# Patient Record
Sex: FEMALE | Race: BLACK OR AFRICAN AMERICAN | NOT HISPANIC OR LATINO | Employment: UNEMPLOYED | RURAL
[De-identification: names, ages, dates, MRNs, and addresses within clinical notes are randomized per-mention and may not be internally consistent; named-entity substitution may affect disease eponyms.]

---

## 2014-05-20 LAB — CRC RECOMMENDATION EXT: NORMAL

## 2020-09-30 LAB — CRC RECOMMENDATION EXT: NORMAL

## 2020-10-21 ENCOUNTER — HISTORICAL (OUTPATIENT)
Dept: ADMINISTRATIVE | Facility: HOSPITAL | Age: 65
End: 2020-10-21

## 2020-10-21 LAB
25(OH)D3 SERPL-MCNC: 41.2 NG/ML
ALBUMIN SERPL BCP-MCNC: 3.9 G/DL (ref 3.5–5.5)
ALBUMIN/GLOB SERPL: 1 {RATIO}
ALP SERPL-CCNC: 66 U/L (ref 42–141)
ALT SERPL W P-5'-P-CCNC: 22 U/L (ref 10–47)
ANION GAP SERPL CALCULATED.3IONS-SCNC: 10.7 MMOL/L (ref 7–16)
AST SERPL W P-5'-P-CCNC: 20 U/L (ref 11–38)
BACTERIA #/AREA URNS HPF: ABNORMAL /HPF
BILIRUB SERPL-MCNC: 1.2 MG/DL (ref 0.2–1.6)
BILIRUB UR QL STRIP: NEGATIVE MG/DL
BUN SERPL-MCNC: 13 MG/DL (ref 7–22)
BUN/CREAT SERPL: 18.6 G/DL
CALCIUM SERPL-MCNC: 10.2 MG/DL (ref 8–10.3)
CHLORIDE SERPL-SCNC: 106 MMOL/L (ref 98–108)
CHOLEST SERPL-MCNC: 150 MG/DL
CHOLEST/HDLC SERPL: 2.3 {RATIO}
CLARITY UR: CLEAR
CO2 SERPL-SCNC: 28 MMOL/L (ref 18–33)
COLOR UR: YELLOW
CREAT SERPL-MCNC: 0.7 MG/DL (ref 0.6–1.2)
ERYTHROCYTE [DISTWIDTH] IN BLOOD BY AUTOMATED COUNT: 13.6 % (ref 11.5–14.5)
EST. AVERAGE GLUCOSE BLD GHB EST-MCNC: 140 MG/DL
GLOBULIN SER-MCNC: 3 G/DL
GLUCOSE SERPL-MCNC: 138 MG/DL (ref 73–118)
GLUCOSE UR STRIP-MCNC: NORMAL MG/DL
HBA1C MFR BLD HPLC: 6.8 % (ref 4.5–6.6)
HCT VFR BLD AUTO: 38.1 % (ref 38–47)
HDLC SERPL-MCNC: 66 MG/DL
HGB BLD-MCNC: 12.6 G/DL (ref 12–16)
KETONES UR STRIP-SCNC: NEGATIVE MG/DL
LDLC SERPL CALC-MCNC: 71 MG/DL
LEUKOCYTE ESTERASE UR QL STRIP: NEGATIVE LEU/UL
LYMPHOCYTES # BLD AUTO: 1.3 X10E3/UL (ref 0.9–3)
LYMPHOCYTES NFR BLD AUTO: 41.6 % (ref 16.8–42.5)
MCH RBC QN AUTO: 29.5 PG (ref 27–31)
MCHC RBC AUTO-ENTMCNC: 33.1 G/DL (ref 32–36)
MCV RBC AUTO: 89.2 FL (ref 80–96)
MIXED, AUTO ABSOLUTE: 0.3 X10E3/UL (ref 0.3–1.1)
MIXED, AUTO RELATIVE: 9.2 % (ref 4.7–13.3)
MPC BLD CALC-MCNC: 9.3 FL (ref 9.4–12.4)
NEUTROPHILS # BLD AUTO: 1.5 X10E3/UL (ref 2.4–7.5)
NEUTROPHILS NFR BLD AUTO: 49.2 % (ref 46.4–74.7)
NITRITE UR QL STRIP: NEGATIVE
PH UR STRIP: 6.5 PH UNITS (ref 5–8)
PLATELET # BLD AUTO: 215 X10E3/UL (ref 150–400)
POTASSIUM SERPL-SCNC: 3.7 MMOL/L (ref 3.6–5.1)
PROT SERPL-MCNC: 7.1 G/DL (ref 6.4–8.1)
PROT UR QL STRIP: ABNORMAL MG/DL
RBC # BLD AUTO: 4.27 X10E6/UL (ref 4.2–5.4)
RBC # UR STRIP: ABNORMAL ERY/UL
RBC #/AREA URNS HPF: ABNORMAL /HPF (ref 0–3)
SODIUM SERPL-SCNC: 141 MMOL/L (ref 128–145)
SP GR UR STRIP: 1.02 (ref 1–1.03)
SQUAMOUS #/AREA URNS LPF: ABNORMAL /LPF
T4 SERPL-MCNC: 6.6 UG/DL (ref 4.8–13.9)
TRIGL SERPL-MCNC: 63 MG/DL
TSH SERPL DL<=0.005 MIU/L-ACNC: 0.77 UIU/ML (ref 0.36–3.74)
UROBILINOGEN UR STRIP-ACNC: 1 MG/DL
WBC # BLD AUTO: 3.1 X10E3/UL (ref 4.5–11)
WBC #/AREA URNS HPF: ABNORMAL /HPF (ref 0–5)

## 2020-11-16 ENCOUNTER — HISTORICAL (OUTPATIENT)
Dept: ADMINISTRATIVE | Facility: HOSPITAL | Age: 65
End: 2020-11-16

## 2020-11-16 LAB
BASOPHILS # BLD AUTO: 0.03 X10E3/UL (ref 0–0.2)
BASOPHILS NFR BLD AUTO: 0.7 % (ref 0–1)
EOSINOPHIL # BLD AUTO: 0.21 X10E3/UL (ref 0–0.5)
EOSINOPHIL NFR BLD AUTO: 4.7 % (ref 1–4)
ERYTHROCYTE [DISTWIDTH] IN BLOOD BY AUTOMATED COUNT: 14 % (ref 11.5–14.5)
HCT VFR BLD AUTO: 36.5 % (ref 38–47)
HGB BLD-MCNC: 12.2 G/DL (ref 12–16)
IMM GRANULOCYTES # BLD AUTO: 0.01 X10E3/UL (ref 0–0.04)
IMM GRANULOCYTES NFR BLD: 0.2 % (ref 0–0.4)
LYMPHOCYTES # BLD AUTO: 1.26 X10E3/UL (ref 1–4.8)
LYMPHOCYTES NFR BLD AUTO: 28.1 % (ref 27–41)
MCH RBC QN AUTO: 29.8 PG (ref 27–31)
MCHC RBC AUTO-ENTMCNC: 33.4 G/DL (ref 32–36)
MCV RBC AUTO: 89.2 FL (ref 80–96)
MONOCYTES # BLD AUTO: 0.49 X10E3/UL (ref 0–0.8)
MONOCYTES NFR BLD AUTO: 10.9 % (ref 2–6)
MPC BLD CALC-MCNC: 9.8 FL (ref 9.4–12.4)
NEUTROPHILS # BLD AUTO: 2.49 X10E3/UL (ref 1.8–7.7)
NEUTROPHILS NFR BLD AUTO: 55.4 % (ref 53–65)
NRBC # BLD AUTO: 0 X10E3/UL (ref 0–0)
NRBC, AUTO (.00): 0 /100 (ref 0–0)
PLATELET # BLD AUTO: 219 X10E3/UL (ref 150–400)
RBC # BLD AUTO: 4.09 X10E6/UL (ref 4.2–5.4)
WBC # BLD AUTO: 4.49 X10E3/UL (ref 4.5–11)

## 2020-11-17 ENCOUNTER — HISTORICAL (OUTPATIENT)
Dept: ADMINISTRATIVE | Facility: HOSPITAL | Age: 65
End: 2020-11-17

## 2020-11-17 LAB
ALBUMIN SERPL BCP-MCNC: 3.7 G/DL (ref 3.5–5)
ALBUMIN/GLOB SERPL: 0.9 {RATIO}
ALP SERPL-CCNC: 66 U/L (ref 50–130)
ALT SERPL W P-5'-P-CCNC: 25 U/L (ref 13–56)
ANION GAP SERPL CALCULATED.3IONS-SCNC: 10 MMOL/L
AST SERPL W P-5'-P-CCNC: 21 U/L (ref 15–37)
BILIRUB SERPL-MCNC: 0.5 MG/DL (ref 0–1.2)
BUN SERPL-MCNC: 12 MG/DL (ref 7–18)
BUN/CREAT SERPL: 15.6
CALCIUM SERPL-MCNC: 10 MG/DL (ref 8.5–10.1)
CHLORIDE SERPL-SCNC: 106 MMOL/L (ref 98–107)
CO2 SERPL-SCNC: 30 MMOL/L (ref 21–32)
CREAT SERPL-MCNC: 0.77 MG/DL (ref 0.55–1.02)
GLOBULIN SER-MCNC: 4.1 G/DL (ref 2–4)
GLUCOSE SERPL-MCNC: 88 MG/DL (ref 74–106)
POTASSIUM SERPL-SCNC: 3.4 MMOL/L (ref 3.5–5.1)
PROT SERPL-MCNC: 7.8 G/DL (ref 6.4–8.2)
SARS-COV-2 RNA AMPLIFICATION, QUAL: NEGATIVE
SODIUM SERPL-SCNC: 143 MMOL/L (ref 136–145)

## 2021-06-09 ENCOUNTER — OFFICE VISIT (OUTPATIENT)
Dept: OBSTETRICS AND GYNECOLOGY | Facility: CLINIC | Age: 66
End: 2021-06-09
Payer: COMMERCIAL

## 2021-06-09 VITALS
RESPIRATION RATE: 14 BRPM | TEMPERATURE: 98 F | BODY MASS INDEX: 35.79 KG/M2 | DIASTOLIC BLOOD PRESSURE: 69 MMHG | HEIGHT: 67 IN | HEART RATE: 60 BPM | SYSTOLIC BLOOD PRESSURE: 117 MMHG | WEIGHT: 228 LBS

## 2021-06-09 DIAGNOSIS — N95.0 POST-MENOPAUSAL BLEEDING: Primary | ICD-10-CM

## 2021-06-09 PROCEDURE — 99213 OFFICE O/P EST LOW 20 MIN: CPT | Mod: ,,, | Performed by: MIDWIFE

## 2021-06-09 PROCEDURE — 3008F BODY MASS INDEX DOCD: CPT | Mod: ,,, | Performed by: MIDWIFE

## 2021-06-09 PROCEDURE — 99213 PR OFFICE/OUTPT VISIT, EST, LEVL III, 20-29 MIN: ICD-10-PCS | Mod: ,,, | Performed by: MIDWIFE

## 2021-06-09 PROCEDURE — 3008F PR BODY MASS INDEX (BMI) DOCUMENTED: ICD-10-PCS | Mod: ,,, | Performed by: MIDWIFE

## 2021-06-09 RX ORDER — ATORVASTATIN CALCIUM 80 MG/1
80 TABLET, FILM COATED ORAL DAILY
COMMUNITY
Start: 2021-05-10 | End: 2023-04-20 | Stop reason: SDUPTHER

## 2021-06-09 RX ORDER — CARVEDILOL 6.25 MG/1
6.25 TABLET ORAL 2 TIMES DAILY
COMMUNITY
Start: 2021-05-25

## 2021-06-09 RX ORDER — MESALAMINE 1.2 G/1
2.4 TABLET, DELAYED RELEASE ORAL DAILY
COMMUNITY
Start: 2021-06-04 | End: 2021-07-21 | Stop reason: SDUPTHER

## 2021-06-09 RX ORDER — CHOLECALCIFEROL (VITAMIN D3) 25 MCG
1000 TABLET ORAL DAILY
COMMUNITY

## 2021-06-09 RX ORDER — EZETIMIBE 10 MG/1
10 TABLET ORAL DAILY
COMMUNITY
Start: 2021-05-25 | End: 2022-10-05

## 2021-06-09 RX ORDER — CLOPIDOGREL BISULFATE 75 MG/1
75 TABLET ORAL DAILY
COMMUNITY
Start: 2021-05-10

## 2021-06-09 RX ORDER — METFORMIN HYDROCHLORIDE 500 MG/1
500 TABLET ORAL 2 TIMES DAILY
COMMUNITY
Start: 2021-04-19 | End: 2021-07-21 | Stop reason: SDUPTHER

## 2021-06-09 RX ORDER — LISINOPRIL AND HYDROCHLOROTHIAZIDE 12.5; 2 MG/1; MG/1
1 TABLET ORAL 2 TIMES DAILY
COMMUNITY
Start: 2021-05-25 | End: 2021-07-21 | Stop reason: SDUPTHER

## 2021-06-09 RX ORDER — FOLIC ACID 1 MG/1
1000 TABLET ORAL DAILY
COMMUNITY
Start: 2021-04-19 | End: 2022-10-05

## 2021-06-09 RX ORDER — TRIAMTERENE/HYDROCHLOROTHIAZID 37.5-25 MG
1 TABLET ORAL DAILY
COMMUNITY
Start: 2021-05-04 | End: 2023-09-06 | Stop reason: SDUPTHER

## 2021-06-09 RX ORDER — GLIPIZIDE 10 MG/1
10 TABLET, FILM COATED, EXTENDED RELEASE ORAL
COMMUNITY
Start: 2021-04-19 | End: 2021-07-21 | Stop reason: SDUPTHER

## 2021-06-09 RX ORDER — ASPIRIN 81 MG/1
81 TABLET ORAL DAILY
COMMUNITY

## 2021-06-15 ENCOUNTER — HOSPITAL ENCOUNTER (OUTPATIENT)
Dept: RADIOLOGY | Facility: HOSPITAL | Age: 66
Discharge: HOME | End: 2021-06-15
Attending: MIDWIFE
Payer: COMMERCIAL

## 2021-06-15 DIAGNOSIS — N95.0 POST-MENOPAUSAL BLEEDING: ICD-10-CM

## 2021-06-15 PROCEDURE — 76856 US EXAM PELVIC COMPLETE: CPT | Mod: TC

## 2021-06-15 PROCEDURE — 76856 US PELVIS COMPLETE NON OB: ICD-10-PCS | Mod: 26,,, | Performed by: RADIOLOGY

## 2021-06-15 PROCEDURE — 76856 US EXAM PELVIC COMPLETE: CPT | Mod: 26,,, | Performed by: RADIOLOGY

## 2021-07-07 ENCOUNTER — TELEPHONE (OUTPATIENT)
Dept: OBSTETRICS AND GYNECOLOGY | Facility: CLINIC | Age: 66
End: 2021-07-07

## 2021-07-07 ENCOUNTER — PATIENT MESSAGE (OUTPATIENT)
Dept: OBSTETRICS AND GYNECOLOGY | Facility: CLINIC | Age: 66
End: 2021-07-07

## 2021-07-21 ENCOUNTER — OFFICE VISIT (OUTPATIENT)
Dept: FAMILY MEDICINE | Facility: CLINIC | Age: 66
End: 2021-07-21
Payer: COMMERCIAL

## 2021-07-21 VITALS — HEIGHT: 67 IN | BODY MASS INDEX: 35.94 KG/M2 | RESPIRATION RATE: 20 BRPM | WEIGHT: 229 LBS

## 2021-07-21 DIAGNOSIS — E78.5 HYPERLIPIDEMIA, UNSPECIFIED HYPERLIPIDEMIA TYPE: ICD-10-CM

## 2021-07-21 DIAGNOSIS — I10 ESSENTIAL HYPERTENSION: ICD-10-CM

## 2021-07-21 DIAGNOSIS — E11.9 TYPE 2 DIABETES MELLITUS WITHOUT COMPLICATION, WITHOUT LONG-TERM CURRENT USE OF INSULIN: Primary | ICD-10-CM

## 2021-07-21 LAB
ALBUMIN SERPL BCP-MCNC: 3.7 G/DL (ref 3.5–5)
ALBUMIN/GLOB SERPL: 0.9 {RATIO}
ALP SERPL-CCNC: 70 U/L (ref 50–130)
ALT SERPL W P-5'-P-CCNC: 30 U/L (ref 13–56)
ANION GAP SERPL CALCULATED.3IONS-SCNC: 8 MMOL/L (ref 7–16)
AST SERPL W P-5'-P-CCNC: 19 U/L (ref 15–37)
BASOPHILS # BLD AUTO: 0.03 K/UL (ref 0–0.2)
BASOPHILS NFR BLD AUTO: 1 % (ref 0–1)
BILIRUB SERPL-MCNC: 0.6 MG/DL (ref 0–1.2)
BUN SERPL-MCNC: 18 MG/DL (ref 7–18)
BUN/CREAT SERPL: 25 (ref 6–20)
CALCIUM SERPL-MCNC: 9.6 MG/DL (ref 8.5–10.1)
CHLORIDE SERPL-SCNC: 107 MMOL/L (ref 98–107)
CHOLEST SERPL-MCNC: 153 MG/DL (ref 0–200)
CHOLEST/HDLC SERPL: 2.3 {RATIO}
CO2 SERPL-SCNC: 29 MMOL/L (ref 21–32)
CREAT SERPL-MCNC: 0.72 MG/DL (ref 0.55–1.02)
DIFFERENTIAL METHOD BLD: ABNORMAL
EOSINOPHIL # BLD AUTO: 0.11 K/UL (ref 0–0.5)
EOSINOPHIL NFR BLD AUTO: 3.5 % (ref 1–4)
ERYTHROCYTE [DISTWIDTH] IN BLOOD BY AUTOMATED COUNT: 13.6 % (ref 11.5–14.5)
EST. AVERAGE GLUCOSE BLD GHB EST-MCNC: 124 MG/DL
GLOBULIN SER-MCNC: 4.1 G/DL (ref 2–4)
GLUCOSE SERPL-MCNC: 137 MG/DL (ref 74–106)
HBA1C MFR BLD HPLC: 6.3 % (ref 4.5–6.6)
HCT VFR BLD AUTO: 39.8 % (ref 38–47)
HDLC SERPL-MCNC: 67 MG/DL (ref 40–60)
HGB BLD-MCNC: 12.7 G/DL (ref 12–16)
IMM GRANULOCYTES # BLD AUTO: 0.01 K/UL (ref 0–0.04)
IMM GRANULOCYTES NFR BLD: 0.3 % (ref 0–0.4)
LDLC SERPL CALC-MCNC: 75 MG/DL
LDLC/HDLC SERPL: 1.1 {RATIO}
LYMPHOCYTES # BLD AUTO: 1.02 K/UL (ref 1–4.8)
LYMPHOCYTES NFR BLD AUTO: 32.6 % (ref 27–41)
MCH RBC QN AUTO: 29.1 PG (ref 27–31)
MCHC RBC AUTO-ENTMCNC: 31.9 G/DL (ref 32–36)
MCV RBC AUTO: 91.3 FL (ref 80–96)
MONOCYTES # BLD AUTO: 0.3 K/UL (ref 0–0.8)
MONOCYTES NFR BLD AUTO: 9.6 % (ref 2–6)
MPC BLD CALC-MCNC: 10.4 FL (ref 9.4–12.4)
NEUTROPHILS # BLD AUTO: 1.66 K/UL (ref 1.8–7.7)
NEUTROPHILS NFR BLD AUTO: 53 % (ref 53–65)
NONHDLC SERPL-MCNC: 86 MG/DL
NRBC # BLD AUTO: 0 X10E3/UL
NRBC, AUTO (.00): 0 %
PLATELET # BLD AUTO: 239 K/UL (ref 150–400)
POTASSIUM SERPL-SCNC: 4.2 MMOL/L (ref 3.5–5.1)
PROT SERPL-MCNC: 7.8 G/DL (ref 6.4–8.2)
RBC # BLD AUTO: 4.36 M/UL (ref 4.2–5.4)
SODIUM SERPL-SCNC: 140 MMOL/L (ref 136–145)
TRIGL SERPL-MCNC: 57 MG/DL (ref 35–150)
VLDLC SERPL-MCNC: 11 MG/DL
WBC # BLD AUTO: 3.13 K/UL (ref 4.5–11)

## 2021-07-21 PROCEDURE — 83036 HEMOGLOBIN GLYCOSYLATED A1C: CPT | Mod: ,,, | Performed by: CLINICAL MEDICAL LABORATORY

## 2021-07-21 PROCEDURE — 80053 COMPREHENSIVE METABOLIC PANEL: ICD-10-PCS | Mod: ,,, | Performed by: CLINICAL MEDICAL LABORATORY

## 2021-07-21 PROCEDURE — 85025 COMPLETE CBC W/AUTO DIFF WBC: CPT | Mod: ,,, | Performed by: CLINICAL MEDICAL LABORATORY

## 2021-07-21 PROCEDURE — 83036 HEMOGLOBIN A1C: ICD-10-PCS | Mod: ,,, | Performed by: CLINICAL MEDICAL LABORATORY

## 2021-07-21 PROCEDURE — 99213 OFFICE O/P EST LOW 20 MIN: CPT | Mod: ,,, | Performed by: NURSE PRACTITIONER

## 2021-07-21 PROCEDURE — 80053 COMPREHEN METABOLIC PANEL: CPT | Mod: ,,, | Performed by: CLINICAL MEDICAL LABORATORY

## 2021-07-21 PROCEDURE — 3008F PR BODY MASS INDEX (BMI) DOCUMENTED: ICD-10-PCS | Mod: ,,, | Performed by: NURSE PRACTITIONER

## 2021-07-21 PROCEDURE — 80061 LIPID PANEL: ICD-10-PCS | Mod: ,,, | Performed by: CLINICAL MEDICAL LABORATORY

## 2021-07-21 PROCEDURE — 99213 PR OFFICE/OUTPT VISIT, EST, LEVL III, 20-29 MIN: ICD-10-PCS | Mod: ,,, | Performed by: NURSE PRACTITIONER

## 2021-07-21 PROCEDURE — 3008F BODY MASS INDEX DOCD: CPT | Mod: ,,, | Performed by: NURSE PRACTITIONER

## 2021-07-21 PROCEDURE — 85025 CBC WITH DIFFERENTIAL: ICD-10-PCS | Mod: ,,, | Performed by: CLINICAL MEDICAL LABORATORY

## 2021-07-21 PROCEDURE — 80061 LIPID PANEL: CPT | Mod: ,,, | Performed by: CLINICAL MEDICAL LABORATORY

## 2021-07-21 RX ORDER — LISINOPRIL AND HYDROCHLOROTHIAZIDE 12.5; 2 MG/1; MG/1
1 TABLET ORAL 2 TIMES DAILY
Status: ON HOLD | COMMUNITY
Start: 2020-09-14 | End: 2021-12-31 | Stop reason: HOSPADM

## 2021-07-21 RX ORDER — CLOPIDOGREL BISULFATE 75 MG/1
75 TABLET ORAL DAILY
COMMUNITY
Start: 2020-09-14 | End: 2021-07-21 | Stop reason: SDUPTHER

## 2021-07-21 RX ORDER — GLIPIZIDE 10 MG/1
10 TABLET ORAL 2 TIMES DAILY
Qty: 180 TABLET | Refills: 1 | Status: SHIPPED | OUTPATIENT
Start: 2021-07-21 | End: 2022-01-05 | Stop reason: SDUPTHER

## 2021-07-21 RX ORDER — EZETIMIBE 10 MG/1
10 TABLET ORAL DAILY
COMMUNITY
Start: 2020-09-14 | End: 2021-07-21 | Stop reason: SDUPTHER

## 2021-07-21 RX ORDER — GLIPIZIDE 10 MG/1
10 TABLET ORAL 2 TIMES DAILY
COMMUNITY
Start: 2020-09-15 | End: 2021-07-21 | Stop reason: SDUPTHER

## 2021-07-21 RX ORDER — METFORMIN HYDROCHLORIDE 500 MG/1
500 TABLET ORAL 2 TIMES DAILY
COMMUNITY
Start: 2020-09-14 | End: 2021-07-21 | Stop reason: SDUPTHER

## 2021-07-21 RX ORDER — METFORMIN HYDROCHLORIDE 500 MG/1
500 TABLET ORAL 2 TIMES DAILY
Qty: 180 TABLET | Refills: 1 | Status: SHIPPED | OUTPATIENT
Start: 2021-07-21 | End: 2022-01-05 | Stop reason: SDUPTHER

## 2021-07-21 RX ORDER — ATORVASTATIN CALCIUM 80 MG/1
80 TABLET, FILM COATED ORAL DAILY
COMMUNITY
Start: 2020-09-14 | End: 2021-07-21 | Stop reason: SDUPTHER

## 2021-07-21 RX ORDER — CARVEDILOL 6.25 MG/1
6.25 TABLET ORAL 2 TIMES DAILY
COMMUNITY
Start: 2020-09-14 | End: 2021-07-21 | Stop reason: SDUPTHER

## 2021-07-21 RX ORDER — MESALAMINE 1.2 G/1
1.2 TABLET, DELAYED RELEASE ORAL 2 TIMES DAILY
COMMUNITY
Start: 2021-02-23 | End: 2022-10-05

## 2021-08-28 ENCOUNTER — HOSPITAL ENCOUNTER (EMERGENCY)
Facility: HOSPITAL | Age: 66
Discharge: HOME | End: 2021-08-28
Payer: OTHER MISCELLANEOUS

## 2021-08-28 VITALS
BODY MASS INDEX: 36.34 KG/M2 | DIASTOLIC BLOOD PRESSURE: 70 MMHG | HEART RATE: 80 BPM | WEIGHT: 231.5 LBS | RESPIRATION RATE: 16 BRPM | OXYGEN SATURATION: 97 % | TEMPERATURE: 99 F | SYSTOLIC BLOOD PRESSURE: 165 MMHG | HEIGHT: 67 IN
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
## 2021-08-28 DIAGNOSIS — T14.90XA INJURY: ICD-10-CM

## 2021-08-28 DIAGNOSIS — S76.012A HIP STRAIN, LEFT, INITIAL ENCOUNTER: ICD-10-CM

## 2021-08-28 PROCEDURE — 99283 EMERGENCY DEPT VISIT LOW MDM: CPT | Mod: ,,, | Performed by: NURSE PRACTITIONER

## 2021-08-28 PROCEDURE — 96372 THER/PROPH/DIAG INJ SC/IM: CPT

## 2021-08-28 PROCEDURE — 99284 EMERGENCY DEPT VISIT MOD MDM: CPT

## 2021-08-28 PROCEDURE — 99283 PR EMERGENCY DEPT VISIT,LEVEL III: ICD-10-PCS | Mod: ,,, | Performed by: NURSE PRACTITIONER

## 2021-08-28 PROCEDURE — 63600175 PHARM REV CODE 636 W HCPCS: Performed by: NURSE PRACTITIONER

## 2021-08-28 RX ORDER — KETOROLAC TROMETHAMINE 30 MG/ML
30 INJECTION, SOLUTION INTRAMUSCULAR; INTRAVENOUS
Status: COMPLETED | OUTPATIENT
Start: 2021-08-28 | End: 2021-08-28

## 2021-08-28 RX ORDER — NAPROXEN 500 MG/1
500 TABLET ORAL 2 TIMES DAILY PRN
Qty: 28 TABLET | Refills: 0 | Status: SHIPPED | OUTPATIENT
Start: 2021-08-28 | End: 2021-09-11

## 2021-08-28 RX ORDER — METHOCARBAMOL 500 MG/1
1000 TABLET, FILM COATED ORAL 3 TIMES DAILY
Qty: 30 TABLET | Refills: 0 | Status: SHIPPED | OUTPATIENT
Start: 2021-08-28 | End: 2021-09-02

## 2021-08-28 RX ORDER — ORPHENADRINE CITRATE 30 MG/ML
30 INJECTION INTRAMUSCULAR; INTRAVENOUS
Status: COMPLETED | OUTPATIENT
Start: 2021-08-28 | End: 2021-08-28

## 2021-08-28 RX ORDER — ACETAMINOPHEN 500 MG
500 TABLET ORAL EVERY 6 HOURS PRN
COMMUNITY

## 2021-08-28 RX ADMIN — KETOROLAC TROMETHAMINE 30 MG: 30 INJECTION, SOLUTION INTRAMUSCULAR; INTRAVENOUS at 06:08

## 2021-08-28 RX ADMIN — ORPHENADRINE CITRATE 30 MG: 30 INJECTION INTRAMUSCULAR; INTRAVENOUS at 06:08

## 2021-08-30 ENCOUNTER — HOSPITAL ENCOUNTER (OUTPATIENT)
Dept: RADIOLOGY | Facility: HOSPITAL | Age: 66
Discharge: HOME | End: 2021-08-30
Attending: NURSE PRACTITIONER
Payer: OTHER MISCELLANEOUS

## 2021-08-30 ENCOUNTER — OFFICE VISIT (OUTPATIENT)
Dept: PRIMARY CARE CLINIC | Facility: CLINIC | Age: 66
End: 2021-08-30
Payer: OTHER MISCELLANEOUS

## 2021-08-30 ENCOUNTER — LAB VISIT (OUTPATIENT)
Dept: PRIMARY CARE CLINIC | Facility: CLINIC | Age: 66
End: 2021-08-30
Payer: COMMERCIAL

## 2021-08-30 VITALS
RESPIRATION RATE: 18 BRPM | DIASTOLIC BLOOD PRESSURE: 81 MMHG | SYSTOLIC BLOOD PRESSURE: 121 MMHG | TEMPERATURE: 97 F | OXYGEN SATURATION: 98 % | HEART RATE: 62 BPM

## 2021-08-30 DIAGNOSIS — S70.02XA CONTUSION OF LEFT HIP, INITIAL ENCOUNTER: Primary | ICD-10-CM

## 2021-08-30 DIAGNOSIS — Z02.83 ENCOUNTER FOR EMPLOYMENT-RELATED DRUG TESTING: ICD-10-CM

## 2021-08-30 DIAGNOSIS — M25.559 PAIN IN UNSPECIFIED HIP: ICD-10-CM

## 2021-08-30 DIAGNOSIS — S70.02XA CONTUSION OF LEFT HIP, INITIAL ENCOUNTER: ICD-10-CM

## 2021-08-30 PROCEDURE — 73700 CT HIP WITHOUT CONTRAST LEFT: ICD-10-PCS | Mod: 26,LT,, | Performed by: RADIOLOGY

## 2021-08-30 PROCEDURE — 73700 CT LOWER EXTREMITY W/O DYE: CPT | Mod: 26,LT,, | Performed by: RADIOLOGY

## 2021-08-30 PROCEDURE — 99000 SPECIMEN HANDLING OFFICE-LAB: CPT | Mod: ,,, | Performed by: NURSE PRACTITIONER

## 2021-08-30 PROCEDURE — 73700 CT LOWER EXTREMITY W/O DYE: CPT | Mod: TC,LT

## 2021-08-30 PROCEDURE — 99000 PR SPECIMEN HANDLING,DR OFF->LAB: ICD-10-PCS | Mod: ,,, | Performed by: NURSE PRACTITIONER

## 2021-08-30 PROCEDURE — 96372 THER/PROPH/DIAG INJ SC/IM: CPT | Mod: ,,, | Performed by: NURSE PRACTITIONER

## 2021-08-30 PROCEDURE — 99213 PR OFFICE/OUTPT VISIT, EST, LEVL III, 20-29 MIN: ICD-10-PCS | Mod: 25,,, | Performed by: NURSE PRACTITIONER

## 2021-08-30 PROCEDURE — 99213 OFFICE O/P EST LOW 20 MIN: CPT | Mod: 25,,, | Performed by: NURSE PRACTITIONER

## 2021-08-30 PROCEDURE — 96372 PR INJECTION,THERAP/PROPH/DIAG2ST, IM OR SUBCUT: ICD-10-PCS | Mod: ,,, | Performed by: NURSE PRACTITIONER

## 2021-08-30 RX ORDER — KETOROLAC TROMETHAMINE 30 MG/ML
60 INJECTION, SOLUTION INTRAMUSCULAR; INTRAVENOUS
Status: COMPLETED | OUTPATIENT
Start: 2021-08-30 | End: 2021-08-30

## 2021-08-30 RX ADMIN — KETOROLAC TROMETHAMINE 60 MG: 30 INJECTION, SOLUTION INTRAMUSCULAR; INTRAVENOUS at 11:08

## 2021-09-07 ENCOUNTER — OFFICE VISIT (OUTPATIENT)
Dept: PRIMARY CARE CLINIC | Facility: CLINIC | Age: 66
End: 2021-09-07
Payer: OTHER MISCELLANEOUS

## 2021-09-07 ENCOUNTER — HOSPITAL ENCOUNTER (OUTPATIENT)
Dept: RADIOLOGY | Facility: HOSPITAL | Age: 66
Discharge: HOME | End: 2021-09-07
Attending: NURSE PRACTITIONER
Payer: COMMERCIAL

## 2021-09-07 VITALS
DIASTOLIC BLOOD PRESSURE: 62 MMHG | HEART RATE: 80 BPM | SYSTOLIC BLOOD PRESSURE: 139 MMHG | TEMPERATURE: 97 F | OXYGEN SATURATION: 98 % | RESPIRATION RATE: 20 BRPM

## 2021-09-07 DIAGNOSIS — S80.12XD CONTUSION OF LEFT KNEE AND LOWER LEG, SUBSEQUENT ENCOUNTER: ICD-10-CM

## 2021-09-07 DIAGNOSIS — S70.02XD CONTUSION OF LEFT HIP AND THIGH, SUBSEQUENT ENCOUNTER: Primary | ICD-10-CM

## 2021-09-07 DIAGNOSIS — S70.12XD CONTUSION OF LEFT HIP AND THIGH, SUBSEQUENT ENCOUNTER: ICD-10-CM

## 2021-09-07 DIAGNOSIS — S70.12XD CONTUSION OF LEFT HIP AND THIGH, SUBSEQUENT ENCOUNTER: Primary | ICD-10-CM

## 2021-09-07 DIAGNOSIS — S80.02XD CONTUSION OF LEFT KNEE AND LOWER LEG, SUBSEQUENT ENCOUNTER: ICD-10-CM
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
## 2021-09-07 DIAGNOSIS — S70.02XD CONTUSION OF LEFT HIP AND THIGH, SUBSEQUENT ENCOUNTER: ICD-10-CM

## 2021-09-07 PROBLEM — S70.12XA CONTUSION OF LEFT HIP AND THIGH: Status: ACTIVE | Noted: 2021-09-07

## 2021-09-07 PROBLEM — S70.02XA CONTUSION OF LEFT HIP AND THIGH: Status: ACTIVE | Noted: 2021-09-07

## 2021-09-07 PROCEDURE — 96372 THER/PROPH/DIAG INJ SC/IM: CPT | Mod: ,,, | Performed by: NURSE PRACTITIONER

## 2021-09-07 PROCEDURE — 73562 XR KNEE AP LAT WITH SUNRISE LEFT: ICD-10-PCS | Mod: 26,LT,, | Performed by: RADIOLOGY

## 2021-09-07 PROCEDURE — 99213 OFFICE O/P EST LOW 20 MIN: CPT | Mod: 25,,, | Performed by: NURSE PRACTITIONER

## 2021-09-07 PROCEDURE — 73562 X-RAY EXAM OF KNEE 3: CPT | Mod: TC,LT

## 2021-09-07 PROCEDURE — 99213 PR OFFICE/OUTPT VISIT, EST, LEVL III, 20-29 MIN: ICD-10-PCS | Mod: 25,,, | Performed by: NURSE PRACTITIONER

## 2021-09-07 PROCEDURE — 96372 PR INJECTION,THERAP/PROPH/DIAG2ST, IM OR SUBCUT: ICD-10-PCS | Mod: ,,, | Performed by: NURSE PRACTITIONER

## 2021-09-07 PROCEDURE — 73562 X-RAY EXAM OF KNEE 3: CPT | Mod: 26,LT,, | Performed by: RADIOLOGY

## 2021-09-07 RX ORDER — KETOROLAC TROMETHAMINE 30 MG/ML
30 INJECTION, SOLUTION INTRAMUSCULAR; INTRAVENOUS ONCE
Status: DISCONTINUED | OUTPATIENT
Start: 2021-09-07 | End: 2021-09-08

## 2021-09-07 RX ADMIN — KETOROLAC TROMETHAMINE 30 MG: 30 INJECTION, SOLUTION INTRAMUSCULAR; INTRAVENOUS at 03:09

## 2021-09-08 RX ORDER — KETOROLAC TROMETHAMINE 30 MG/ML
30 INJECTION, SOLUTION INTRAMUSCULAR; INTRAVENOUS
Status: COMPLETED | OUTPATIENT
Start: 2021-09-08 | End: 2021-09-07

## 2021-09-09 ENCOUNTER — OFFICE VISIT (OUTPATIENT)
Dept: PRIMARY CARE CLINIC | Facility: CLINIC | Age: 66
End: 2021-09-09
Payer: OTHER MISCELLANEOUS

## 2021-09-09 VITALS
TEMPERATURE: 98 F | HEART RATE: 53 BPM | RESPIRATION RATE: 20 BRPM | SYSTOLIC BLOOD PRESSURE: 142 MMHG | DIASTOLIC BLOOD PRESSURE: 96 MMHG

## 2021-09-09 DIAGNOSIS — S70.12XD CONTUSION OF LEFT HIP AND THIGH, SUBSEQUENT ENCOUNTER: Primary | ICD-10-CM

## 2021-09-09 DIAGNOSIS — S70.02XD CONTUSION OF LEFT HIP AND THIGH, SUBSEQUENT ENCOUNTER: Primary | ICD-10-CM

## 2021-09-09 PROCEDURE — 99213 PR OFFICE/OUTPT VISIT, EST, LEVL III, 20-29 MIN: ICD-10-PCS | Mod: ,,, | Performed by: NURSE PRACTITIONER

## 2021-09-09 PROCEDURE — 99213 OFFICE O/P EST LOW 20 MIN: CPT | Mod: ,,, | Performed by: NURSE PRACTITIONER

## 2021-09-09 RX ORDER — MELOXICAM 7.5 MG/1
7.5 TABLET ORAL DAILY
Qty: 10 TABLET | Refills: 0 | Status: SHIPPED | OUTPATIENT
Start: 2021-09-09 | End: 2021-09-16 | Stop reason: SDUPTHER

## 2021-09-16 ENCOUNTER — OFFICE VISIT (OUTPATIENT)
Dept: PRIMARY CARE CLINIC | Facility: CLINIC | Age: 66
End: 2021-09-16
Payer: OTHER MISCELLANEOUS

## 2021-09-16 VITALS
HEART RATE: 63 BPM | SYSTOLIC BLOOD PRESSURE: 92 MMHG | OXYGEN SATURATION: 98 % | RESPIRATION RATE: 18 BRPM | TEMPERATURE: 97 F | DIASTOLIC BLOOD PRESSURE: 61 MMHG

## 2021-09-16 DIAGNOSIS — S70.02XD CONTUSION OF LEFT HIP AND THIGH, SUBSEQUENT ENCOUNTER: Primary | ICD-10-CM

## 2021-09-16 DIAGNOSIS — S70.12XD CONTUSION OF LEFT HIP AND THIGH, SUBSEQUENT ENCOUNTER: Primary | ICD-10-CM

## 2021-09-16 DIAGNOSIS — S80.12XD CONTUSION OF LEFT KNEE AND LOWER LEG, SUBSEQUENT ENCOUNTER: ICD-10-CM

## 2021-09-16 DIAGNOSIS — S80.02XD CONTUSION OF LEFT KNEE AND LOWER LEG, SUBSEQUENT ENCOUNTER: ICD-10-CM
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
## 2021-09-16 PROCEDURE — 99213 PR OFFICE/OUTPT VISIT, EST, LEVL III, 20-29 MIN: ICD-10-PCS | Mod: ,,, | Performed by: NURSE PRACTITIONER

## 2021-09-16 PROCEDURE — 99213 OFFICE O/P EST LOW 20 MIN: CPT | Mod: ,,, | Performed by: NURSE PRACTITIONER

## 2021-09-16 RX ORDER — MELOXICAM 7.5 MG/1
7.5 TABLET ORAL DAILY
Qty: 10 TABLET | Refills: 0 | Status: ON HOLD | OUTPATIENT
Start: 2021-09-16 | End: 2021-12-31 | Stop reason: HOSPADM

## 2021-09-22 ENCOUNTER — OFFICE VISIT (OUTPATIENT)
Dept: PRIMARY CARE CLINIC | Facility: CLINIC | Age: 66
End: 2021-09-22
Payer: OTHER MISCELLANEOUS

## 2021-09-22 VITALS
HEIGHT: 67 IN | OXYGEN SATURATION: 99 % | SYSTOLIC BLOOD PRESSURE: 104 MMHG | RESPIRATION RATE: 20 BRPM | HEART RATE: 63 BPM | DIASTOLIC BLOOD PRESSURE: 62 MMHG | BODY MASS INDEX: 37.98 KG/M2 | WEIGHT: 242 LBS

## 2021-09-22 DIAGNOSIS — S70.02XD CONTUSION OF LEFT HIP, SUBSEQUENT ENCOUNTER: Primary | ICD-10-CM

## 2021-09-22 PROCEDURE — 99213 PR OFFICE/OUTPT VISIT, EST, LEVL III, 20-29 MIN: ICD-10-PCS | Mod: ,,, | Performed by: NURSE PRACTITIONER

## 2021-09-22 PROCEDURE — 99213 OFFICE O/P EST LOW 20 MIN: CPT | Mod: ,,, | Performed by: NURSE PRACTITIONER

## 2021-12-29 RX ORDER — OXYCODONE AND ACETAMINOPHEN 5; 325 MG/1; MG/1
1 TABLET ORAL EVERY 6 HOURS PRN
COMMUNITY
Start: 2021-12-27 | End: 2022-10-05 | Stop reason: ALTCHOICE

## 2021-12-29 RX ORDER — MISOPROSTOL 200 UG/1
TABLET ORAL
COMMUNITY
Start: 2021-12-27

## 2021-12-30 ENCOUNTER — HOSPITAL ENCOUNTER (OUTPATIENT)
Facility: HOSPITAL | Age: 66
Discharge: HOME | End: 2021-12-31
Attending: OBSTETRICS & GYNECOLOGY | Admitting: OBSTETRICS & GYNECOLOGY
Payer: COMMERCIAL

## 2021-12-30 ENCOUNTER — ANESTHESIA EVENT (OUTPATIENT)
Dept: SURGERY | Facility: HOSPITAL | Age: 66
End: 2021-12-30
Payer: COMMERCIAL

## 2021-12-30 ENCOUNTER — ANESTHESIA (OUTPATIENT)
Dept: SURGERY | Facility: HOSPITAL | Age: 66
End: 2021-12-30
Payer: COMMERCIAL

## 2021-12-30 DIAGNOSIS — R10.2 PELVIC PAIN IN FEMALE: ICD-10-CM

## 2021-12-30 DIAGNOSIS — N93.9 ABNORMAL UTERINE BLEEDING (AUB): ICD-10-CM

## 2021-12-30 DIAGNOSIS — N93.9 UTERINE BLEEDING: ICD-10-CM

## 2021-12-30 PROBLEM — N95.0 POSTMENOPAUSAL VAGINAL BLEEDING: Status: ACTIVE | Noted: 2021-12-30

## 2021-12-30 LAB
GLUCOSE SERPL-MCNC: 102 MG/DL (ref 70–105)
GLUCOSE SERPL-MCNC: 111 MG/DL (ref 70–105)
GLUCOSE SERPL-MCNC: 83 MG/DL (ref 70–105)
GLUCOSE SERPL-MCNC: 83 MG/DL (ref 70–105)

## 2021-12-30 PROCEDURE — 82962 GLUCOSE BLOOD TEST: CPT

## 2021-12-30 PROCEDURE — 25000003 PHARM REV CODE 250: Performed by: ANESTHESIOLOGY

## 2021-12-30 PROCEDURE — D9220A PRA ANESTHESIA: Mod: ANES,,, | Performed by: ANESTHESIOLOGY

## 2021-12-30 PROCEDURE — D9220A PRA ANESTHESIA: ICD-10-PCS | Mod: ANES,,, | Performed by: ANESTHESIOLOGY

## 2021-12-30 PROCEDURE — C1782 MORCELLATOR: HCPCS | Performed by: OBSTETRICS & GYNECOLOGY

## 2021-12-30 PROCEDURE — 27201423 OPTIME MED/SURG SUP & DEVICES STERILE SUPPLY: Performed by: OBSTETRICS & GYNECOLOGY

## 2021-12-30 PROCEDURE — D9220A PRA ANESTHESIA: ICD-10-PCS | Mod: CRNA,,, | Performed by: ANESTHESIOLOGY

## 2021-12-30 PROCEDURE — 71000033 HC RECOVERY, INTIAL HOUR: Performed by: OBSTETRICS & GYNECOLOGY

## 2021-12-30 PROCEDURE — 63600175 PHARM REV CODE 636 W HCPCS

## 2021-12-30 PROCEDURE — 71000039 HC RECOVERY, EACH ADD'L HOUR: Performed by: OBSTETRICS & GYNECOLOGY

## 2021-12-30 PROCEDURE — 88305 SURGICAL PATHOLOGY: ICD-10-PCS | Mod: 26,,, | Performed by: PATHOLOGY

## 2021-12-30 PROCEDURE — C1729 CATH, DRAINAGE: HCPCS | Performed by: OBSTETRICS & GYNECOLOGY

## 2021-12-30 PROCEDURE — 88305 TISSUE EXAM BY PATHOLOGIST: CPT | Mod: SUR | Performed by: OBSTETRICS & GYNECOLOGY

## 2021-12-30 PROCEDURE — D9220A PRA ANESTHESIA: Mod: CRNA,,, | Performed by: ANESTHESIOLOGY

## 2021-12-30 PROCEDURE — 36000706: Performed by: OBSTETRICS & GYNECOLOGY

## 2021-12-30 PROCEDURE — 37000008 HC ANESTHESIA 1ST 15 MINUTES: Performed by: OBSTETRICS & GYNECOLOGY

## 2021-12-30 PROCEDURE — 27000716 HC OXISENSOR PROBE, ANY SIZE: Performed by: ANESTHESIOLOGY

## 2021-12-30 PROCEDURE — 63600175 PHARM REV CODE 636 W HCPCS: Performed by: ANESTHESIOLOGY

## 2021-12-30 PROCEDURE — 37000009 HC ANESTHESIA EA ADD 15 MINS: Performed by: OBSTETRICS & GYNECOLOGY

## 2021-12-30 PROCEDURE — 88305 TISSUE EXAM BY PATHOLOGIST: CPT | Mod: 26,,, | Performed by: PATHOLOGY

## 2021-12-30 PROCEDURE — 36000707: Performed by: OBSTETRICS & GYNECOLOGY

## 2021-12-30 PROCEDURE — 25000003 PHARM REV CODE 250: Performed by: OBSTETRICS & GYNECOLOGY

## 2021-12-30 PROCEDURE — 27000177 HC AIRWAY, LARYNGEAL MASK: Performed by: ANESTHESIOLOGY

## 2021-12-30 RX ORDER — MORPHINE SULFATE 10 MG/ML
4 INJECTION INTRAMUSCULAR; INTRAVENOUS; SUBCUTANEOUS EVERY 5 MIN PRN
Status: DISCONTINUED | OUTPATIENT
Start: 2021-12-30 | End: 2021-12-30 | Stop reason: HOSPADM

## 2021-12-30 RX ORDER — ONDANSETRON 2 MG/ML
INJECTION INTRAMUSCULAR; INTRAVENOUS
Status: COMPLETED
Start: 2021-12-30 | End: 2021-12-30

## 2021-12-30 RX ORDER — DIPHENHYDRAMINE HYDROCHLORIDE 50 MG/ML
25 INJECTION INTRAMUSCULAR; INTRAVENOUS EVERY 6 HOURS PRN
Status: DISCONTINUED | OUTPATIENT
Start: 2021-12-30 | End: 2021-12-30 | Stop reason: HOSPADM

## 2021-12-30 RX ORDER — LIDOCAINE HYDROCHLORIDE 20 MG/ML
INJECTION, SOLUTION EPIDURAL; INFILTRATION; INTRACAUDAL; PERINEURAL
Status: DISCONTINUED | OUTPATIENT
Start: 2021-12-30 | End: 2021-12-30

## 2021-12-30 RX ORDER — PROCHLORPERAZINE EDISYLATE 5 MG/ML
5 INJECTION INTRAMUSCULAR; INTRAVENOUS EVERY 6 HOURS PRN
Status: DISCONTINUED | OUTPATIENT
Start: 2021-12-30 | End: 2021-12-31 | Stop reason: HOSPADM

## 2021-12-30 RX ORDER — MIDAZOLAM HYDROCHLORIDE 1 MG/ML
INJECTION INTRAMUSCULAR; INTRAVENOUS
Status: DISCONTINUED | OUTPATIENT
Start: 2021-12-30 | End: 2021-12-30

## 2021-12-30 RX ORDER — OXYCODONE AND ACETAMINOPHEN 7.5; 325 MG/1; MG/1
1 TABLET ORAL EVERY 4 HOURS PRN
Status: CANCELLED | OUTPATIENT
Start: 2021-12-31

## 2021-12-30 RX ORDER — SODIUM CHLORIDE 0.9 G/100ML
IRRIGANT IRRIGATION
Status: DISCONTINUED | OUTPATIENT
Start: 2021-12-30 | End: 2021-12-30 | Stop reason: HOSPADM

## 2021-12-30 RX ORDER — ONDANSETRON 4 MG/1
8 TABLET, ORALLY DISINTEGRATING ORAL EVERY 8 HOURS PRN
Status: DISCONTINUED | OUTPATIENT
Start: 2021-12-30 | End: 2021-12-31 | Stop reason: HOSPADM

## 2021-12-30 RX ORDER — SODIUM CHLORIDE 9 MG/ML
INJECTION, SOLUTION INTRAVENOUS CONTINUOUS
Status: DISCONTINUED | OUTPATIENT
Start: 2021-12-30 | End: 2021-12-31 | Stop reason: HOSPADM

## 2021-12-30 RX ORDER — DIPHENHYDRAMINE HYDROCHLORIDE 50 MG/ML
25 INJECTION INTRAMUSCULAR; INTRAVENOUS EVERY 4 HOURS PRN
Status: DISCONTINUED | OUTPATIENT
Start: 2021-12-30 | End: 2021-12-31 | Stop reason: HOSPADM

## 2021-12-30 RX ORDER — OXYCODONE HYDROCHLORIDE 5 MG/1
5 TABLET ORAL
Status: DISCONTINUED | OUTPATIENT
Start: 2021-12-30 | End: 2021-12-30 | Stop reason: HOSPADM

## 2021-12-30 RX ORDER — FENTANYL CITRATE 50 UG/ML
INJECTION, SOLUTION INTRAMUSCULAR; INTRAVENOUS
Status: DISCONTINUED | OUTPATIENT
Start: 2021-12-30 | End: 2021-12-30

## 2021-12-30 RX ORDER — ONDANSETRON 2 MG/ML
4 INJECTION INTRAMUSCULAR; INTRAVENOUS DAILY PRN
Status: DISCONTINUED | OUTPATIENT
Start: 2021-12-30 | End: 2021-12-30 | Stop reason: HOSPADM

## 2021-12-30 RX ORDER — HYDROCODONE BITARTRATE AND ACETAMINOPHEN 5; 325 MG/1; MG/1
1 TABLET ORAL EVERY 4 HOURS PRN
Status: DISCONTINUED | OUTPATIENT
Start: 2021-12-30 | End: 2021-12-31

## 2021-12-30 RX ORDER — HYDROMORPHONE HYDROCHLORIDE 2 MG/ML
0.5 INJECTION, SOLUTION INTRAMUSCULAR; INTRAVENOUS; SUBCUTANEOUS EVERY 5 MIN PRN
Status: DISCONTINUED | OUTPATIENT
Start: 2021-12-30 | End: 2021-12-30 | Stop reason: HOSPADM

## 2021-12-30 RX ORDER — PROMETHAZINE HYDROCHLORIDE 12.5 MG/1
25 SUPPOSITORY RECTAL EVERY 6 HOURS PRN
Status: DISCONTINUED | OUTPATIENT
Start: 2021-12-30 | End: 2021-12-30 | Stop reason: HOSPADM

## 2021-12-30 RX ORDER — ACETAMINOPHEN 500 MG
1000 TABLET ORAL
Status: DISCONTINUED | OUTPATIENT
Start: 2021-12-30 | End: 2021-12-30 | Stop reason: HOSPADM

## 2021-12-30 RX ORDER — MEPERIDINE HYDROCHLORIDE 25 MG/ML
25 INJECTION INTRAMUSCULAR; INTRAVENOUS; SUBCUTANEOUS EVERY 10 MIN PRN
Status: DISCONTINUED | OUTPATIENT
Start: 2021-12-30 | End: 2021-12-30 | Stop reason: HOSPADM

## 2021-12-30 RX ORDER — ONDANSETRON 2 MG/ML
4 INJECTION INTRAMUSCULAR; INTRAVENOUS ONCE
Status: COMPLETED | OUTPATIENT
Start: 2021-12-30 | End: 2021-12-30

## 2021-12-30 RX ORDER — DIPHENHYDRAMINE HCL 25 MG
25 CAPSULE ORAL EVERY 4 HOURS PRN
Status: DISCONTINUED | OUTPATIENT
Start: 2021-12-30 | End: 2021-12-31 | Stop reason: HOSPADM

## 2021-12-30 RX ORDER — CEFAZOLIN SODIUM 1 G/3ML
INJECTION, POWDER, FOR SOLUTION INTRAMUSCULAR; INTRAVENOUS
Status: DISCONTINUED | OUTPATIENT
Start: 2021-12-30 | End: 2021-12-30

## 2021-12-30 RX ORDER — ONDANSETRON 2 MG/ML
INJECTION INTRAMUSCULAR; INTRAVENOUS
Status: DISCONTINUED | OUTPATIENT
Start: 2021-12-30 | End: 2021-12-30

## 2021-12-30 RX ORDER — PROPOFOL 10 MG/ML
VIAL (ML) INTRAVENOUS
Status: DISCONTINUED | OUTPATIENT
Start: 2021-12-30 | End: 2021-12-30

## 2021-12-30 RX ADMIN — SODIUM CHLORIDE: 9 INJECTION, SOLUTION INTRAVENOUS at 02:12

## 2021-12-30 RX ADMIN — ONDANSETRON 4 MG: 2 INJECTION INTRAMUSCULAR; INTRAVENOUS at 04:12

## 2021-12-30 RX ADMIN — ONDANSETRON 4 MG: 2 INJECTION INTRAMUSCULAR; INTRAVENOUS at 09:12

## 2021-12-30 RX ADMIN — HYDROMORPHONE HYDROCHLORIDE 0.5 MG: 2 INJECTION INTRAMUSCULAR; INTRAVENOUS; SUBCUTANEOUS at 06:12

## 2021-12-30 RX ADMIN — PROPOFOL 50 MG: 10 INJECTION, EMULSION INTRAVENOUS at 05:12

## 2021-12-30 RX ADMIN — LIDOCAINE HYDROCHLORIDE 100 MG: 20 INJECTION, SOLUTION EPIDURAL; INFILTRATION; INTRACAUDAL; PERINEURAL at 04:12

## 2021-12-30 RX ADMIN — PROPOFOL 150 MG: 10 INJECTION, EMULSION INTRAVENOUS at 04:12

## 2021-12-30 RX ADMIN — FENTANYL CITRATE 50 MCG: 50 INJECTION INTRAMUSCULAR; INTRAVENOUS at 05:12

## 2021-12-30 RX ADMIN — CEFAZOLIN 2000 MG: 1 INJECTION, POWDER, FOR SOLUTION INTRAMUSCULAR; INTRAVENOUS; PARENTERAL at 04:12

## 2021-12-30 RX ADMIN — MIDAZOLAM HYDROCHLORIDE 2 MG: 1 INJECTION, SOLUTION INTRAMUSCULAR; INTRAVENOUS at 04:12

## 2021-12-30 NOTE — OP NOTE
[facility] - Peri Services  General Surgery  Operative Note    SUMMARY     Date of Procedure: 12/30/2021     Procedure: Procedure(s) (LRB):  HYSTEROSCOPY, WITH DILATION AND CURETTAGE OF UTERUS AND ENDOMETRIAL ABLATION (N/A)       Surgeon(s) and Role:     * Sarah Cueva, DO - Primary    Assisting Surgeon: None    Pre-Operative Diagnosis: Uterine bleeding [N93.9]  Pelvic pain in female [R10.2]    Post-Operative Diagnosis: Post-Op Diagnosis Codes:     * Uterine bleeding [N93.9]     * Pelvic pain in female [R10.2]    Anesthesia: General    Operative Findings (including complications, if any):numerous polyps in the cavity of the uterus ....     Description of Technical Procedures: Pt taken jeanie where she was prepped and draped in the usual sterile  fashion ..... the cervix was visualized and then cervix grasped with a single toothed tenaculum ... the hysteroscope was inserted and the cavity assessed with multiple polyps noted inside ... the myosure was inserted ad some of the polyps removed with direct visualization which became poor as the polyps bled after removal  followed by an ablation ... the ablation was performed with myosure cautery with assessing the uterien length 9 cm and then having the  array pass the initial device test check  followed by insertion and then the cautery of the uterine  cavity x 120 seconds.. the device was removed with the hysteroscope reinserted with cauteized fragments noted... these were removed with the myosure with continued bleedign .... the device was remvoved and bleeding trickels so a 30 cc cleary balloon was placed in the uterus to hold pressure for a few hours... anesthesia was asked about TXA .... will research if she has contraindications ... will leave the cleary in for now and uncork the outflow in a few hours to see if we still get fresh return ... if we do, leave overnight and remove in am     Significant Surgical Tasks Conducted by the Assistant(s), if  Applicable: none     Estimated Blood Loss (EBL): 100           Implants:     Specimens:   Specimen (24h ago, onward)             Start     Ordered    12/30/21 1721  Surgical Pathology  RELEASE UPON ORDERING         12/30/21 1721                        Condition: Good    Disposition: PACU - hemodynamically stable.    Attestation: I was present and scrubbed for the entire procedure.

## 2021-12-30 NOTE — H&P
[facility] - Periop Services  History & Physical    Subjective:      Chief Complaint/Reason for Admission:hysterosocpy and D and C for AUB   with AUB postmenopausally with thickend end stripe concernign for polyp or end hyperpasia - complex ... stenotic os ... cyctotec written and for ablation and removal of polyp   Karie Kaufman is a 66 y.o. female    Past Medical History:   Diagnosis Date    Diabetes mellitus     Hypertension      Past Surgical History:   Procedure Laterality Date    CORONARY ARTERY BYPASS GRAFT (CABG)  2017    Quadruple Bypass     Family History   Problem Relation Age of Onset    Hypertension Paternal Grandfather     Hypertension Paternal Grandmother     Diabetes Maternal Grandmother     Hypertension Father     Cancer Father      Social History     Tobacco Use    Smoking status: Never Smoker    Smokeless tobacco: Never Used   Substance Use Topics    Alcohol use: Never    Drug use: Never       No medications prior to admission.     Review of patient's allergies indicates:  No Known Allergies     Review of Systems   Constitutional: Negative.    HENT: Negative.    Eyes: Negative.    Respiratory: Negative.    Cardiovascular: Negative.    Gastrointestinal: Negative.    Genitourinary: Negative.    Musculoskeletal: Negative.    Skin: Negative.    Neurological: Negative.    Endo/Heme/Allergies: Negative.    Psychiatric/Behavioral: Negative.    All other systems reviewed and are negative.      Objective:      Vital Signs (Most Recent)       Vital Signs Range (Last 24H):       Physical Exam  HENT:      Head: Normocephalic.      Nose: Nose normal.      Mouth/Throat:      Mouth: Mucous membranes are moist.   Eyes:      Pupils: Pupils are equal, round, and reactive to light.   Cardiovascular:      Rate and Rhythm: Normal rate and regular rhythm.      Pulses: Normal pulses.   Pulmonary:      Effort: Pulmonary effort is normal.      Breath sounds: Normal breath sounds.   Abdominal:       General: Abdomen is flat. Bowel sounds are normal.      Palpations: Abdomen is soft.   Musculoskeletal:         General: Normal range of motion.      Cervical back: Normal range of motion.   Skin:     General: Skin is warm.      Capillary Refill: Capillary refill takes less than 2 seconds.   Neurological:      General: No focal deficit present.      Mental Status: She is alert.   Psychiatric:         Mood and Affect: Mood normal.         Behavior: Behavior normal.         Thought Content: Thought content normal.         Judgment: Judgment normal.         Data Review:    ECG:     Assessment:      There are no hospital problems to display for this patient.      Plan:     for D and C/ Hysterosopcy / sunny ablation

## 2021-12-31 VITALS
HEART RATE: 55 BPM | DIASTOLIC BLOOD PRESSURE: 65 MMHG | WEIGHT: 220 LBS | RESPIRATION RATE: 18 BRPM | BODY MASS INDEX: 34.53 KG/M2 | OXYGEN SATURATION: 99 % | SYSTOLIC BLOOD PRESSURE: 122 MMHG | TEMPERATURE: 98 F | HEIGHT: 67 IN

## 2021-12-31 LAB
BASOPHILS # BLD AUTO: 0.02 K/UL (ref 0–0.2)
BASOPHILS # BLD AUTO: 0.02 K/UL (ref 0–0.2)
BASOPHILS NFR BLD AUTO: 0.4 % (ref 0–1)
BASOPHILS NFR BLD AUTO: 0.4 % (ref 0–1)
DIFFERENTIAL METHOD BLD: ABNORMAL
DIFFERENTIAL METHOD BLD: ABNORMAL
EOSINOPHIL # BLD AUTO: 0.04 K/UL (ref 0–0.5)
EOSINOPHIL # BLD AUTO: 0.05 K/UL (ref 0–0.5)
EOSINOPHIL NFR BLD AUTO: 0.8 % (ref 1–4)
EOSINOPHIL NFR BLD AUTO: 1.1 % (ref 1–4)
ERYTHROCYTE [DISTWIDTH] IN BLOOD BY AUTOMATED COUNT: 13.7 % (ref 11.5–14.5)
ERYTHROCYTE [DISTWIDTH] IN BLOOD BY AUTOMATED COUNT: 13.8 % (ref 11.5–14.5)
GLUCOSE SERPL-MCNC: 102 MG/DL (ref 70–105)
HCT VFR BLD AUTO: 32.7 % (ref 38–47)
HCT VFR BLD AUTO: 36.6 % (ref 38–47)
HGB BLD-MCNC: 11 G/DL (ref 12–16)
HGB BLD-MCNC: 11.9 G/DL (ref 12–16)
IMM GRANULOCYTES # BLD AUTO: 0.01 K/UL (ref 0–0.04)
IMM GRANULOCYTES # BLD AUTO: 0.01 K/UL (ref 0–0.04)
IMM GRANULOCYTES NFR BLD: 0.2 % (ref 0–0.4)
IMM GRANULOCYTES NFR BLD: 0.2 % (ref 0–0.4)
LYMPHOCYTES # BLD AUTO: 0.97 K/UL (ref 1–4.8)
LYMPHOCYTES # BLD AUTO: 1.02 K/UL (ref 1–4.8)
LYMPHOCYTES NFR BLD AUTO: 20.7 % (ref 27–41)
LYMPHOCYTES NFR BLD AUTO: 21.2 % (ref 27–41)
MCH RBC QN AUTO: 28.9 PG (ref 27–31)
MCH RBC QN AUTO: 29.7 PG (ref 27–31)
MCHC RBC AUTO-ENTMCNC: 32.5 G/DL (ref 32–36)
MCHC RBC AUTO-ENTMCNC: 33.6 G/DL (ref 32–36)
MCV RBC AUTO: 86.1 FL (ref 80–96)
MCV RBC AUTO: 91.3 FL (ref 80–96)
MONOCYTES # BLD AUTO: 0.36 K/UL (ref 0–0.8)
MONOCYTES # BLD AUTO: 0.39 K/UL (ref 0–0.8)
MONOCYTES NFR BLD AUTO: 7.3 % (ref 2–6)
MONOCYTES NFR BLD AUTO: 8.5 % (ref 2–6)
MPC BLD CALC-MCNC: 10 FL (ref 9.4–12.4)
MPC BLD CALC-MCNC: 10 FL (ref 9.4–12.4)
NEUTROPHILS # BLD AUTO: 3.14 K/UL (ref 1.8–7.7)
NEUTROPHILS # BLD AUTO: 3.47 K/UL (ref 1.8–7.7)
NEUTROPHILS NFR BLD AUTO: 68.6 % (ref 53–65)
NEUTROPHILS NFR BLD AUTO: 70.6 % (ref 53–65)
NRBC # BLD AUTO: 0 X10E3/UL
NRBC # BLD AUTO: 0 X10E3/UL
NRBC, AUTO (.00): 0 %
NRBC, AUTO (.00): 0 %
PLATELET # BLD AUTO: 186 K/UL (ref 150–400)
PLATELET # BLD AUTO: 193 K/UL (ref 150–400)
RBC # BLD AUTO: 3.8 M/UL (ref 4.2–5.4)
RBC # BLD AUTO: 4.01 M/UL (ref 4.2–5.4)
WBC # BLD AUTO: 4.58 K/UL (ref 4.5–11)
WBC # BLD AUTO: 4.92 K/UL (ref 4.5–11)

## 2021-12-31 PROCEDURE — 36415 COLL VENOUS BLD VENIPUNCTURE: CPT | Performed by: OBSTETRICS & GYNECOLOGY

## 2021-12-31 PROCEDURE — 94761 N-INVAS EAR/PLS OXIMETRY MLT: CPT

## 2021-12-31 PROCEDURE — 99900035 HC TECH TIME PER 15 MIN (STAT)

## 2021-12-31 PROCEDURE — 82962 GLUCOSE BLOOD TEST: CPT

## 2021-12-31 PROCEDURE — 85025 COMPLETE CBC W/AUTO DIFF WBC: CPT | Mod: 91 | Performed by: OBSTETRICS & GYNECOLOGY

## 2021-12-31 PROCEDURE — 25000003 PHARM REV CODE 250: Performed by: OBSTETRICS & GYNECOLOGY

## 2021-12-31 RX ORDER — OXYCODONE AND ACETAMINOPHEN 5; 325 MG/1; MG/1
1 TABLET ORAL EVERY 4 HOURS PRN
Status: DISCONTINUED | OUTPATIENT
Start: 2021-12-31 | End: 2021-12-31 | Stop reason: HOSPADM

## 2021-12-31 RX ORDER — MEDROXYPROGESTERONE ACETATE 10 MG/1
10 TABLET ORAL DAILY
Status: DISCONTINUED | OUTPATIENT
Start: 2021-12-31 | End: 2021-12-31 | Stop reason: HOSPADM

## 2021-12-31 RX ADMIN — SODIUM CHLORIDE 500 ML: 9 INJECTION, SOLUTION INTRAVENOUS at 10:12

## 2021-12-31 NOTE — DISCHARGE SUMMARY
[facility] - Orthopedic  Obstetrics  Discharge Summary      Patient Name: Karie Kaufman  MRN: [id number]  Admission Date: 12/30/2021  Hospital Length of Stay: 0 days  Discharge Date and Time: No discharge date for patient encounter.  Attending Physician: Sarah Cueva DO   Discharging Provider: Sarah Cueva DO  Primary Care Provider: Gabriel Mckenzie MD    HPI: PT admitted with polyps suspected for cause of thickened end stripe ... she had been seen initially for passing large blood clots and was ehr for a D and C / Hysterosopcy and uterine  ablation ... she did indeed have very vascular polyps and these were removed with the myosure and then the sunny ablation was used .... she had some bleeding  at the end of  The case and ws noted to have a bradycaridqa during surgery which persisted ... she also had a low blood pressure ... she had this morning an improvement in ehr systolic and heart rate remained in the 50s range but her diastolic still remained in the 45 range ..... the balloon was removed with old blood noted and no blood on coughing... will ambualte patient  and see how she feels , get a repeat CBC , and then a fluid bolus of 500 cc after .     Procedure(s) (LRB):  HYSTEROSCOPY, WITH DILATION AND CURETTAGE OF UTERUS AND ENDOMETRIAL ABLATION (N/A)     Hospital Course:  As abiove ... thickened end stripe ,,,,,status post removal of polyps as well as ablation .... bleedign after ... intrauterine  balloon placed and then observed ... pt did not bleed through packing ladt nihg and cleary balloon was taken out this morning and pt had no new bleedign ... will ambulate and then see how she feels .... of note her diastolic was low ... will IV hydrate and se hos eh feels and follow up with me on mopnday at 830 .. if bleedign persists , will need hysterectomy ... awaiting path to rule out cancer         Final Active Diagnoses:    Diagnosis Date Noted POA    PRINCIPAL PROBLEM:  Postmenopausal  vaginal bleeding [N95.0] 12/30/2021 Unknown      Problems Resolved During this Admission:        Labs:   CBC   Recent Labs   Lab 12/31/21  0721   WBC 4.92   HGB 11.0*   HCT 32.7*          Feeding Method:    Immunizations     None          This patient has no babies on file.  Pending Diagnostic Studies:     Procedure Component Value Units Date/Time    CBC Auto Differential [[id number]]     Order Status: Sent Lab Status: No result     Specimen: Blood     Narrative:      The following orders were created for panel order CBC Auto Differential.  Procedure                               Abnormality         Status                     ---------                               -----------         ------                     CBC with Differential[[id number]]                                                         Please view results for these tests on the individual orders.    CBC with Differential [[id number]]     Order Status: Sent Lab Status: No result     Specimen: Blood     EXTRA TUBES [[id number]]     Order Status: Sent Lab Status: No result     Specimen: Blood, Venous     Narrative:      The following orders were created for panel order EXTRA TUBES.  Procedure                               Abnormality         Status                     ---------                               -----------         ------                     Light Green Top Hold[[id number]]                                                          Please view results for these tests on the individual orders.    Light Green Top Hold [[id number]]     Order Status: Sent Lab Status: No result     Specimen: Blood, Venous     Surgical Pathology [[id number]] Collected: 12/30/21 1721    Order Status: Sent Lab Status: No result     Specimen: Tissue from Endometrium           Discharged Condition: fair    Disposition: Home or Self Care    Follow Up:    Patient Instructions:   No discharge procedures on file.  Medications:  Current Discharge Medication List      CONTINUE  these medications which have NOT CHANGED    Details   aspirin (ECOTRIN) 81 MG EC tablet Take 81 mg by mouth once daily.      atorvastatin (LIPITOR) 80 MG tablet Take 80 mg by mouth once daily.      carvediloL (COREG) 6.25 MG tablet Take 6.25 mg by mouth 2 (two) times daily.      ezetimibe (ZETIA) 10 mg tablet Take 10 mg by mouth once daily.      lisinopriL-hydrochlorothiazide (PRINZIDE,ZESTORETIC) 20-12.5 mg per tablet Take 1 tablet by mouth 2 (two) times a day.      metFORMIN (GLUCOPHAGE) 500 MG tablet Take 1 tablet (500 mg total) by mouth 2 (two) times a day.  Qty: 180 tablet, Refills: 1    Associated Diagnoses: Type 2 diabetes mellitus without complication, without long-term current use of insulin      triamterene-hydrochlorothiazide 37.5-25 mg (MAXZIDE-25) 37.5-25 mg per tablet Take 1 tablet by mouth once daily.      vitamin D (VITAMIN D3) 1000 units Tab Take 1,000 Units by mouth once daily.      acetaminophen (TYLENOL) 500 MG tablet Take 500 mg by mouth every 6 (six) hours as needed for Pain.      clopidogreL (PLAVIX) 75 mg tablet Take 75 mg by mouth once daily.      folic acid (FOLVITE) 1 MG tablet Take 1,000 mcg by mouth once daily. for 90 days      folic acid/multivit-min/lutein (CENTRUM SILVER ORAL) Take by mouth.      glipiZIDE (GLUCOTROL) 10 MG tablet Take 1 tablet (10 mg total) by mouth 2 (two) times a day.  Qty: 180 tablet, Refills: 1    Associated Diagnoses: Type 2 diabetes mellitus without complication, without long-term current use of insulin      meloxicam (MOBIC) 7.5 MG tablet Take 1 tablet (7.5 mg total) by mouth once daily.  Qty: 10 tablet, Refills: 0    Associated Diagnoses: Contusion of left hip and thigh, subsequent encounter; Contusion of left knee and lower leg, subsequent encounter; Fall from slipping on slippery surface, subsequent encounter      mesalamine (LIALDA) 1.2 gram TbEC Take 1.2 g by mouth 2 (two) times a day.      miSOPROStoL (CYTOTEC) 200 MCG Tab INSERT ONE TABLET VAGINALLY AT  BEDTIME WEDNESDAY NIGHT      multivit-min/folic acid/biotin (HAIR,SKIN AND NAILS,FA-BIOTIN, ORAL) Take by mouth.      oxyCODONE-acetaminophen (PERCOCET) 5-325 mg per tablet Take 1 tablet by mouth every 6 (six) hours as needed.         will go home with provera 10 mg a day ....     Sarah Cueva, DO  Obstetrics  [facility] - Orthopedic

## 2021-12-31 NOTE — DISCHARGE SUMMARY
Pt had some bleedign after the procedure and she was reevlauated in the OR while on he table... she was noted to have some brisk trickkling nad had been evaluate with a small area bleeding from the tenaculum site ... the area was compressed with a ring forcep and then the area was noted ot be bleeding form the uterus presumable form a polyp that had been removed where she bled form the base ... a 20 [place] 30 cc ballon catheter was inserted to compress and she was sen tto recovery where she had some oozing after ... the vagina was packed to apply pressure to the tenaculm site and she stopped bleeding ... of note , she had a lower blood pressure after she had received 2 doses of dilaudid and she had bradycardia .. this resolved after hydration .. will observe overnight and recheck ha nd ha and remove the balloon in the am ...     Sister notified and given updates

## 2021-12-31 NOTE — NURSING
Received patient from PACU. Report received. NADN. Patient denies needs/complaints at this time. VSS. Will continue to monitor.

## 2021-12-31 NOTE — PROGRESS NOTES
Bleeding noted from vagina. B/p 84/40.  at bedside. Vaginal packing placed. Catheter intact in vagina. Safety measures ongoing. Will continue to monitor.

## 2021-12-31 NOTE — PLAN OF CARE
1850  Pt with  Cleary cath in uterus and inflated and vag packing intact also with cleary cath draining urine

## 2021-12-31 NOTE — PLAN OF CARE
1850 received report from arlene wan pt awakens easily pt sao2 95% on ra pt pt sb per scope pt with vag packing intact pt cleary draining well dr bruno in to see pt states to hold pt until she sees pt after her next case    2000 pt resting well pt sao2 95% on ra pt sb per scope no vag bleeding noted will monitor     2035 recheck 111  2100 dr bruno at bedside no bleeding noted states its ok to take to room will remove cleary cath from uterus and vag packing in am     2115 pt released to amada rutledge rn b/p 159/55 pulse 45 resp 16 sao2 97% on ra temp 97.5 oral

## 2021-12-31 NOTE — PROGRESS NOTES
Report given to zhang rose. care released. Nad noted. No complaints/needs voiced. Vss. Poss 2. Packing in place

## 2021-12-31 NOTE — PROGRESS NOTES
Pt better today .. packing removed and old blood noted ... will get her up to ambulate and make shure she is not weak or dizzy .... she reprots feeling well ... will recheck an H and H now and also after the H and H give a fluid bolus for her low diastolic of 45 ... pt had litle urine during the surgery on the cath done and may be a little dry ... will hydrate and follow up n Monday at 8:30 am ... will place on provera 10 mg a day .. will probabl;y need hysterectomy of bleeding is persistant ... awaiting path to make sure it is not cancer

## 2021-12-31 NOTE — PLAN OF CARE
Problem: Adult Inpatient Plan of Care  Goal: Plan of Care Review  Outcome: Ongoing, Progressing     Problem: Adult Inpatient Plan of Care  Goal: Plan of Care Review  Outcome: Ongoing, Progressing     Problem: Adult Inpatient Plan of Care  Goal: Optimal Comfort and Wellbeing  Outcome: Ongoing, Progressing     Problem: Adult Inpatient Plan of Care  Goal: Readiness for Transition of Care  Outcome: Ongoing, Progressing

## 2021-12-31 NOTE — OR NURSING
1710 PATIENTS SISTER JERRY INFORMED OF SURGERY START PER PATIENTS REQUEST  1736 DR COLLIER TALKED TO PATIENTS SISTER VIA PHONE

## 2022-01-04 LAB
DHEA SERPL-MCNC: NORMAL
ESTROGEN SERPL-MCNC: NORMAL PG/ML
LAB AP GROSS DESCRIPTION: NORMAL
LAB AP LABORATORY NOTES: NORMAL
T3RU NFR SERPL: NORMAL %

## 2022-01-05 ENCOUNTER — OFFICE VISIT (OUTPATIENT)
Dept: FAMILY MEDICINE | Facility: CLINIC | Age: 67
End: 2022-01-05
Payer: COMMERCIAL

## 2022-01-05 VITALS
RESPIRATION RATE: 18 BRPM | SYSTOLIC BLOOD PRESSURE: 144 MMHG | DIASTOLIC BLOOD PRESSURE: 59 MMHG | OXYGEN SATURATION: 95 % | HEIGHT: 67 IN | BODY MASS INDEX: 35.26 KG/M2 | WEIGHT: 224.63 LBS | HEART RATE: 68 BPM

## 2022-01-05 DIAGNOSIS — E11.9 TYPE 2 DIABETES MELLITUS WITHOUT COMPLICATION, WITHOUT LONG-TERM CURRENT USE OF INSULIN: ICD-10-CM

## 2022-01-05 LAB
ANION GAP SERPL CALCULATED.3IONS-SCNC: 5 MMOL/L (ref 7–16)
BASOPHILS # BLD AUTO: 0.03 K/UL (ref 0–0.2)
BASOPHILS NFR BLD AUTO: 0.7 % (ref 0–1)
BUN SERPL-MCNC: 18 MG/DL (ref 7–18)
BUN/CREAT SERPL: 23 (ref 6–20)
CALCIUM SERPL-MCNC: 10.6 MG/DL (ref 8.5–10.1)
CHLORIDE SERPL-SCNC: 106 MMOL/L (ref 98–107)
CO2 SERPL-SCNC: 33 MMOL/L (ref 21–32)
CREAT SERPL-MCNC: 0.79 MG/DL (ref 0.55–1.02)
CREAT UR-MCNC: 130 MG/DL (ref 28–219)
DIFFERENTIAL METHOD BLD: ABNORMAL
EOSINOPHIL # BLD AUTO: 0.08 K/UL (ref 0–0.5)
EOSINOPHIL NFR BLD AUTO: 1.9 % (ref 1–4)
ERYTHROCYTE [DISTWIDTH] IN BLOOD BY AUTOMATED COUNT: 14.2 % (ref 11.5–14.5)
GLUCOSE SERPL-MCNC: 110 MG/DL (ref 74–106)
HCT VFR BLD AUTO: 37.6 % (ref 38–47)
HGB BLD-MCNC: 11.9 G/DL (ref 12–16)
IMM GRANULOCYTES # BLD AUTO: 0.01 K/UL (ref 0–0.04)
IMM GRANULOCYTES NFR BLD: 0.2 % (ref 0–0.4)
LYMPHOCYTES # BLD AUTO: 1.42 K/UL (ref 1–4.8)
LYMPHOCYTES NFR BLD AUTO: 34.1 % (ref 27–41)
MCH RBC QN AUTO: 29.2 PG (ref 27–31)
MCHC RBC AUTO-ENTMCNC: 31.6 G/DL (ref 32–36)
MCV RBC AUTO: 92.2 FL (ref 80–96)
MICROALBUMIN UR-MCNC: 0.8 MG/DL (ref 0–2.8)
MICROALBUMIN/CREAT RATIO PNL UR: 6.2 MG/G (ref 0–30)
MONOCYTES # BLD AUTO: 0.34 K/UL (ref 0–0.8)
MONOCYTES NFR BLD AUTO: 8.2 % (ref 2–6)
MPC BLD CALC-MCNC: 10 FL (ref 9.4–12.4)
NEUTROPHILS # BLD AUTO: 2.29 K/UL (ref 1.8–7.7)
NEUTROPHILS NFR BLD AUTO: 54.9 % (ref 53–65)
NRBC # BLD AUTO: 0 X10E3/UL
NRBC, AUTO (.00): 0 %
PLATELET # BLD AUTO: 253 K/UL (ref 150–400)
POTASSIUM SERPL-SCNC: 3.6 MMOL/L (ref 3.5–5.1)
PROT UR-MCNC: 11.1 MG/DL (ref 0–11.9)
RBC # BLD AUTO: 4.08 M/UL (ref 4.2–5.4)
SODIUM SERPL-SCNC: 140 MMOL/L (ref 136–145)
WBC # BLD AUTO: 4.17 K/UL (ref 4.5–11)

## 2022-01-05 PROCEDURE — 3077F SYST BP >= 140 MM HG: CPT | Mod: CPTII,,, | Performed by: INTERNAL MEDICINE

## 2022-01-05 PROCEDURE — 83036 HEMOGLOBIN A1C: ICD-10-PCS | Mod: ,,, | Performed by: CLINICAL MEDICAL LABORATORY

## 2022-01-05 PROCEDURE — 3288F FALL RISK ASSESSMENT DOCD: CPT | Mod: CPTII,,, | Performed by: INTERNAL MEDICINE

## 2022-01-05 PROCEDURE — 99204 PR OFFICE/OUTPT VISIT, NEW, LEVL IV, 45-59 MIN: ICD-10-PCS | Mod: 25,,, | Performed by: INTERNAL MEDICINE

## 2022-01-05 PROCEDURE — 3061F NEG MICROALBUMINURIA REV: CPT | Mod: CPTII,,, | Performed by: INTERNAL MEDICINE

## 2022-01-05 PROCEDURE — 3061F PR NEG MICROALBUMINURIA RESULT DOCUMENTED/REVIEW: ICD-10-PCS | Mod: CPTII,,, | Performed by: INTERNAL MEDICINE

## 2022-01-05 PROCEDURE — 80048 BASIC METABOLIC PANEL: ICD-10-PCS | Mod: ,,, | Performed by: CLINICAL MEDICAL LABORATORY

## 2022-01-05 PROCEDURE — 1160F RVW MEDS BY RX/DR IN RCRD: CPT | Mod: CPTII,,, | Performed by: INTERNAL MEDICINE

## 2022-01-05 PROCEDURE — 1101F PR PT FALLS ASSESS DOC 0-1 FALLS W/OUT INJ PAST YR: ICD-10-PCS | Mod: CPTII,,, | Performed by: INTERNAL MEDICINE

## 2022-01-05 PROCEDURE — 80048 BASIC METABOLIC PNL TOTAL CA: CPT | Mod: ,,, | Performed by: CLINICAL MEDICAL LABORATORY

## 2022-01-05 PROCEDURE — 82043 MICROALBUMIN / CREATININE RATIO URINE: ICD-10-PCS | Mod: ,,, | Performed by: CLINICAL MEDICAL LABORATORY

## 2022-01-05 PROCEDURE — 82043 UR ALBUMIN QUANTITATIVE: CPT | Mod: ,,, | Performed by: CLINICAL MEDICAL LABORATORY

## 2022-01-05 PROCEDURE — 3066F NEPHROPATHY DOC TX: CPT | Mod: CPTII,,, | Performed by: INTERNAL MEDICINE

## 2022-01-05 PROCEDURE — 99204 OFFICE O/P NEW MOD 45 MIN: CPT | Mod: 25,,, | Performed by: INTERNAL MEDICINE

## 2022-01-05 PROCEDURE — 83036 HEMOGLOBIN GLYCOSYLATED A1C: CPT | Mod: ,,, | Performed by: CLINICAL MEDICAL LABORATORY

## 2022-01-05 PROCEDURE — 3008F PR BODY MASS INDEX (BMI) DOCUMENTED: ICD-10-PCS | Mod: CPTII,,, | Performed by: INTERNAL MEDICINE

## 2022-01-05 PROCEDURE — 3077F PR MOST RECENT SYSTOLIC BLOOD PRESSURE >= 140 MM HG: ICD-10-PCS | Mod: CPTII,,, | Performed by: INTERNAL MEDICINE

## 2022-01-05 PROCEDURE — 1160F PR REVIEW ALL MEDS BY PRESCRIBER/CLIN PHARMACIST DOCUMENTED: ICD-10-PCS | Mod: CPTII,,, | Performed by: INTERNAL MEDICINE

## 2022-01-05 PROCEDURE — 84156 ASSAY OF PROTEIN URINE: CPT | Mod: ,,, | Performed by: CLINICAL MEDICAL LABORATORY

## 2022-01-05 PROCEDURE — 85025 COMPLETE CBC W/AUTO DIFF WBC: CPT | Mod: ,,, | Performed by: CLINICAL MEDICAL LABORATORY

## 2022-01-05 PROCEDURE — 3078F PR MOST RECENT DIASTOLIC BLOOD PRESSURE < 80 MM HG: ICD-10-PCS | Mod: CPTII,,, | Performed by: INTERNAL MEDICINE

## 2022-01-05 PROCEDURE — 85025 CBC WITH DIFFERENTIAL: ICD-10-PCS | Mod: ,,, | Performed by: CLINICAL MEDICAL LABORATORY

## 2022-01-05 PROCEDURE — 84156 PROTEIN, QUANTITATIVE, URINE RANDOM: ICD-10-PCS | Mod: ,,, | Performed by: CLINICAL MEDICAL LABORATORY

## 2022-01-05 PROCEDURE — 3066F PR DOCUMENTATION OF TREATMENT FOR NEPHROPATHY: ICD-10-PCS | Mod: CPTII,,, | Performed by: INTERNAL MEDICINE

## 2022-01-05 PROCEDURE — 90471 PNEUMOCOCCAL POLYSACCHARIDE VACCINE 23-VALENT =>2YO SQ IM: ICD-10-PCS | Mod: ,,, | Performed by: INTERNAL MEDICINE

## 2022-01-05 PROCEDURE — 3008F BODY MASS INDEX DOCD: CPT | Mod: CPTII,,, | Performed by: INTERNAL MEDICINE

## 2022-01-05 PROCEDURE — 1101F PT FALLS ASSESS-DOCD LE1/YR: CPT | Mod: CPTII,,, | Performed by: INTERNAL MEDICINE

## 2022-01-05 PROCEDURE — 82570 ASSAY OF URINE CREATININE: CPT | Mod: ,,, | Performed by: CLINICAL MEDICAL LABORATORY

## 2022-01-05 PROCEDURE — 90732 PPSV23 VACC 2 YRS+ SUBQ/IM: CPT | Mod: ,,, | Performed by: INTERNAL MEDICINE

## 2022-01-05 PROCEDURE — 90732 PNEUMOCOCCAL POLYSACCHARIDE VACCINE 23-VALENT =>2YO SQ IM: ICD-10-PCS | Mod: ,,, | Performed by: INTERNAL MEDICINE

## 2022-01-05 PROCEDURE — 3288F PR FALLS RISK ASSESSMENT DOCUMENTED: ICD-10-PCS | Mod: CPTII,,, | Performed by: INTERNAL MEDICINE

## 2022-01-05 PROCEDURE — 1159F MED LIST DOCD IN RCRD: CPT | Mod: CPTII,,, | Performed by: INTERNAL MEDICINE

## 2022-01-05 PROCEDURE — 1159F PR MEDICATION LIST DOCUMENTED IN MEDICAL RECORD: ICD-10-PCS | Mod: CPTII,,, | Performed by: INTERNAL MEDICINE

## 2022-01-05 PROCEDURE — 82570 MICROALBUMIN / CREATININE RATIO URINE: ICD-10-PCS | Mod: ,,, | Performed by: CLINICAL MEDICAL LABORATORY

## 2022-01-05 PROCEDURE — 3078F DIAST BP <80 MM HG: CPT | Mod: CPTII,,, | Performed by: INTERNAL MEDICINE

## 2022-01-05 PROCEDURE — 3044F HG A1C LEVEL LT 7.0%: CPT | Mod: CPTII,,, | Performed by: INTERNAL MEDICINE

## 2022-01-05 PROCEDURE — 3044F PR MOST RECENT HEMOGLOBIN A1C LEVEL <7.0%: ICD-10-PCS | Mod: CPTII,,, | Performed by: INTERNAL MEDICINE

## 2022-01-05 PROCEDURE — 90471 IMMUNIZATION ADMIN: CPT | Mod: ,,, | Performed by: INTERNAL MEDICINE

## 2022-01-05 RX ORDER — METFORMIN HYDROCHLORIDE 500 MG/1
500 TABLET ORAL 2 TIMES DAILY
Qty: 180 TABLET | Refills: 1 | Status: SHIPPED | OUTPATIENT
Start: 2022-01-05 | End: 2022-07-05 | Stop reason: SDUPTHER

## 2022-01-05 RX ORDER — GLIPIZIDE 10 MG/1
10 TABLET ORAL 2 TIMES DAILY
Qty: 180 TABLET | Refills: 1 | Status: SHIPPED | OUTPATIENT
Start: 2022-01-05 | End: 2022-07-05 | Stop reason: SDUPTHER

## 2022-01-06 LAB
EST. AVERAGE GLUCOSE BLD GHB EST-MCNC: 114 MG/DL
HBA1C MFR BLD HPLC: 6 % (ref 4.5–6.6)

## 2022-01-06 NOTE — PROGRESS NOTES
Subjective:       Patient ID: Karie Kaufman is a 66 y.o. female.    Chief Complaint: Follow-up    HPI patient seen 01/05/2022 patient is awake alert patient requests pneumonia vaccine today.  Patient otherwise does not have any other major complaints    Vitals reviewed vitals are stable lungs are clear no crackles or wheezing cardiovascular S1-S2 regular rate rhythm abdomen is soft positive bowel sounds nontender extremities nonedematous neuro nonfocal    Patient has diabetes vitamin-D deficiency history of coronary artery disease the plan is to check her urine for protein and albumin BMP CBC hemoglobin A1c and provide the patient with a pneumonia vaccine today.  Review of Systems      Objective:      Physical Exam    Assessment:       Problem List Items Addressed This Visit    None     Visit Diagnoses     Type 2 diabetes mellitus without complication, without long-term current use of insulin        Relevant Medications    glipiZIDE (GLUCOTROL) 10 MG tablet    metFORMIN (GLUCOPHAGE) 500 MG tablet    Other Relevant Orders    Basic Metabolic Panel (Completed)    CBC Auto Differential (Completed)    Hemoglobin A1C (Completed)    Microalbumin/Creatinine Ratio, Urine (Completed)    Protein, Random Urine (Completed)          Plan:

## 2022-01-10 ENCOUNTER — TELEPHONE (OUTPATIENT)
Dept: FAMILY MEDICINE | Facility: CLINIC | Age: 67
End: 2022-01-10
Payer: COMMERCIAL

## 2022-01-10 NOTE — TELEPHONE ENCOUNTER
----- Message from Eufemia Borden sent at 1/6/2022  3:33 PM CST -----  Patient would like the results of her lab work on yesterday [phone]       3-24--no answer-left message

## 2022-04-05 ENCOUNTER — OFFICE VISIT (OUTPATIENT)
Dept: FAMILY MEDICINE | Facility: CLINIC | Age: 67
End: 2022-04-05
Payer: COMMERCIAL

## 2022-04-05 VITALS
HEART RATE: 49 BPM | DIASTOLIC BLOOD PRESSURE: 51 MMHG | WEIGHT: 225.38 LBS | OXYGEN SATURATION: 98 % | HEIGHT: 67 IN | BODY MASS INDEX: 35.37 KG/M2 | SYSTOLIC BLOOD PRESSURE: 128 MMHG | RESPIRATION RATE: 16 BRPM | TEMPERATURE: 98 F

## 2022-04-05 DIAGNOSIS — E11.9 TYPE 2 DIABETES MELLITUS WITHOUT COMPLICATION, WITHOUT LONG-TERM CURRENT USE OF INSULIN: Primary | ICD-10-CM

## 2022-04-05 DIAGNOSIS — E78.5 HYPERLIPIDEMIA, UNSPECIFIED HYPERLIPIDEMIA TYPE: ICD-10-CM

## 2022-04-05 DIAGNOSIS — I10 ESSENTIAL HYPERTENSION: ICD-10-CM

## 2022-04-05 DIAGNOSIS — I25.118 CORONARY ARTERY DISEASE OF NATIVE ARTERY OF NATIVE HEART WITH STABLE ANGINA PECTORIS: ICD-10-CM

## 2022-04-05 DIAGNOSIS — R00.1 BRADYCARDIA: ICD-10-CM

## 2022-04-05 LAB
ANION GAP SERPL CALCULATED.3IONS-SCNC: 7 MMOL/L (ref 7–16)
BUN SERPL-MCNC: 19 MG/DL (ref 7–18)
BUN/CREAT SERPL: 22 (ref 6–20)
CALCIUM SERPL-MCNC: 10.2 MG/DL (ref 8.5–10.1)
CHLORIDE SERPL-SCNC: 102 MMOL/L (ref 98–107)
CHOLEST SERPL-MCNC: 161 MG/DL (ref 0–200)
CHOLEST/HDLC SERPL: 2.3 {RATIO}
CO2 SERPL-SCNC: 31 MMOL/L (ref 21–32)
CREAT SERPL-MCNC: 0.87 MG/DL (ref 0.55–1.02)
EKG 12-LEAD: NORMAL
EST. AVERAGE GLUCOSE BLD GHB EST-MCNC: 127 MG/DL
GLUCOSE SERPL-MCNC: 154 MG/DL (ref 74–106)
HBA1C MFR BLD HPLC: 6.4 % (ref 4.5–6.6)
HDLC SERPL-MCNC: 71 MG/DL (ref 40–60)
LDLC SERPL CALC-MCNC: 79 MG/DL
LDLC/HDLC SERPL: 1.1 {RATIO}
NONHDLC SERPL-MCNC: 90 MG/DL
POTASSIUM SERPL-SCNC: 3.9 MMOL/L (ref 3.5–5.1)
PR INTERVAL: NORMAL
PRT AXES: NORMAL
QRS DURATION: NORMAL
QT/QTC: NORMAL
SODIUM SERPL-SCNC: 136 MMOL/L (ref 136–145)
TRIGL SERPL-MCNC: 55 MG/DL (ref 35–150)
VENTRICULAR RATE: NORMAL
VLDLC SERPL-MCNC: 11 MG/DL

## 2022-04-05 PROCEDURE — 3061F PR NEG MICROALBUMINURIA RESULT DOCUMENTED/REVIEW: ICD-10-PCS | Mod: ,,, | Performed by: INTERNAL MEDICINE

## 2022-04-05 PROCEDURE — 93005 POCT EKG 12-LEAD: ICD-10-PCS | Mod: ,,, | Performed by: INTERNAL MEDICINE

## 2022-04-05 PROCEDURE — 4010F PR ACE/ARB THEARPY RXD/TAKEN: ICD-10-PCS | Mod: ,,, | Performed by: INTERNAL MEDICINE

## 2022-04-05 PROCEDURE — 3288F PR FALLS RISK ASSESSMENT DOCUMENTED: ICD-10-PCS | Mod: ,,, | Performed by: INTERNAL MEDICINE

## 2022-04-05 PROCEDURE — 3078F PR MOST RECENT DIASTOLIC BLOOD PRESSURE < 80 MM HG: ICD-10-PCS | Mod: ,,, | Performed by: INTERNAL MEDICINE

## 2022-04-05 PROCEDURE — 99214 OFFICE O/P EST MOD 30 MIN: CPT | Mod: ,,, | Performed by: INTERNAL MEDICINE

## 2022-04-05 PROCEDURE — 3074F SYST BP LT 130 MM HG: CPT | Mod: ,,, | Performed by: INTERNAL MEDICINE

## 2022-04-05 PROCEDURE — 83036 HEMOGLOBIN GLYCOSYLATED A1C: CPT | Mod: ,,, | Performed by: CLINICAL MEDICAL LABORATORY

## 2022-04-05 PROCEDURE — 99214 PR OFFICE/OUTPT VISIT, EST, LEVL IV, 30-39 MIN: ICD-10-PCS | Mod: ,,, | Performed by: INTERNAL MEDICINE

## 2022-04-05 PROCEDURE — 3044F HG A1C LEVEL LT 7.0%: CPT | Mod: ,,, | Performed by: INTERNAL MEDICINE

## 2022-04-05 PROCEDURE — 3008F PR BODY MASS INDEX (BMI) DOCUMENTED: ICD-10-PCS | Mod: ,,, | Performed by: INTERNAL MEDICINE

## 2022-04-05 PROCEDURE — 80061 LIPID PANEL: CPT | Mod: ,,, | Performed by: CLINICAL MEDICAL LABORATORY

## 2022-04-05 PROCEDURE — 3078F DIAST BP <80 MM HG: CPT | Mod: ,,, | Performed by: INTERNAL MEDICINE

## 2022-04-05 PROCEDURE — 4010F ACE/ARB THERAPY RXD/TAKEN: CPT | Mod: ,,, | Performed by: INTERNAL MEDICINE

## 2022-04-05 PROCEDURE — 80048 BASIC METABOLIC PNL TOTAL CA: CPT | Mod: ,,, | Performed by: CLINICAL MEDICAL LABORATORY

## 2022-04-05 PROCEDURE — 1101F PR PT FALLS ASSESS DOC 0-1 FALLS W/OUT INJ PAST YR: ICD-10-PCS | Mod: ,,, | Performed by: INTERNAL MEDICINE

## 2022-04-05 PROCEDURE — 3288F FALL RISK ASSESSMENT DOCD: CPT | Mod: ,,, | Performed by: INTERNAL MEDICINE

## 2022-04-05 PROCEDURE — 93005 ELECTROCARDIOGRAM TRACING: CPT | Mod: ,,, | Performed by: INTERNAL MEDICINE

## 2022-04-05 PROCEDURE — 3008F BODY MASS INDEX DOCD: CPT | Mod: ,,, | Performed by: INTERNAL MEDICINE

## 2022-04-05 PROCEDURE — 3066F PR DOCUMENTATION OF TREATMENT FOR NEPHROPATHY: ICD-10-PCS | Mod: ,,, | Performed by: INTERNAL MEDICINE

## 2022-04-05 PROCEDURE — 1160F RVW MEDS BY RX/DR IN RCRD: CPT | Mod: ,,, | Performed by: INTERNAL MEDICINE

## 2022-04-05 PROCEDURE — 3066F NEPHROPATHY DOC TX: CPT | Mod: ,,, | Performed by: INTERNAL MEDICINE

## 2022-04-05 PROCEDURE — 3074F PR MOST RECENT SYSTOLIC BLOOD PRESSURE < 130 MM HG: ICD-10-PCS | Mod: ,,, | Performed by: INTERNAL MEDICINE

## 2022-04-05 PROCEDURE — 3044F PR MOST RECENT HEMOGLOBIN A1C LEVEL <7.0%: ICD-10-PCS | Mod: ,,, | Performed by: INTERNAL MEDICINE

## 2022-04-05 PROCEDURE — 1101F PT FALLS ASSESS-DOCD LE1/YR: CPT | Mod: ,,, | Performed by: INTERNAL MEDICINE

## 2022-04-05 PROCEDURE — 3061F NEG MICROALBUMINURIA REV: CPT | Mod: ,,, | Performed by: INTERNAL MEDICINE

## 2022-04-05 PROCEDURE — 83036 HEMOGLOBIN A1C: ICD-10-PCS | Mod: ,,, | Performed by: CLINICAL MEDICAL LABORATORY

## 2022-04-05 PROCEDURE — 80061 LIPID PANEL: ICD-10-PCS | Mod: ,,, | Performed by: CLINICAL MEDICAL LABORATORY

## 2022-04-05 PROCEDURE — 1159F MED LIST DOCD IN RCRD: CPT | Mod: ,,, | Performed by: INTERNAL MEDICINE

## 2022-04-05 PROCEDURE — 1160F PR REVIEW ALL MEDS BY PRESCRIBER/CLIN PHARMACIST DOCUMENTED: ICD-10-PCS | Mod: ,,, | Performed by: INTERNAL MEDICINE

## 2022-04-05 PROCEDURE — 1159F PR MEDICATION LIST DOCUMENTED IN MEDICAL RECORD: ICD-10-PCS | Mod: ,,, | Performed by: INTERNAL MEDICINE

## 2022-04-05 PROCEDURE — 80048 BASIC METABOLIC PANEL: ICD-10-PCS | Mod: ,,, | Performed by: CLINICAL MEDICAL LABORATORY

## 2022-04-05 RX ORDER — LISINOPRIL AND HYDROCHLOROTHIAZIDE 12.5; 2 MG/1; MG/1
1 TABLET ORAL 2 TIMES DAILY
COMMUNITY
Start: 2022-03-15 | End: 2023-09-06 | Stop reason: SDUPTHER

## 2022-04-05 RX ORDER — PROGESTERONE 200 MG/1
200 CAPSULE ORAL NIGHTLY
COMMUNITY
Start: 2022-03-14 | End: 2022-10-05 | Stop reason: ALTCHOICE

## 2022-04-05 RX ORDER — MEDROXYPROGESTERONE ACETATE 10 MG/1
10 TABLET ORAL DAILY
COMMUNITY
Start: 2021-12-31 | End: 2022-10-05

## 2022-04-05 NOTE — PATIENT INSTRUCTIONS
Karie was seen today for follow-up, results and diabetes.    Diagnoses and all orders for this visit:    Type 2 diabetes mellitus without complication, without long-term current use of insulin  -     Basic Metabolic Panel; Future  -     Hemoglobin A1C; Future  -     Lipid Panel; Future  -     Basic Metabolic Panel  -     Hemoglobin A1C  -     Lipid Panel    Essential hypertension  -     Basic Metabolic Panel; Future  -     Hemoglobin A1C; Future  -     Lipid Panel; Future  -     Basic Metabolic Panel  -     Hemoglobin A1C  -     Lipid Panel    Hyperlipidemia, unspecified hyperlipidemia type    Bradycardia  -     POCT EKG 12-LEAD (NOT FOR [facility] USE)    Coronary artery disease of native artery of native heart with stable angina pectoris

## 2022-04-05 NOTE — PROGRESS NOTES
Subjective:       Patient ID: Karie Kaufman is a 66 y.o. female.    Chief Complaint: Follow-up, Results, and Diabetes    Patient is here today for check up evaluation. Patient recent labs reviewed and shows A1C is improving and currently at 6.0. Patient is requesting repeat labs today. Will order accordingly. Patient reports no new complaints and is doing well. Health maintenance discussed and patient is up to date. Heart rate is low on intake today. Will order EKG to evaluate. Will follow in 3 months.      Current Medications:    Current Outpatient Medications:     acetaminophen (TYLENOL) 500 MG tablet, Take 500 mg by mouth every 6 (six) hours as needed for Pain., Disp: , Rfl:     aspirin (ECOTRIN) 81 MG EC tablet, Take 81 mg by mouth once daily., Disp: , Rfl:     atorvastatin (LIPITOR) 80 MG tablet, Take 80 mg by mouth once daily., Disp: , Rfl:     carvediloL (COREG) 6.25 MG tablet, Take 6.25 mg by mouth 2 (two) times daily., Disp: , Rfl:     clopidogreL (PLAVIX) 75 mg tablet, Take 75 mg by mouth once daily., Disp: , Rfl:     ezetimibe (ZETIA) 10 mg tablet, Take 10 mg by mouth once daily., Disp: , Rfl:     folic acid (FOLVITE) 1 MG tablet, Take 1,000 mcg by mouth once daily. for 90 days, Disp: , Rfl:     folic acid/multivit-min/lutein (CENTRUM SILVER ORAL), Take by mouth., Disp: , Rfl:     glipiZIDE (GLUCOTROL) 10 MG tablet, Take 1 tablet (10 mg total) by mouth 2 (two) times a day., Disp: 180 tablet, Rfl: 1    lisinopriL-hydrochlorothiazide (PRINZIDE,ZESTORETIC) 20-12.5 mg per tablet, Take 1 tablet by mouth 2 (two) times daily., Disp: , Rfl:     medroxyPROGESTERone (PROVERA) 10 MG tablet, Take 10 mg by mouth once daily., Disp: , Rfl:     mesalamine (LIALDA) 1.2 gram TbEC, Take 1.2 g by mouth 2 (two) times a day., Disp: , Rfl:     metFORMIN (GLUCOPHAGE) 500 MG tablet, Take 1 tablet (500 mg total) by mouth 2 (two) times a day., Disp: 180 tablet, Rfl: 1    miSOPROStoL (CYTOTEC) 200 MCG Tab, INSERT ONE  TABLET VAGINALLY AT BEDTIME WEDNESDAY NIGHT, Disp: , Rfl:     multivit-min/folic acid/biotin (HAIR,SKIN AND NAILS,FA-BIOTIN, ORAL), Take by mouth., Disp: , Rfl:     oxyCODONE-acetaminophen (PERCOCET) 5-325 mg per tablet, Take 1 tablet by mouth every 6 (six) hours as needed., Disp: , Rfl:     progesterone (PROMETRIUM) 200 MG capsule, Take 200 mg by mouth nightly., Disp: , Rfl:     triamterene-hydrochlorothiazide 37.5-25 mg (MAXZIDE-25) 37.5-25 mg per tablet, Take 1 tablet by mouth once daily., Disp: , Rfl:     vitamin D (VITAMIN D3) 1000 units Tab, Take 1,000 Units by mouth once daily., Disp: , Rfl:     Last Labs:     No visits with results within 1 Month(s) from this visit.   Latest known visit with results is:   Office Visit on 01/05/2022   Component Date Value    Sodium 01/05/2022 140     Potassium 01/05/2022 3.6     Chloride 01/05/2022 106     CO2 01/05/2022 33 (A)    Anion Gap 01/05/2022 5 (A)    Glucose 01/05/2022 110 (A)    BUN 01/05/2022 18     Creatinine 01/05/2022 0.79     BUN/Creatinine Ratio 01/05/2022 23 (A)    Calcium 01/05/2022 10.6 (A)    eGFR 01/05/2022 78     Hemoglobin A1C 01/05/2022 6.0     Estimated Average Glucose 01/05/2022 114     WBC 01/05/2022 4.17 (A)    RBC 01/05/2022 4.08 (A)    Hemoglobin 01/05/2022 11.9 (A)    Hematocrit 01/05/2022 37.6 (A)    MCV 01/05/2022 92.2     MCH 01/05/2022 29.2     MCHC 01/05/2022 31.6 (A)    RDW 01/05/2022 14.2     Platelet Count 01/05/2022 253     MPV 01/05/2022 10.0     Neutrophils % 01/05/2022 54.9     Lymphocytes % 01/05/2022 34.1     Monocytes % 01/05/2022 8.2 (A)    Eosinophils % 01/05/2022 1.9     Basophils % 01/05/2022 0.7     Immature Granulocytes % 01/05/2022 0.2     nRBC, Auto 01/05/2022 0.0     Neutrophils, Abs 01/05/2022 2.29     Lymphocytes, Absolute 01/05/2022 1.42     Monocytes, Absolute 01/05/2022 0.34     Eosinophils, Absolute 01/05/2022 0.08     Basophils, Absolute 01/05/2022 0.03     Immature  Granulocytes, A* 01/05/2022 0.01     nRBC, Absolute 01/05/2022 0.00     Diff Type 01/05/2022 Auto     Creatinine, Urine 01/05/2022 130     Microalbumin 01/05/2022 0.8     Microalbumin/Creatinine * 01/05/2022 6.2     Protein, Urine 01/05/2022 11.1        Last Imaging:  X-Ray Knee AP LAT with [place] Left  Narrative: EXAMINATION:  XR KNEE AP LAT WITH [place] LEFT    CLINICAL HISTORY:  Contusion of left hip, subsequent encounter    COMPARISON:  None    TECHNIQUE:  Frontal, lateral, and [place] views of the left knee.    FINDINGS:  Mild tricompartmental degenerative change.  Spurring of the superior pole of patella.  Suspect small suprapatellar joint effusion.  No convincing acute fracture or dislocation.  Impression: As above.    Point of Service: [facility]    Electronically signed by: Denis Medina  Date:    09/07/2021  Time:    14:23         Review of Systems   All other systems reviewed and are negative.        Objective:      Physical Exam  Vitals reviewed.   Constitutional:       Appearance: Normal appearance.   Cardiovascular:      Rate and Rhythm: Regular rhythm. Bradycardia present.      Pulses: Normal pulses.      Heart sounds: Normal heart sounds.   Pulmonary:      Effort: Pulmonary effort is normal.      Breath sounds: Normal breath sounds.   Abdominal:      General: Abdomen is flat. Bowel sounds are normal.      Palpations: Abdomen is soft.   Musculoskeletal:         General: Normal range of motion.      Cervical back: Normal range of motion and neck supple.   Skin:     General: Skin is warm and dry.   Neurological:      General: No focal deficit present.      Mental Status: She is alert and oriented to person, place, and time. Mental status is at baseline.         Assessment:       1. Type 2 diabetes mellitus without complication, without long-term current use of insulin  Basic Metabolic Panel    Hemoglobin A1C    Lipid Panel    Basic Metabolic Panel    Hemoglobin A1C    Lipid Panel    2. Essential hypertension  Basic Metabolic Panel    Hemoglobin A1C    Lipid Panel    Basic Metabolic Panel    Hemoglobin A1C    Lipid Panel   3. Hyperlipidemia, unspecified hyperlipidemia type     4. Bradycardia  POCT EKG 12-LEAD (NOT FOR [facility] USE)   5. Coronary artery disease of native artery of native heart with stable angina pectoris          Plan:         Karie was seen today for follow-up, results and diabetes.    Diagnoses and all orders for this visit:    Type 2 diabetes mellitus without complication, without long-term current use of insulin  -     Basic Metabolic Panel; Future  -     Hemoglobin A1C; Future  -     Lipid Panel; Future  -     Basic Metabolic Panel  -     Hemoglobin A1C  -     Lipid Panel    Essential hypertension  -     Basic Metabolic Panel; Future  -     Hemoglobin A1C; Future  -     Lipid Panel; Future  -     Basic Metabolic Panel  -     Hemoglobin A1C  -     Lipid Panel    Hyperlipidemia, unspecified hyperlipidemia type    Bradycardia  -     POCT EKG 12-LEAD (NOT FOR [facility] USE)    Coronary artery disease of native artery of native heart with stable angina pectoris

## 2022-04-06 ENCOUNTER — TELEPHONE (OUTPATIENT)
Dept: FAMILY MEDICINE | Facility: CLINIC | Age: 67
End: 2022-04-06
Payer: COMMERCIAL

## 2022-04-06 NOTE — TELEPHONE ENCOUNTER
----- Message from Gabriel Mckenzie MD sent at 4/5/2022  4:22 PM CDT -----  NEED TO SEE IN 2 WEEKS  [facility] LABS

## 2022-04-06 NOTE — TELEPHONE ENCOUNTER
Pt called to ask about lab results. Results were told to pt and f/u appt scheduled to April 20, 2022.

## 2022-05-16 ENCOUNTER — PATIENT OUTREACH (OUTPATIENT)
Dept: FAMILY MEDICINE | Facility: CLINIC | Age: 67
End: 2022-05-16
Payer: COMMERCIAL

## 2022-07-05 ENCOUNTER — OFFICE VISIT (OUTPATIENT)
Dept: FAMILY MEDICINE | Facility: CLINIC | Age: 67
End: 2022-07-05
Payer: COMMERCIAL

## 2022-07-05 VITALS
HEIGHT: 67 IN | HEART RATE: 56 BPM | TEMPERATURE: 97 F | WEIGHT: 228.63 LBS | SYSTOLIC BLOOD PRESSURE: 126 MMHG | BODY MASS INDEX: 35.88 KG/M2 | RESPIRATION RATE: 20 BRPM | OXYGEN SATURATION: 99 % | DIASTOLIC BLOOD PRESSURE: 78 MMHG

## 2022-07-05 DIAGNOSIS — I10 ESSENTIAL HYPERTENSION: ICD-10-CM

## 2022-07-05 DIAGNOSIS — E11.9 TYPE 2 DIABETES MELLITUS WITHOUT COMPLICATION, WITHOUT LONG-TERM CURRENT USE OF INSULIN: Primary | ICD-10-CM

## 2022-07-05 DIAGNOSIS — J30.89 SEASONAL ALLERGIC RHINITIS DUE TO OTHER ALLERGIC TRIGGER: ICD-10-CM

## 2022-07-05 DIAGNOSIS — E83.52 HYPERCALCEMIA: ICD-10-CM

## 2022-07-05 DIAGNOSIS — E66.9 OBESITY (BMI 35.0-39.9 WITHOUT COMORBIDITY): ICD-10-CM

## 2022-07-05 DIAGNOSIS — J06.9 UPPER RESPIRATORY TRACT INFECTION, UNSPECIFIED TYPE: ICD-10-CM

## 2022-07-05 LAB
ANION GAP SERPL CALCULATED.3IONS-SCNC: 7 MMOL/L (ref 7–16)
BUN SERPL-MCNC: 23 MG/DL (ref 7–18)
BUN/CREAT SERPL: 27 (ref 6–20)
CALCIUM SERPL-MCNC: 10 MG/DL (ref 8.5–10.1)
CHLORIDE SERPL-SCNC: 107 MMOL/L (ref 98–107)
CO2 SERPL-SCNC: 30 MMOL/L (ref 21–32)
CREAT SERPL-MCNC: 0.86 MG/DL (ref 0.55–1.02)
CREAT UR-MCNC: 32 MG/DL (ref 28–219)
EST. AVERAGE GLUCOSE BLD GHB EST-MCNC: 130 MG/DL
GLUCOSE SERPL-MCNC: 161 MG/DL (ref 74–106)
HBA1C MFR BLD HPLC: 6.5 % (ref 4.5–6.6)
MICROALBUMIN UR-MCNC: <0.5 MG/DL (ref 0–2.8)
MICROALBUMIN/CREAT RATIO PNL UR: <15.6 MG/G (ref 0–30)
POTASSIUM SERPL-SCNC: 4.6 MMOL/L (ref 3.5–5.1)
PROT UR-MCNC: 8.8 MG/DL (ref 0–11.9)
SODIUM SERPL-SCNC: 139 MMOL/L (ref 136–145)

## 2022-07-05 PROCEDURE — 99214 OFFICE O/P EST MOD 30 MIN: CPT | Mod: ,,, | Performed by: INTERNAL MEDICINE

## 2022-07-05 PROCEDURE — 82570 ASSAY OF URINE CREATININE: CPT | Mod: ,,, | Performed by: CLINICAL MEDICAL LABORATORY

## 2022-07-05 PROCEDURE — 83036 HEMOGLOBIN A1C: ICD-10-PCS | Mod: ,,, | Performed by: CLINICAL MEDICAL LABORATORY

## 2022-07-05 PROCEDURE — 96372 PR INJECTION,THERAP/PROPH/DIAG2ST, IM OR SUBCUT: ICD-10-PCS | Mod: ,,, | Performed by: INTERNAL MEDICINE

## 2022-07-05 PROCEDURE — 80048 BASIC METABOLIC PNL TOTAL CA: CPT | Mod: ,,, | Performed by: CLINICAL MEDICAL LABORATORY

## 2022-07-05 PROCEDURE — 83036 HEMOGLOBIN GLYCOSYLATED A1C: CPT | Mod: ,,, | Performed by: CLINICAL MEDICAL LABORATORY

## 2022-07-05 PROCEDURE — 84156 PROTEIN, QUANTITATIVE, URINE RANDOM: ICD-10-PCS | Mod: ,,, | Performed by: CLINICAL MEDICAL LABORATORY

## 2022-07-05 PROCEDURE — 96372 THER/PROPH/DIAG INJ SC/IM: CPT | Mod: ,,, | Performed by: INTERNAL MEDICINE

## 2022-07-05 PROCEDURE — 82570 MICROALBUMIN / CREATININE RATIO URINE: ICD-10-PCS | Mod: ,,, | Performed by: CLINICAL MEDICAL LABORATORY

## 2022-07-05 PROCEDURE — 80048 BASIC METABOLIC PANEL: ICD-10-PCS | Mod: ,,, | Performed by: CLINICAL MEDICAL LABORATORY

## 2022-07-05 PROCEDURE — 84156 ASSAY OF PROTEIN URINE: CPT | Mod: ,,, | Performed by: CLINICAL MEDICAL LABORATORY

## 2022-07-05 PROCEDURE — 99214 PR OFFICE/OUTPT VISIT, EST, LEVL IV, 30-39 MIN: ICD-10-PCS | Mod: ,,, | Performed by: INTERNAL MEDICINE

## 2022-07-05 PROCEDURE — 82043 UR ALBUMIN QUANTITATIVE: CPT | Mod: ,,, | Performed by: CLINICAL MEDICAL LABORATORY

## 2022-07-05 PROCEDURE — 82043 MICROALBUMIN / CREATININE RATIO URINE: ICD-10-PCS | Mod: ,,, | Performed by: CLINICAL MEDICAL LABORATORY

## 2022-07-05 RX ORDER — METFORMIN HYDROCHLORIDE 500 MG/1
500 TABLET ORAL 2 TIMES DAILY
Qty: 180 TABLET | Refills: 1 | Status: SHIPPED | OUTPATIENT
Start: 2022-07-05 | End: 2023-01-01

## 2022-07-05 RX ORDER — GLIPIZIDE 10 MG/1
10 TABLET ORAL 2 TIMES DAILY
Qty: 180 TABLET | Refills: 1 | Status: SHIPPED | OUTPATIENT
Start: 2022-07-05 | End: 2023-01-10 | Stop reason: SDUPTHER

## 2022-07-05 RX ORDER — CEFTRIAXONE 1 G/1
1 INJECTION, POWDER, FOR SOLUTION INTRAMUSCULAR; INTRAVENOUS
Status: COMPLETED | OUTPATIENT
Start: 2022-07-05 | End: 2022-07-05

## 2022-07-05 RX ADMIN — CEFTRIAXONE 1 G: 1 INJECTION, POWDER, FOR SOLUTION INTRAMUSCULAR; INTRAVENOUS at 09:07

## 2022-07-05 NOTE — PATIENT INSTRUCTIONS
Karie was seen today for follow-up, diabetes and hypertension.    Diagnoses and all orders for this visit:    Type 2 diabetes mellitus without complication, without long-term current use of insulin  -     Basic Metabolic Panel; Future  -     Hemoglobin A1C; Future  -     Microalbumin/Creatinine Ratio, Urine; Future  -     Protein, Random Urine; Future  -     Basic Metabolic Panel  -     Hemoglobin A1C  -     Microalbumin/Creatinine Ratio, Urine  -     Protein, Random Urine    Essential hypertension    Upper respiratory tract infection, unspecified type  -     cefTRIAXone injection 1 g    Seasonal allergic rhinitis due to other allergic trigger    Hypercalcemia    Obesity (BMI 35.0-39.9 without comorbidity)    Other orders  The following orders have not been finalized:  -     glipiZIDE (GLUCOTROL) 10 MG tablet  -     metFORMIN (GLUCOPHAGE) 500 MG tablet

## 2022-07-05 NOTE — PROGRESS NOTES
Subjective:       Patient ID: Karie Kaufman is a 66 y.o. female.    Chief Complaint: Follow-up, Diabetes, and Hypertension    Patient is here today for check up evaluation. Patient pressure is stable today. Patient states her glucose levels at home fluctuate depending on her diet. Will order labs today. Patient is complaining of sinus drainage with green drainage. Denies cough, fever, or chills. Will order Rocephin 1gm IM today. Patient is also requesting medication refills. Will refill today. Will follow in 3 months.       Current Medications:    Current Outpatient Medications:     acetaminophen (TYLENOL) 500 MG tablet, Take 500 mg by mouth every 6 (six) hours as needed for Pain., Disp: , Rfl:     aspirin (ECOTRIN) 81 MG EC tablet, Take 81 mg by mouth once daily., Disp: , Rfl:     atorvastatin (LIPITOR) 80 MG tablet, Take 80 mg by mouth once daily., Disp: , Rfl:     carvediloL (COREG) 6.25 MG tablet, Take 6.25 mg by mouth 2 (two) times daily., Disp: , Rfl:     clopidogreL (PLAVIX) 75 mg tablet, Take 75 mg by mouth once daily., Disp: , Rfl:     ezetimibe (ZETIA) 10 mg tablet, Take 10 mg by mouth once daily., Disp: , Rfl:     folic acid (FOLVITE) 1 MG tablet, Take 1,000 mcg by mouth once daily. for 90 days, Disp: , Rfl:     folic acid/multivit-min/lutein (CENTRUM SILVER ORAL), Take by mouth., Disp: , Rfl:     lisinopriL-hydrochlorothiazide (PRINZIDE,ZESTORETIC) 20-12.5 mg per tablet, Take 1 tablet by mouth 2 (two) times daily., Disp: , Rfl:     medroxyPROGESTERone (PROVERA) 10 MG tablet, Take 10 mg by mouth once daily., Disp: , Rfl:     mesalamine (LIALDA) 1.2 gram TbEC, Take 1.2 g by mouth 2 (two) times a day., Disp: , Rfl:     miSOPROStoL (CYTOTEC) 200 MCG Tab, INSERT ONE TABLET VAGINALLY AT BEDTIME WEDNESDAY NIGHT, Disp: , Rfl:     multivit-min/folic acid/biotin (HAIR,SKIN AND NAILS,FA-BIOTIN, ORAL), Take by mouth., Disp: , Rfl:     oxyCODONE-acetaminophen (PERCOCET) 5-325 mg per tablet, Take 1  tablet by mouth every 6 (six) hours as needed., Disp: , Rfl:     progesterone (PROMETRIUM) 200 MG capsule, Take 200 mg by mouth nightly., Disp: , Rfl:     triamterene-hydrochlorothiazide 37.5-25 mg (MAXZIDE-25) 37.5-25 mg per tablet, Take 1 tablet by mouth once daily., Disp: , Rfl:     vitamin D (VITAMIN D3) 1000 units Tab, Take 1,000 Units by mouth once daily., Disp: , Rfl:     glipiZIDE (GLUCOTROL) 10 MG tablet, Take 1 tablet (10 mg total) by mouth 2 (two) times a day., Disp: 180 tablet, Rfl: 1    metFORMIN (GLUCOPHAGE) 500 MG tablet, Take 1 tablet (500 mg total) by mouth 2 (two) times a day., Disp: 180 tablet, Rfl: 1    Last Labs:     No visits with results within 1 Month(s) from this visit.   Latest known visit with results is:   Patient Outreach on 05/16/2022   Component Date Value    CRC Recommendation Exter* 05/20/2014 Repeat colonoscopy in 3 years        Last Imaging:  X-Ray Knee AP LAT with [place] Left  Narrative: EXAMINATION:  XR KNEE AP LAT WITH [place] LEFT    CLINICAL HISTORY:  Contusion of left hip, subsequent encounter    COMPARISON:  None    TECHNIQUE:  Frontal, lateral, and sunrise views of the left knee.    FINDINGS:  Mild tricompartmental degenerative change.  Spurring of the superior pole of patella.  Suspect small suprapatellar joint effusion.  No convincing acute fracture or dislocation.  Impression: As above.    Point of Service: [facility]    Electronically signed by: Denis Medina  Date:    09/07/2021  Time:    14:23         Review of Systems   HENT: Positive for nasal congestion, postnasal drip and sinus pressure/congestion.    All other systems reviewed and are negative.        Objective:      Physical Exam  Vitals reviewed.   Constitutional:       Appearance: Normal appearance.   Cardiovascular:      Rate and Rhythm: Normal rate and regular rhythm.      Pulses: Normal pulses.      Heart sounds: Normal heart sounds.   Pulmonary:      Effort: Pulmonary effort is normal.       Breath sounds: Normal breath sounds.   Abdominal:      General: Abdomen is flat. Bowel sounds are normal.      Palpations: Abdomen is soft.   Musculoskeletal:         General: Normal range of motion.      Cervical back: Normal range of motion and neck supple.   Skin:     General: Skin is warm and dry.   Neurological:      General: No focal deficit present.      Mental Status: She is alert and oriented to person, place, and time. Mental status is at baseline.         Assessment:       1. Type 2 diabetes mellitus without complication, without long-term current use of insulin     2. Essential hypertension          Plan:         Karie was seen today for follow-up, diabetes and hypertension.    Diagnoses and all orders for this visit:    Type 2 diabetes mellitus without complication, without long-term current use of insulin    Essential hypertension    Other orders  The following orders have not been finalized:  -     glipiZIDE (GLUCOTROL) 10 MG tablet  -     metFORMIN (GLUCOPHAGE) 500 MG tablet

## 2022-08-15 LAB
LEFT EYE DM RETINOPATHY: POSITIVE
RIGHT EYE DM RETINOPATHY: POSITIVE

## 2022-10-05 ENCOUNTER — OFFICE VISIT (OUTPATIENT)
Dept: FAMILY MEDICINE | Facility: CLINIC | Age: 67
End: 2022-10-05
Payer: MEDICARE

## 2022-10-05 VITALS
SYSTOLIC BLOOD PRESSURE: 114 MMHG | DIASTOLIC BLOOD PRESSURE: 76 MMHG | HEIGHT: 67 IN | OXYGEN SATURATION: 99 % | RESPIRATION RATE: 18 BRPM | WEIGHT: 225 LBS | TEMPERATURE: 97 F | BODY MASS INDEX: 35.31 KG/M2 | HEART RATE: 56 BPM

## 2022-10-05 DIAGNOSIS — Z11.59 ENCOUNTER FOR HEPATITIS C SCREENING TEST FOR LOW RISK PATIENT: ICD-10-CM

## 2022-10-05 DIAGNOSIS — E78.5 HYPERLIPIDEMIA, UNSPECIFIED HYPERLIPIDEMIA TYPE: ICD-10-CM

## 2022-10-05 DIAGNOSIS — E11.9 TYPE 2 DIABETES MELLITUS WITHOUT COMPLICATION, WITHOUT LONG-TERM CURRENT USE OF INSULIN: Primary | ICD-10-CM

## 2022-10-05 DIAGNOSIS — Z13.820 ENCOUNTER FOR OSTEOPOROSIS SCREENING IN ASYMPTOMATIC POSTMENOPAUSAL PATIENT: ICD-10-CM

## 2022-10-05 DIAGNOSIS — Z78.0 ENCOUNTER FOR OSTEOPOROSIS SCREENING IN ASYMPTOMATIC POSTMENOPAUSAL PATIENT: ICD-10-CM

## 2022-10-05 DIAGNOSIS — Z11.4 SCREENING FOR HIV (HUMAN IMMUNODEFICIENCY VIRUS): ICD-10-CM

## 2022-10-05 LAB
HCV AB SER QL: NORMAL
HIV 1+O+2 AB SERPL QL: NORMAL

## 2022-10-05 PROCEDURE — 1159F PR MEDICATION LIST DOCUMENTED IN MEDICAL RECORD: ICD-10-PCS | Mod: ,,, | Performed by: INTERNAL MEDICINE

## 2022-10-05 PROCEDURE — 3008F PR BODY MASS INDEX (BMI) DOCUMENTED: ICD-10-PCS | Mod: ,,, | Performed by: INTERNAL MEDICINE

## 2022-10-05 PROCEDURE — 99214 OFFICE O/P EST MOD 30 MIN: CPT | Mod: ,,, | Performed by: INTERNAL MEDICINE

## 2022-10-05 PROCEDURE — 87389 HIV 1 / 2 ANTIBODY: ICD-10-PCS | Mod: ,,, | Performed by: CLINICAL MEDICAL LABORATORY

## 2022-10-05 PROCEDURE — 86803 HEPATITIS C AB TEST: CPT | Mod: ,,, | Performed by: CLINICAL MEDICAL LABORATORY

## 2022-10-05 PROCEDURE — 86803 HEPATITIS C ANTIBODY: ICD-10-PCS | Mod: ,,, | Performed by: CLINICAL MEDICAL LABORATORY

## 2022-10-05 PROCEDURE — 4010F ACE/ARB THERAPY RXD/TAKEN: CPT | Mod: ,,, | Performed by: INTERNAL MEDICINE

## 2022-10-05 PROCEDURE — 1159F MED LIST DOCD IN RCRD: CPT | Mod: ,,, | Performed by: INTERNAL MEDICINE

## 2022-10-05 PROCEDURE — 1126F AMNT PAIN NOTED NONE PRSNT: CPT | Mod: ,,, | Performed by: INTERNAL MEDICINE

## 2022-10-05 PROCEDURE — 3066F NEPHROPATHY DOC TX: CPT | Mod: ,,, | Performed by: INTERNAL MEDICINE

## 2022-10-05 PROCEDURE — 3288F FALL RISK ASSESSMENT DOCD: CPT | Mod: ,,, | Performed by: INTERNAL MEDICINE

## 2022-10-05 PROCEDURE — 1126F PR PAIN SEVERITY QUANTIFIED, NO PAIN PRESENT: ICD-10-PCS | Mod: ,,, | Performed by: INTERNAL MEDICINE

## 2022-10-05 PROCEDURE — 3061F NEG MICROALBUMINURIA REV: CPT | Mod: ,,, | Performed by: INTERNAL MEDICINE

## 2022-10-05 PROCEDURE — 1101F PR PT FALLS ASSESS DOC 0-1 FALLS W/OUT INJ PAST YR: ICD-10-PCS | Mod: ,,, | Performed by: INTERNAL MEDICINE

## 2022-10-05 PROCEDURE — 3008F BODY MASS INDEX DOCD: CPT | Mod: ,,, | Performed by: INTERNAL MEDICINE

## 2022-10-05 PROCEDURE — 1160F PR REVIEW ALL MEDS BY PRESCRIBER/CLIN PHARMACIST DOCUMENTED: ICD-10-PCS | Mod: ,,, | Performed by: INTERNAL MEDICINE

## 2022-10-05 PROCEDURE — 3078F DIAST BP <80 MM HG: CPT | Mod: ,,, | Performed by: INTERNAL MEDICINE

## 2022-10-05 PROCEDURE — 3074F PR MOST RECENT SYSTOLIC BLOOD PRESSURE < 130 MM HG: ICD-10-PCS | Mod: ,,, | Performed by: INTERNAL MEDICINE

## 2022-10-05 PROCEDURE — 3074F SYST BP LT 130 MM HG: CPT | Mod: ,,, | Performed by: INTERNAL MEDICINE

## 2022-10-05 PROCEDURE — 87389 HIV-1 AG W/HIV-1&-2 AB AG IA: CPT | Mod: ,,, | Performed by: CLINICAL MEDICAL LABORATORY

## 2022-10-05 PROCEDURE — 3078F PR MOST RECENT DIASTOLIC BLOOD PRESSURE < 80 MM HG: ICD-10-PCS | Mod: ,,, | Performed by: INTERNAL MEDICINE

## 2022-10-05 PROCEDURE — 3288F PR FALLS RISK ASSESSMENT DOCUMENTED: ICD-10-PCS | Mod: ,,, | Performed by: INTERNAL MEDICINE

## 2022-10-05 PROCEDURE — 4010F PR ACE/ARB THEARPY RXD/TAKEN: ICD-10-PCS | Mod: ,,, | Performed by: INTERNAL MEDICINE

## 2022-10-05 PROCEDURE — 1101F PT FALLS ASSESS-DOCD LE1/YR: CPT | Mod: ,,, | Performed by: INTERNAL MEDICINE

## 2022-10-05 PROCEDURE — 3044F HG A1C LEVEL LT 7.0%: CPT | Mod: ,,, | Performed by: INTERNAL MEDICINE

## 2022-10-05 PROCEDURE — 3044F PR MOST RECENT HEMOGLOBIN A1C LEVEL <7.0%: ICD-10-PCS | Mod: ,,, | Performed by: INTERNAL MEDICINE

## 2022-10-05 PROCEDURE — 3061F PR NEG MICROALBUMINURIA RESULT DOCUMENTED/REVIEW: ICD-10-PCS | Mod: ,,, | Performed by: INTERNAL MEDICINE

## 2022-10-05 PROCEDURE — 1160F RVW MEDS BY RX/DR IN RCRD: CPT | Mod: ,,, | Performed by: INTERNAL MEDICINE

## 2022-10-05 PROCEDURE — 99214 PR OFFICE/OUTPT VISIT, EST, LEVL IV, 30-39 MIN: ICD-10-PCS | Mod: ,,, | Performed by: INTERNAL MEDICINE

## 2022-10-05 PROCEDURE — 3066F PR DOCUMENTATION OF TREATMENT FOR NEPHROPATHY: ICD-10-PCS | Mod: ,,, | Performed by: INTERNAL MEDICINE

## 2022-10-05 RX ORDER — METFORMIN HYDROCHLORIDE 500 MG/1
500 TABLET ORAL 3 TIMES DAILY
Qty: 90 TABLET | Refills: 0 | Status: SHIPPED | OUTPATIENT
Start: 2022-10-05 | End: 2023-01-10 | Stop reason: SDUPTHER

## 2022-10-05 NOTE — PATIENT INSTRUCTIONS
Karie was seen today for diabetes, hypertension and hyperlipidemia.    Diagnoses and all orders for this visit:    Type 2 diabetes mellitus without complication, without long-term current use of insulin  -     Basic Metabolic Panel; Future  -     Hemoglobin A1C; Future    Screening for HIV (human immunodeficiency virus)  -     HIV 1/2 Ag/Ab (4th Gen); Future  -     HIV 1/2 Ag/Ab (4th Gen)    Encounter for hepatitis C screening test for low risk patient  -     Hepatitis C Antibody; Future  -     Hepatitis C Antibody    Encounter for osteoporosis screening in asymptomatic postmenopausal patient  -     DXA Bone Density Spine And Hip; Future    Hyperlipidemia, unspecified hyperlipidemia type    Other orders  The following orders have not been finalized:  -     metFORMIN (GLUCOPHAGE) 500 MG tablet

## 2022-10-05 NOTE — PROGRESS NOTES
Subjective:       Patient ID: Karie Kaufman is a 66 y.o. female.    Chief Complaint: Diabetes, Hypertension, and Hyperlipidemia    Patient is here today for check up evaluation. Patient recent labs reviewed and shows glucose increased at 160. Feels her glucose is increased mainly in the mornings and from stress. Patient is currently taking Metformin 500 mg PO BID and glipizide 10 mg PO BID. Will increased Metformin to 500 mg TID, take 2 in AM and 1 in PM. Will order repeat labs in 2 months. Will follow in 2 months.     Current Medications:    Current Outpatient Medications:     acetaminophen (TYLENOL) 500 MG tablet, Take 500 mg by mouth every 6 (six) hours as needed for Pain., Disp: , Rfl:     aspirin (ECOTRIN) 81 MG EC tablet, Take 81 mg by mouth once daily., Disp: , Rfl:     atorvastatin (LIPITOR) 80 MG tablet, Take 80 mg by mouth once daily., Disp: , Rfl:     carvediloL (COREG) 6.25 MG tablet, Take 6.25 mg by mouth 2 (two) times daily., Disp: , Rfl:     clopidogreL (PLAVIX) 75 mg tablet, Take 75 mg by mouth once daily., Disp: , Rfl:     glipiZIDE (GLUCOTROL) 10 MG tablet, Take 1 tablet (10 mg total) by mouth 2 (two) times a day., Disp: 180 tablet, Rfl: 1    lisinopriL-hydrochlorothiazide (PRINZIDE,ZESTORETIC) 20-12.5 mg per tablet, Take 1 tablet by mouth 2 (two) times daily., Disp: , Rfl:     metFORMIN (GLUCOPHAGE) 500 MG tablet, Take 1 tablet (500 mg total) by mouth 2 (two) times a day., Disp: 180 tablet, Rfl: 1    miSOPROStoL (CYTOTEC) 200 MCG Tab, INSERT ONE TABLET VAGINALLY AT BEDTIME WEDNESDAY NIGHT, Disp: , Rfl:     multivit-min/folic acid/biotin (HAIR,SKIN AND NAILS,FA-BIOTIN, ORAL), Take by mouth., Disp: , Rfl:     triamterene-hydrochlorothiazide 37.5-25 mg (MAXZIDE-25) 37.5-25 mg per tablet, Take 1 tablet by mouth once daily., Disp: , Rfl:     vitamin D (VITAMIN D3) 1000 units Tab, Take 1,000 Units by mouth once daily., Disp: , Rfl:     Last Labs:     Office Visit on 10/05/2022   Component Date Value     Hepatitis C Ab 10/05/2022 Non-Reactive     HIV 1/2 10/05/2022 Non-Reactive        Last Imaging:  X-Ray Knee AP LAT with [place] Left  Narrative: EXAMINATION:  XR KNEE AP LAT WITH [place] LEFT    CLINICAL HISTORY:  Contusion of left hip, subsequent encounter    COMPARISON:  None    TECHNIQUE:  Frontal, lateral, and sunrise views of the left knee.    FINDINGS:  Mild tricompartmental degenerative change.  Spurring of the superior pole of patella.  Suspect small suprapatellar joint effusion.  No convincing acute fracture or dislocation.  Impression: As above.    Point of Service: [facility]    Electronically signed by: Denis Medina  Date:    09/07/2021  Time:    14:23         Review of Systems   All other systems reviewed and are negative.      Objective:      Physical Exam  Vitals reviewed.   Constitutional:       Appearance: Normal appearance.   Cardiovascular:      Rate and Rhythm: Normal rate and regular rhythm.      Pulses: Normal pulses.      Heart sounds: Normal heart sounds.   Pulmonary:      Effort: Pulmonary effort is normal.      Breath sounds: Normal breath sounds.   Abdominal:      General: Abdomen is flat. Bowel sounds are normal.      Palpations: Abdomen is soft.   Musculoskeletal:         General: Normal range of motion.      Cervical back: Normal range of motion and neck supple.   Skin:     General: Skin is warm and dry.   Neurological:      General: No focal deficit present.      Mental Status: She is alert and oriented to person, place, and time. Mental status is at baseline.       Assessment:       1. Type 2 diabetes mellitus without complication, without long-term current use of insulin  Basic Metabolic Panel    Hemoglobin A1C      2. Screening for HIV (human immunodeficiency virus)  HIV 1/2 Ag/Ab (4th Gen)    HIV 1/2 Ag/Ab (4th Gen)      3. Encounter for hepatitis C screening test for low risk patient  Hepatitis C Antibody    Hepatitis C Antibody      4. Encounter for osteoporosis  screening in asymptomatic postmenopausal patient  DXA Bone Density Spine And Hip      5. Hyperlipidemia, unspecified hyperlipidemia type             Plan:         Karie was seen today for diabetes, hypertension and hyperlipidemia.    Diagnoses and all orders for this visit:    Type 2 diabetes mellitus without complication, without long-term current use of insulin  -     Basic Metabolic Panel; Future  -     Hemoglobin A1C; Future    Screening for HIV (human immunodeficiency virus)  -     HIV 1/2 Ag/Ab (4th Gen); Future  -     HIV 1/2 Ag/Ab (4th Gen)    Encounter for hepatitis C screening test for low risk patient  -     Hepatitis C Antibody; Future  -     Hepatitis C Antibody    Encounter for osteoporosis screening in asymptomatic postmenopausal patient  -     DXA Bone Density Spine And Hip; Future    Hyperlipidemia, unspecified hyperlipidemia type    Other orders  The following orders have not been finalized:  -     metFORMIN (GLUCOPHAGE) 500 MG tablet

## 2022-10-31 LAB — BCS RECOMMENDATION EXT: NORMAL

## 2022-12-05 NOTE — PROGRESS NOTES
"   [facility]     PATIENT NAME: Karie Kaufman   : 1955    AGE: 67 y.o. DATE: 2022   MRN: [id number]        Reason for Visit / Chief Complaint: Medicare AWV ([facility] AWV visit )        Karie Kaufman presents for a [facility] Annual Wellness Visit today. Ms. Kaufman gets her primary care needs met by Dr. Gabriel Mckenzie. She is followed by Dr. Donnelly for her cardiology needs at [facility]. She reports having triple bypass  due to ASHD, Hyperlipidemia, Hypertension, Diabetes Mellitus Type 2, and Obesity. She reports that her blood glucose levels are up and down. She reports that her memory is not as good as it use to be. She report leaving her door open all day. She didn't remember leaving it open. She reports having a family history of Alzheimer's. She had colonoscopy in 2022. She signed JEMMA to get copy of eye exam, mammogram, and pap results.  She reports being up-to-date on her immunizations. Ms. Kaufman BMI is 35.87 she does report walking at [facility] when she can and going to [place] to ride stationary bike. She reports only eating and preparing meals in the home 50% secondary to living alone.     Ms. Kaufman is , she has 2 boys and one girl. Her daughter lives in [place], [place] and her two sons in [place], [place]. She has 9 grandchildren. Daughter has 6, one son as two; her oldest is 27 and youngest is two. 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     Review of Systems   Constitutional:  Negative for chills and fever.   HENT:  Negative for congestion, hearing loss and sore throat.    Respiratory:  Negative for cough and shortness of breath.    Cardiovascular:  Negative for chest pain, palpitations and leg swelling.   Gastrointestinal:  Positive for constipation ("a little constipation"). Negative for abdominal pain and diarrhea.   Genitourinary:  Negative for dysuria, frequency and " "urgency.   Musculoskeletal:  Negative for back pain, joint pain and neck pain.   Neurological:  Positive for dizziness ("when I stand up after sitting with eyes closed in [place] sometimes"). Negative for headaches.   Psychiatric/Behavioral:  Negative for depression. The patient is not nervous/anxious and does not have insomnia.       The following components were reviewed and updated:      Medical/Social/Family History:  Past Medical History:   Diagnosis Date    Diabetes mellitus     Hypertension         Family History   Problem Relation Age of Onset    Hypertension Paternal Grandfather     Hypertension Paternal Grandmother     Diabetes Maternal Grandmother     Hypertension Father     Cancer Father         Past Surgical History:   Procedure Laterality Date    CORONARY ARTERY BYPASS GRAFT (CABG)  2017    Quadruple Bypass    HYSTEROSCOPY WITH HYDROTHERMAL ABLATION OF ENDOMETRIUM WITH DILATION AND CURETTAGE N/A 12/30/2021    Procedure: HYSTEROSCOPY, WITH DILATION AND CURETTAGE OF UTERUS AND ENDOMETRIAL ABLATION;  Surgeon: Sarah Cueva DO;  Location: [facility];  Service: OB/GYN;  Laterality: N/A;       Social History     Tobacco Use   Smoking Status Never   Smokeless Tobacco Never       Social History     Substance and Sexual Activity   Alcohol Use Never         Allergies and Current Medications   Review of patient's allergies indicates:  No Known Allergies    Current Outpatient Medications:     acetaminophen (TYLENOL) 500 MG tablet, Take 500 mg by mouth every 6 (six) hours as needed for Pain., Disp: , Rfl:     aspirin (ECOTRIN) 81 MG EC tablet, Take 81 mg by mouth once daily., Disp: , Rfl:     atorvastatin (LIPITOR) 80 MG tablet, Take 80 mg by mouth once daily., Disp: , Rfl:     carvediloL (COREG) 6.25 MG tablet, Take 6.25 mg by mouth 2 (two) times daily., Disp: , Rfl:     clopidogreL (PLAVIX) 75 mg tablet, Take 75 mg by mouth once daily., Disp: , Rfl:     ezetimibe (ZETIA) 10 mg tablet, Take 10 mg by mouth " once daily., Disp: , Rfl:     glipiZIDE (GLUCOTROL) 10 MG tablet, Take 1 tablet (10 mg total) by mouth 2 (two) times a day., Disp: 180 tablet, Rfl: 1    lisinopriL-hydrochlorothiazide (PRINZIDE,ZESTORETIC) 20-12.5 mg per tablet, Take 1 tablet by mouth 2 (two) times daily., Disp: , Rfl:     metFORMIN (GLUCOPHAGE) 500 MG tablet, Take 1 tablet (500 mg total) by mouth 2 (two) times a day., Disp: 180 tablet, Rfl: 1    metFORMIN (GLUCOPHAGE) 500 MG tablet, Take 1 tablet (500 mg total) by mouth 3 (three) times daily., Disp: 90 tablet, Rfl: 0    multivit-min/folic acid/biotin (HAIR,SKIN AND NAILS,FA-BIOTIN, ORAL), Take by mouth., Disp: , Rfl:     triamterene-hydrochlorothiazide 37.5-25 mg (MAXZIDE-25) 37.5-25 mg per tablet, Take 1 tablet by mouth once daily., Disp: , Rfl:     vitamin D (VITAMIN D3) 1000 units Tab, Take 1,000 Units by mouth once daily., Disp: , Rfl:     miSOPROStoL (CYTOTEC) 200 MCG Tab, INSERT ONE TABLET VAGINALLY AT BEDTIME WEDNESDAY NIGHT, Disp: , Rfl:       Health Risk Assessment   Fall Risk: not at risk    Obesity: BMI Body mass index is 35.87 kg/m².   Advance Directive: no but information given   Depression: PHQ9- 0   HTN:  DASH diet, exercise, weight management, med compliance, home BP monitoring, and follow-up discussed.   T2DM:  diabetic diet, glucose monitoring, activity level, weight management, med compliance, and follow-up discussed.  STI: not at risk   Statin Use: yes      Health Maintenance   Last eye exam: saw Dr. Syed 9/22-JEMMA faxed   Last CV screen with lipids: 4/5/22    Diabetes screening with fasting glucose or A1c: 7/5/22   Colonoscopy: 6/15/20 Dr. Biswas   Flu Vaccine: 9/7/22 needs JEMMA   Pneumonia vaccines: 1/5/22   COVID vaccine: (pfizer) 3/31/22, 10/10/21, 3/3/21, 2/9/21, 9/17/22   Hep B vaccine: NA   DEXA: ordered 10/5/22   Last pap/pelvic: JEMMA faxed   Last Mammogram: 10/31/22 [facility] to [facility]   AAA screening: NA   HIV Screening: 10/5/22 HIV 1/2 Ag/Ab  non-reactive  Hepatitis C Screen: 10/5/22 non reactive  Low Dose CT Scan: na    Health Maintenance Topics with due status: Not Due       Topic Last Completion Date    Colorectal Cancer Screening 09/30/2020    Lipid Panel 06/10/2022    Diabetes Urine Screening 07/05/2022    Hemoglobin A1c 07/05/2022    TETANUS VACCINE 08/18/2022    Eye Exam 09/01/2022    Aspirin/Antiplatelet Therapy 12/06/2022     Health Maintenance Due   Topic Date Due    Foot Exam  Never done    DEXA Scan  Never done    Pneumococcal Vaccines (Age 65+) (2 - PCV) 01/05/2023         Lab results available in Epic or see dates from [facility] above:   Lab Results   Component Value Date    CHOL 161 04/05/2022    CHOL 153 07/21/2021    CHOL 150 10/21/2020     Lab Results   Component Value Date    HDL 71 (H) 04/05/2022    HDL 67 (H) 07/21/2021    HDL 66 10/21/2020     Lab Results   Component Value Date    LDLCALC 79 04/05/2022    LDLCALC 75 07/21/2021    LDLCALC 71 10/21/2020     Lab Results   Component Value Date    TRIG 55 04/05/2022    TRIG 57 07/21/2021    TRIG 63 10/21/2020     Lab Results   Component Value Date    CHOLHDL 2.3 04/05/2022    CHOLHDL 2.3 07/21/2021    CHOLHDL 2.3 10/21/2020       Lab Results   Component Value Date    HGBA1C 6.5 07/05/2022       Sodium   Date Value Ref Range Status   07/05/2022 139 136 - 145 mmol/L Final     Potassium   Date Value Ref Range Status   07/05/2022 4.6 3.5 - 5.1 mmol/L Final     Chloride   Date Value Ref Range Status   07/05/2022 107 98 - 107 mmol/L Final     CO2   Date Value Ref Range Status   07/05/2022 30 21 - 32 mmol/L Final     Glucose   Date Value Ref Range Status   07/05/2022 161 (H) 74 - 106 mg/dL Final     BUN   Date Value Ref Range Status   07/05/2022 23 (H) 7 - 18 mg/dL Final     Creatinine   Date Value Ref Range Status   07/05/2022 0.86 0.55 - 1.02 mg/dL Final     Calcium   Date Value Ref Range Status   07/05/2022 10.0 8.5 - 10.1 mg/dL Final     Total Protein   Date Value Ref Range Status   07/21/2021  "7.8 6.4 - 8.2 g/dL Final     Albumin   Date Value Ref Range Status   07/21/2021 3.7 3.5 - 5.0 g/dL Final     Bilirubin, Total   Date Value Ref Range Status   07/21/2021 0.6 >0.0 - 1.2 mg/dL Final     Alk Phos   Date Value Ref Range Status   07/21/2021 70 50 - 130 U/L Final     AST   Date Value Ref Range Status   07/21/2021 19 15 - 37 U/L Final     ALT   Date Value Ref Range Status   07/21/2021 30 13 - 56 U/L Final     Anion Gap   Date Value Ref Range Status   07/05/2022 7 7 - 16 mmol/L Final     eGFR    Date Value Ref Range Status   07/21/2021 105 >=60 mL/min/1.73m² Final     eGFR   Date Value Ref Range Status   07/05/2022 70 >=60 mL/min/1.73m² Final         Incontinence  Bowel: no  Bladder: no      Care Team  Dr. Jonah Donnelly-cardiology                 Dr. Syed-optometry    **See Completed Assessments for Annual Wellness visit within the encounter summary    The following assessments were completed & reviewed:  Depression Screening  Cognitive function Screening  Timed Get Up Test  Whisper Test  Vision Screen  Health Risk Assessment  Checklist of ADLs and IADLs        Objective  Vitals:    12/06/22 0924 12/06/22 0929   BP: 100/60    Pulse: (!) 55 61   Resp: 16    Temp: 98.3 °F (36.8 °C)    TempSrc: Oral    SpO2: 98%    Weight: 103.9 kg (229 lb)    Height: 5' 7" (1.702 m)    PainSc: 0-No pain       Body mass index is 35.87 kg/m².  Ideal body weight: 61.6 kg (135 lb 12.9 oz)       Physical Exam  HENT:      Head: Normocephalic.      Right Ear: Tympanic membrane and external ear normal.      Left Ear: Tympanic membrane and external ear normal.   Cardiovascular:      Rate and Rhythm: Normal rate and regular rhythm.      Pulses: Normal pulses.      Heart sounds: Normal heart sounds.   Pulmonary:      Effort: Pulmonary effort is normal.      Breath sounds: Normal breath sounds.   Abdominal:      General: Bowel sounds are normal.      Palpations: Abdomen is soft.   Musculoskeletal:        "  General: Normal range of motion.        Feet:    Skin:     General: Skin is warm and dry.   Neurological:      Mental Status: She is alert and oriented to person, place, and time.   Psychiatric:         Mood and Affect: Mood normal.         Behavior: Behavior normal.       Assessment:     1. Encounter for annual wellness visit (AWV) in Medicare patient    2. Essential hypertension    3. Hyperlipidemia, unspecified hyperlipidemia type    4. Type 2 diabetes mellitus without complication, without long-term current use of insulin    5. Atherosclerosis of native coronary artery of native heart with other form of angina pectoris    6. BMI 35.0-35.9,adult         Plan:    Referrals:   None Today     Advised to call office if does not hear from anyone with referral appt within 2-3 weeks to check on status of referral. Voiced understanding.      Discussed and provided with a screening schedule and personal prevention plan in accordance with USPSTF age appropriate recommendations and Medicare screening guidelines.   Education, counseling, and referrals were provided as needed.  After Visit Summary printed and given to patient which includes written education and a list of any referrals if indicated.     Education including diet, exercise, falls, preventive health care for older adults and advanced directives discussed with patient and patient verbalized understanding.      F/U plan for yearly Annual Wellness Visit.  F/U with PCP as directed and as needed.     Signature: DIONNA Dewitt-BC

## 2022-12-06 ENCOUNTER — OFFICE VISIT (OUTPATIENT)
Dept: FAMILY MEDICINE | Facility: CLINIC | Age: 67
End: 2022-12-06
Payer: MEDICARE

## 2022-12-06 VITALS
RESPIRATION RATE: 16 BRPM | HEART RATE: 61 BPM | OXYGEN SATURATION: 98 % | BODY MASS INDEX: 35.94 KG/M2 | SYSTOLIC BLOOD PRESSURE: 100 MMHG | HEIGHT: 67 IN | TEMPERATURE: 98 F | WEIGHT: 229 LBS | DIASTOLIC BLOOD PRESSURE: 60 MMHG

## 2022-12-06 DIAGNOSIS — Z00.00 ENCOUNTER FOR ANNUAL WELLNESS VISIT (AWV) IN MEDICARE PATIENT: Primary | ICD-10-CM

## 2022-12-06 DIAGNOSIS — I25.118 ATHEROSCLEROSIS OF NATIVE CORONARY ARTERY OF NATIVE HEART WITH OTHER FORM OF ANGINA PECTORIS: ICD-10-CM

## 2022-12-06 DIAGNOSIS — E11.9 TYPE 2 DIABETES MELLITUS WITHOUT COMPLICATION, WITHOUT LONG-TERM CURRENT USE OF INSULIN: ICD-10-CM

## 2022-12-06 DIAGNOSIS — E78.5 HYPERLIPIDEMIA, UNSPECIFIED HYPERLIPIDEMIA TYPE: ICD-10-CM

## 2022-12-06 DIAGNOSIS — I10 ESSENTIAL HYPERTENSION: ICD-10-CM

## 2022-12-06 PROBLEM — I25.10 ATHSCL HEART DISEASE OF NATIVE CORONARY ARTERY W/O ANG PCTRS: Status: ACTIVE | Noted: 2022-12-06

## 2022-12-06 PROBLEM — K63.5 COLON POLYPS: Status: ACTIVE | Noted: 2022-12-06

## 2022-12-06 PROBLEM — K57.92 DIVERTICULITIS: Status: ACTIVE | Noted: 2022-12-06

## 2022-12-06 PROBLEM — K59.00 CONSTIPATION: Status: ACTIVE | Noted: 2022-12-06

## 2022-12-06 PROBLEM — K51.90 ULCERATIVE COLITIS: Status: ACTIVE | Noted: 2021-02-23

## 2022-12-06 PROBLEM — K92.1 HEMATOCHEZIA: Status: ACTIVE | Noted: 2022-12-06

## 2022-12-06 PROBLEM — I20.89 OTHER FORMS OF ANGINA PECTORIS: Status: ACTIVE | Noted: 2022-12-06

## 2022-12-06 PROCEDURE — 3061F NEG MICROALBUMINURIA REV: CPT | Mod: ,,, | Performed by: NURSE PRACTITIONER

## 2022-12-06 PROCEDURE — G0402 INITIAL PREVENTIVE EXAM: HCPCS | Mod: ,,, | Performed by: NURSE PRACTITIONER

## 2022-12-06 PROCEDURE — 3074F SYST BP LT 130 MM HG: CPT | Mod: ,,, | Performed by: NURSE PRACTITIONER

## 2022-12-06 PROCEDURE — 3044F HG A1C LEVEL LT 7.0%: CPT | Mod: ,,, | Performed by: NURSE PRACTITIONER

## 2022-12-06 PROCEDURE — 3078F PR MOST RECENT DIASTOLIC BLOOD PRESSURE < 80 MM HG: ICD-10-PCS | Mod: ,,, | Performed by: NURSE PRACTITIONER

## 2022-12-06 PROCEDURE — 3044F PR MOST RECENT HEMOGLOBIN A1C LEVEL <7.0%: ICD-10-PCS | Mod: ,,, | Performed by: NURSE PRACTITIONER

## 2022-12-06 PROCEDURE — 1126F PR PAIN SEVERITY QUANTIFIED, NO PAIN PRESENT: ICD-10-PCS | Mod: ,,, | Performed by: NURSE PRACTITIONER

## 2022-12-06 PROCEDURE — 1160F RVW MEDS BY RX/DR IN RCRD: CPT | Mod: ,,, | Performed by: NURSE PRACTITIONER

## 2022-12-06 PROCEDURE — 3074F PR MOST RECENT SYSTOLIC BLOOD PRESSURE < 130 MM HG: ICD-10-PCS | Mod: ,,, | Performed by: NURSE PRACTITIONER

## 2022-12-06 PROCEDURE — 3288F FALL RISK ASSESSMENT DOCD: CPT | Mod: ,,, | Performed by: NURSE PRACTITIONER

## 2022-12-06 PROCEDURE — G0402 PR WELCOME MEDICARE PREVENTIVE VISIT NEW ENROLLEE: ICD-10-PCS | Mod: ,,, | Performed by: NURSE PRACTITIONER

## 2022-12-06 PROCEDURE — 1159F PR MEDICATION LIST DOCUMENTED IN MEDICAL RECORD: ICD-10-PCS | Mod: ,,, | Performed by: NURSE PRACTITIONER

## 2022-12-06 PROCEDURE — 3061F PR NEG MICROALBUMINURIA RESULT DOCUMENTED/REVIEW: ICD-10-PCS | Mod: ,,, | Performed by: NURSE PRACTITIONER

## 2022-12-06 PROCEDURE — 3066F NEPHROPATHY DOC TX: CPT | Mod: ,,, | Performed by: NURSE PRACTITIONER

## 2022-12-06 PROCEDURE — 4010F PR ACE/ARB THEARPY RXD/TAKEN: ICD-10-PCS | Mod: ,,, | Performed by: NURSE PRACTITIONER

## 2022-12-06 PROCEDURE — 1126F AMNT PAIN NOTED NONE PRSNT: CPT | Mod: ,,, | Performed by: NURSE PRACTITIONER

## 2022-12-06 PROCEDURE — 3008F BODY MASS INDEX DOCD: CPT | Mod: ,,, | Performed by: NURSE PRACTITIONER

## 2022-12-06 PROCEDURE — 3008F PR BODY MASS INDEX (BMI) DOCUMENTED: ICD-10-PCS | Mod: ,,, | Performed by: NURSE PRACTITIONER

## 2022-12-06 PROCEDURE — 3078F DIAST BP <80 MM HG: CPT | Mod: ,,, | Performed by: NURSE PRACTITIONER

## 2022-12-06 PROCEDURE — 3066F PR DOCUMENTATION OF TREATMENT FOR NEPHROPATHY: ICD-10-PCS | Mod: ,,, | Performed by: NURSE PRACTITIONER

## 2022-12-06 PROCEDURE — 1101F PT FALLS ASSESS-DOCD LE1/YR: CPT | Mod: ,,, | Performed by: NURSE PRACTITIONER

## 2022-12-06 PROCEDURE — 1159F MED LIST DOCD IN RCRD: CPT | Mod: ,,, | Performed by: NURSE PRACTITIONER

## 2022-12-06 PROCEDURE — 3288F PR FALLS RISK ASSESSMENT DOCUMENTED: ICD-10-PCS | Mod: ,,, | Performed by: NURSE PRACTITIONER

## 2022-12-06 PROCEDURE — 1101F PR PT FALLS ASSESS DOC 0-1 FALLS W/OUT INJ PAST YR: ICD-10-PCS | Mod: ,,, | Performed by: NURSE PRACTITIONER

## 2022-12-06 PROCEDURE — 1160F PR REVIEW ALL MEDS BY PRESCRIBER/CLIN PHARMACIST DOCUMENTED: ICD-10-PCS | Mod: ,,, | Performed by: NURSE PRACTITIONER

## 2022-12-06 PROCEDURE — 4010F ACE/ARB THERAPY RXD/TAKEN: CPT | Mod: ,,, | Performed by: NURSE PRACTITIONER

## 2022-12-06 RX ORDER — EZETIMIBE 10 MG/1
10 TABLET ORAL DAILY
COMMUNITY

## 2022-12-06 NOTE — PATIENT INSTRUCTIONS
Counseling and Referral of Other Preventative  (Italic type indicates deductible and co-insurance are waived)    Patient Name: Karie Kaufman  Today's Date: 12/6/2022    Health Maintenance         Date Due Completion Date    Foot Exam Never done ---    DEXA Scan Never done ---[id number]/5/22    Pneumococcal Vaccines (Age 65+) (2 - PCV) 01/05/2023 1/5/2022    Mammogram 10/30/2023 (Originally 10/8/1973) ---    Hemoglobin A1c 01/05/2023 7/5/2022    Lipid Panel 06/10/2023 6/10/2022    Diabetes Urine Screening 07/05/2023 7/5/2022    Eye Exam 09/01/2023 9/1/2022 (Done)    Override on 9/1/2022: Done    Colorectal Cancer Screening 09/30/2030 9/30/2020    TETANUS VACCINE 08/18/2032 8/18/2022          No orders of the defined types were placed in this encounter.

## 2023-01-10 ENCOUNTER — OFFICE VISIT (OUTPATIENT)
Dept: FAMILY MEDICINE | Facility: CLINIC | Age: 68
End: 2023-01-10
Payer: MEDICARE

## 2023-01-10 VITALS
RESPIRATION RATE: 18 BRPM | SYSTOLIC BLOOD PRESSURE: 138 MMHG | WEIGHT: 233.38 LBS | HEIGHT: 67 IN | OXYGEN SATURATION: 96 % | HEART RATE: 60 BPM | DIASTOLIC BLOOD PRESSURE: 60 MMHG | TEMPERATURE: 98 F | BODY MASS INDEX: 36.63 KG/M2

## 2023-01-10 DIAGNOSIS — Z78.0 POSTMENOPAUSAL: ICD-10-CM

## 2023-01-10 DIAGNOSIS — E78.00 ELEVATED CHOLESTEROL: ICD-10-CM

## 2023-01-10 DIAGNOSIS — I25.118 CORONARY ARTERY DISEASE OF NATIVE ARTERY OF NATIVE HEART WITH STABLE ANGINA PECTORIS: ICD-10-CM

## 2023-01-10 DIAGNOSIS — M79.671 RIGHT FOOT PAIN: Primary | ICD-10-CM

## 2023-01-10 DIAGNOSIS — E11.9 TYPE 2 DIABETES MELLITUS WITHOUT COMPLICATION, WITHOUT LONG-TERM CURRENT USE OF INSULIN: ICD-10-CM

## 2023-01-10 LAB
ANION GAP SERPL CALCULATED.3IONS-SCNC: 8 MMOL/L (ref 7–16)
BUN SERPL-MCNC: 20 MG/DL (ref 7–18)
BUN/CREAT SERPL: 23 (ref 6–20)
CALCIUM SERPL-MCNC: 10.4 MG/DL (ref 8.5–10.1)
CHLORIDE SERPL-SCNC: 107 MMOL/L (ref 98–107)
CHOLEST SERPL-MCNC: 171 MG/DL (ref 0–200)
CHOLEST/HDLC SERPL: 2.4 {RATIO}
CO2 SERPL-SCNC: 32 MMOL/L (ref 21–32)
CREAT SERPL-MCNC: 0.88 MG/DL (ref 0.55–1.02)
CREAT UR-MCNC: 38 MG/DL (ref 28–219)
EGFR (NO RACE VARIABLE) (RUSH/TITUS): 73 ML/MIN/1.73M²
EST. AVERAGE GLUCOSE BLD GHB EST-MCNC: 127 MG/DL
GLUCOSE SERPL-MCNC: 143 MG/DL (ref 74–106)
HBA1C MFR BLD HPLC: 6.4 % (ref 4.5–6.6)
HDLC SERPL-MCNC: 71 MG/DL (ref 40–60)
LDLC SERPL CALC-MCNC: 83 MG/DL
LDLC/HDLC SERPL: 1.2 {RATIO}
MICROALBUMIN UR-MCNC: 0.6 MG/DL (ref 0–2.8)
MICROALBUMIN/CREAT RATIO PNL UR: 15.8 MG/G (ref 0–30)
NONHDLC SERPL-MCNC: 100 MG/DL
POTASSIUM SERPL-SCNC: 4.7 MMOL/L (ref 3.5–5.1)
PROT UR-MCNC: 5.2 MG/DL (ref 0–11.9)
SODIUM SERPL-SCNC: 142 MMOL/L (ref 136–145)
TRIGL SERPL-MCNC: 87 MG/DL (ref 35–150)
VLDLC SERPL-MCNC: 17 MG/DL

## 2023-01-10 PROCEDURE — 80061 LIPID PANEL: CPT | Mod: ,,, | Performed by: CLINICAL MEDICAL LABORATORY

## 2023-01-10 PROCEDURE — 80061 LIPID PANEL: ICD-10-PCS | Mod: ,,, | Performed by: CLINICAL MEDICAL LABORATORY

## 2023-01-10 PROCEDURE — 82043 MICROALBUMIN / CREATININE RATIO URINE: ICD-10-PCS | Mod: ,,, | Performed by: CLINICAL MEDICAL LABORATORY

## 2023-01-10 PROCEDURE — 1160F PR REVIEW ALL MEDS BY PRESCRIBER/CLIN PHARMACIST DOCUMENTED: ICD-10-PCS | Mod: ,,, | Performed by: INTERNAL MEDICINE

## 2023-01-10 PROCEDURE — 1101F PT FALLS ASSESS-DOCD LE1/YR: CPT | Mod: ,,, | Performed by: INTERNAL MEDICINE

## 2023-01-10 PROCEDURE — 3008F PR BODY MASS INDEX (BMI) DOCUMENTED: ICD-10-PCS | Mod: ,,, | Performed by: INTERNAL MEDICINE

## 2023-01-10 PROCEDURE — 84156 ASSAY OF PROTEIN URINE: CPT | Mod: ,,, | Performed by: CLINICAL MEDICAL LABORATORY

## 2023-01-10 PROCEDURE — 1159F MED LIST DOCD IN RCRD: CPT | Mod: ,,, | Performed by: INTERNAL MEDICINE

## 2023-01-10 PROCEDURE — 3078F DIAST BP <80 MM HG: CPT | Mod: ,,, | Performed by: INTERNAL MEDICINE

## 2023-01-10 PROCEDURE — 82570 MICROALBUMIN / CREATININE RATIO URINE: ICD-10-PCS | Mod: ,,, | Performed by: CLINICAL MEDICAL LABORATORY

## 2023-01-10 PROCEDURE — 1160F RVW MEDS BY RX/DR IN RCRD: CPT | Mod: ,,, | Performed by: INTERNAL MEDICINE

## 2023-01-10 PROCEDURE — 83036 HEMOGLOBIN A1C: ICD-10-PCS | Mod: ,,, | Performed by: CLINICAL MEDICAL LABORATORY

## 2023-01-10 PROCEDURE — 99214 PR OFFICE/OUTPT VISIT, EST, LEVL IV, 30-39 MIN: ICD-10-PCS | Mod: ,,, | Performed by: INTERNAL MEDICINE

## 2023-01-10 PROCEDURE — 80048 BASIC METABOLIC PNL TOTAL CA: CPT | Mod: ,,, | Performed by: CLINICAL MEDICAL LABORATORY

## 2023-01-10 PROCEDURE — 3075F SYST BP GE 130 - 139MM HG: CPT | Mod: ,,, | Performed by: INTERNAL MEDICINE

## 2023-01-10 PROCEDURE — 83036 HEMOGLOBIN GLYCOSYLATED A1C: CPT | Mod: ,,, | Performed by: CLINICAL MEDICAL LABORATORY

## 2023-01-10 PROCEDURE — 3078F PR MOST RECENT DIASTOLIC BLOOD PRESSURE < 80 MM HG: ICD-10-PCS | Mod: ,,, | Performed by: INTERNAL MEDICINE

## 2023-01-10 PROCEDURE — 3075F PR MOST RECENT SYSTOLIC BLOOD PRESS GE 130-139MM HG: ICD-10-PCS | Mod: ,,, | Performed by: INTERNAL MEDICINE

## 2023-01-10 PROCEDURE — 3044F PR MOST RECENT HEMOGLOBIN A1C LEVEL <7.0%: ICD-10-PCS | Mod: ,,, | Performed by: INTERNAL MEDICINE

## 2023-01-10 PROCEDURE — 84156 PROTEIN, QUANTITATIVE, URINE RANDOM: ICD-10-PCS | Mod: ,,, | Performed by: CLINICAL MEDICAL LABORATORY

## 2023-01-10 PROCEDURE — 80048 BASIC METABOLIC PANEL: ICD-10-PCS | Mod: ,,, | Performed by: CLINICAL MEDICAL LABORATORY

## 2023-01-10 PROCEDURE — 3288F PR FALLS RISK ASSESSMENT DOCUMENTED: ICD-10-PCS | Mod: ,,, | Performed by: INTERNAL MEDICINE

## 2023-01-10 PROCEDURE — 1126F PR PAIN SEVERITY QUANTIFIED, NO PAIN PRESENT: ICD-10-PCS | Mod: ,,, | Performed by: INTERNAL MEDICINE

## 2023-01-10 PROCEDURE — 1159F PR MEDICATION LIST DOCUMENTED IN MEDICAL RECORD: ICD-10-PCS | Mod: ,,, | Performed by: INTERNAL MEDICINE

## 2023-01-10 PROCEDURE — 99214 OFFICE O/P EST MOD 30 MIN: CPT | Mod: ,,, | Performed by: INTERNAL MEDICINE

## 2023-01-10 PROCEDURE — 3044F HG A1C LEVEL LT 7.0%: CPT | Mod: ,,, | Performed by: INTERNAL MEDICINE

## 2023-01-10 PROCEDURE — 3008F BODY MASS INDEX DOCD: CPT | Mod: ,,, | Performed by: INTERNAL MEDICINE

## 2023-01-10 PROCEDURE — 1101F PR PT FALLS ASSESS DOC 0-1 FALLS W/OUT INJ PAST YR: ICD-10-PCS | Mod: ,,, | Performed by: INTERNAL MEDICINE

## 2023-01-10 PROCEDURE — 82043 UR ALBUMIN QUANTITATIVE: CPT | Mod: ,,, | Performed by: CLINICAL MEDICAL LABORATORY

## 2023-01-10 PROCEDURE — 3288F FALL RISK ASSESSMENT DOCD: CPT | Mod: ,,, | Performed by: INTERNAL MEDICINE

## 2023-01-10 PROCEDURE — 82570 ASSAY OF URINE CREATININE: CPT | Mod: ,,, | Performed by: CLINICAL MEDICAL LABORATORY

## 2023-01-10 PROCEDURE — 1126F AMNT PAIN NOTED NONE PRSNT: CPT | Mod: ,,, | Performed by: INTERNAL MEDICINE

## 2023-01-10 RX ORDER — METFORMIN HYDROCHLORIDE 500 MG/1
500 TABLET ORAL 3 TIMES DAILY
Qty: 270 TABLET | Refills: 1 | Status: SHIPPED | OUTPATIENT
Start: 2023-01-10

## 2023-01-10 RX ORDER — GLIPIZIDE 10 MG/1
10 TABLET ORAL 2 TIMES DAILY
Qty: 180 TABLET | Refills: 1 | Status: SHIPPED | OUTPATIENT
Start: 2023-01-10 | End: 2023-05-01 | Stop reason: SDUPTHER

## 2023-01-10 NOTE — PROGRESS NOTES
Subjective:       Patient ID: Karie Kaufman is a 67 y.o. female.    Chief Complaint: Diabetes and Medication Refill    Patient is here today for a follow up evaluation. Patient blood pressure is stable today on intake, 138/60. Patient has a complaint of knot on the back of her right heel. Patient concerned about possible bone spur. Will order x-ray of right ankle. Will order x-ray of right foot. Will refer patient to Podiatry. Patient is requesting medication refills. Will refill today. Will order lab work and urine today. Will follow with patient in 2 months.       Health maintenance discussed and reviewed with patient. Patient states she is up to date on Pneumonia vaccine. Will order Dexa Scan today.     Current Medications:    Current Outpatient Medications:     acetaminophen (TYLENOL) 500 MG tablet, Take 500 mg by mouth every 6 (six) hours as needed for Pain., Disp: , Rfl:     aspirin (ECOTRIN) 81 MG EC tablet, Take 81 mg by mouth once daily., Disp: , Rfl:     atorvastatin (LIPITOR) 80 MG tablet, Take 80 mg by mouth once daily., Disp: , Rfl:     carvediloL (COREG) 6.25 MG tablet, Take 6.25 mg by mouth 2 (two) times daily., Disp: , Rfl:     clopidogreL (PLAVIX) 75 mg tablet, Take 75 mg by mouth once daily., Disp: , Rfl:     ezetimibe (ZETIA) 10 mg tablet, Take 10 mg by mouth once daily., Disp: , Rfl:     lisinopriL-hydrochlorothiazide (PRINZIDE,ZESTORETIC) 20-12.5 mg per tablet, Take 1 tablet by mouth 2 (two) times daily., Disp: , Rfl:     metFORMIN (GLUCOPHAGE) 500 MG tablet, Take 1 tablet (500 mg total) by mouth 3 (three) times daily., Disp: 90 tablet, Rfl: 0    miSOPROStoL (CYTOTEC) 200 MCG Tab, INSERT ONE TABLET VAGINALLY AT BEDTIME WEDNESDAY NIGHT, Disp: , Rfl:     multivit-min/folic acid/biotin (HAIR,SKIN AND NAILS,FA-BIOTIN, ORAL), Take by mouth., Disp: , Rfl:     triamterene-hydrochlorothiazide 37.5-25 mg (MAXZIDE-25) 37.5-25 mg per tablet, Take 1 tablet by mouth once daily., Disp: , Rfl:     vitamin D  (VITAMIN D3) 1000 units Tab, Take 1,000 Units by mouth once daily., Disp: , Rfl:     glipiZIDE (GLUCOTROL) 10 MG tablet, Take 1 tablet (10 mg total) by mouth 2 (two) times a day., Disp: 180 tablet, Rfl: 1    Last Labs:     No visits with results within 1 Month(s) from this visit.   Latest known visit with results is:   Office Visit on 10/05/2022   Component Date Value    Hepatitis C Ab 10/05/2022 Non-Reactive     HIV 1/2 10/05/2022 Non-Reactive        Last Imaging:  X-Ray Foot 2 View Right  Narrative: EXAMINATION:  XR ANKLE 2 VIEW RIGHT; XR FOOT 2 VIEW RIGHT    CLINICAL HISTORY:  Pain in right foot    COMPARISON:  None available    FINDINGS:  No evidence of fracture seen.  The alignment of the joints appears normal.  Mild ankle, midfoot and 1st metatarsophalangeal joint degenerative change is present.  No soft tissue abnormality is seen.  Impression: Mild osteoarthrosis.    Electronically signed by: Andrea Luna  Date:    01/10/2023  Time:    11:24  X-Ray Ankle 2 View Right  Narrative: EXAMINATION:  XR ANKLE 2 VIEW RIGHT; XR FOOT 2 VIEW RIGHT    CLINICAL HISTORY:  Pain in right foot    COMPARISON:  None available    FINDINGS:  No evidence of fracture seen.  The alignment of the joints appears normal.  Mild ankle, midfoot and 1st metatarsophalangeal joint degenerative change is present.  No soft tissue abnormality is seen.  Impression: Mild osteoarthrosis.    Electronically signed by: Andrea Luna  Date:    01/10/2023  Time:    11:24         Review of Systems   Musculoskeletal:         Foot pain   All other systems reviewed and are negative.      Objective:      Physical Exam  Vitals reviewed.   Constitutional:       Appearance: Normal appearance. She is normal weight.   Cardiovascular:      Rate and Rhythm: Normal rate and regular rhythm.      Pulses: Normal pulses.      Heart sounds: Normal heart sounds.   Pulmonary:      Effort: Pulmonary effort is normal.      Breath sounds: Normal breath sounds.    Abdominal:      General: Abdomen is flat. Bowel sounds are normal.      Palpations: Abdomen is soft.   Musculoskeletal:         General: Normal range of motion.      Cervical back: Normal range of motion and neck supple.   Skin:     General: Skin is warm and dry.   Neurological:      General: No focal deficit present.      Mental Status: She is alert and oriented to person, place, and time. Mental status is at baseline.       Assessment:       1. Right foot pain  X-Ray Ankle 2 View Right    X-Ray Foot 2 View Right    Ambulatory referral/consult to Podiatry      2. Type 2 diabetes mellitus without complication, without long-term current use of insulin  Hemoglobin A1C    Protein, Random Urine    Microalbumin/Creatinine Ratio, Urine    Hemoglobin A1C    Protein, Random Urine    Microalbumin/Creatinine Ratio, Urine      3. Coronary artery disease of native artery of native heart with stable angina pectoris  Basic Metabolic Panel    Lipid Panel    Basic Metabolic Panel    Lipid Panel      4. Postmenopausal  DXA Bone Density Spine And Hip      5. Elevated cholesterol             Plan:         Karie was seen today for diabetes and medication refill.    Diagnoses and all orders for this visit:    Right foot pain  -     X-Ray Ankle 2 View Right; Future  -     X-Ray Foot 2 View Right; Future  -     Ambulatory referral/consult to Podiatry; Future    Type 2 diabetes mellitus without complication, without long-term current use of insulin  -     Hemoglobin A1C; Future  -     Protein, Random Urine; Future  -     Microalbumin/Creatinine Ratio, Urine; Future  -     Hemoglobin A1C  -     Protein, Random Urine  -     Microalbumin/Creatinine Ratio, Urine    Coronary artery disease of native artery of native heart with stable angina pectoris  -     Basic Metabolic Panel; Future  -     Lipid Panel; Future  -     Basic Metabolic Panel  -     Lipid Panel    Postmenopausal  -     DXA Bone Density Spine And Hip; Future    Elevated  cholesterol    Other orders  The following orders have not been finalized:  -     glipiZIDE (GLUCOTROL) 10 MG tablet  -     metFORMIN (GLUCOPHAGE) 500 MG tablet

## 2023-01-10 NOTE — PATIENT INSTRUCTIONS
Karie was seen today for diabetes and medication refill.    Diagnoses and all orders for this visit:    Right foot pain  -     X-Ray Ankle 2 View Right; Future  -     X-Ray Foot 2 View Right; Future  -     Ambulatory referral/consult to Podiatry; Future    Type 2 diabetes mellitus without complication, without long-term current use of insulin  -     Hemoglobin A1C; Future  -     Protein, Random Urine; Future  -     Microalbumin/Creatinine Ratio, Urine; Future  -     Hemoglobin A1C  -     Protein, Random Urine  -     Microalbumin/Creatinine Ratio, Urine    Coronary artery disease of native artery of native heart with stable angina pectoris  -     Basic Metabolic Panel; Future  -     Lipid Panel; Future  -     Basic Metabolic Panel  -     Lipid Panel    Postmenopausal  -     DXA Bone Density Spine And Hip; Future    Elevated cholesterol    Other orders  The following orders have not been finalized:  -     glipiZIDE (GLUCOTROL) 10 MG tablet  -     metFORMIN (GLUCOPHAGE) 500 MG tablet

## 2023-01-12 ENCOUNTER — TELEPHONE (OUTPATIENT)
Dept: FAMILY MEDICINE | Facility: CLINIC | Age: 68
End: 2023-01-12
Payer: MEDICARE

## 2023-01-12 NOTE — TELEPHONE ENCOUNTER
----- Message from Edie Perez sent at 1/10/2023  2:17 PM CST -----  Pt needing results. States she cannot check her mychart, she doesn't know how. [phone]- call made to pt regarding above message.  Informed pt of abnormal Lipid Panel and BMP; pt thanked me for the results. No other questions asked at this time.

## 2023-01-31 ENCOUNTER — OFFICE VISIT (OUTPATIENT)
Dept: FAMILY MEDICINE | Facility: CLINIC | Age: 68
End: 2023-01-31
Payer: MEDICARE

## 2023-01-31 VITALS
TEMPERATURE: 98 F | HEART RATE: 60 BPM | BODY MASS INDEX: 37.07 KG/M2 | OXYGEN SATURATION: 97 % | DIASTOLIC BLOOD PRESSURE: 62 MMHG | WEIGHT: 236.19 LBS | SYSTOLIC BLOOD PRESSURE: 118 MMHG | HEIGHT: 67 IN | RESPIRATION RATE: 20 BRPM

## 2023-01-31 DIAGNOSIS — E66.9 OBESITY, UNSPECIFIED CLASSIFICATION, UNSPECIFIED OBESITY TYPE, UNSPECIFIED WHETHER SERIOUS COMORBIDITY PRESENT: Chronic | ICD-10-CM

## 2023-01-31 DIAGNOSIS — M19.90 OSTEOARTHRITIS, UNSPECIFIED OSTEOARTHRITIS TYPE, UNSPECIFIED SITE: Primary | Chronic | ICD-10-CM

## 2023-01-31 DIAGNOSIS — E11.9 TYPE 2 DIABETES MELLITUS WITHOUT COMPLICATION, WITHOUT LONG-TERM CURRENT USE OF INSULIN: Chronic | ICD-10-CM

## 2023-01-31 PROCEDURE — 1101F PT FALLS ASSESS-DOCD LE1/YR: CPT | Mod: ,,, | Performed by: INTERNAL MEDICINE

## 2023-01-31 PROCEDURE — 99214 PR OFFICE/OUTPT VISIT, EST, LEVL IV, 30-39 MIN: ICD-10-PCS | Mod: ,,, | Performed by: INTERNAL MEDICINE

## 2023-01-31 PROCEDURE — 3061F PR NEG MICROALBUMINURIA RESULT DOCUMENTED/REVIEW: ICD-10-PCS | Mod: ,,, | Performed by: INTERNAL MEDICINE

## 2023-01-31 PROCEDURE — 3008F BODY MASS INDEX DOCD: CPT | Mod: ,,, | Performed by: INTERNAL MEDICINE

## 2023-01-31 PROCEDURE — 1159F MED LIST DOCD IN RCRD: CPT | Mod: ,,, | Performed by: INTERNAL MEDICINE

## 2023-01-31 PROCEDURE — 3288F FALL RISK ASSESSMENT DOCD: CPT | Mod: ,,, | Performed by: INTERNAL MEDICINE

## 2023-01-31 PROCEDURE — 3066F PR DOCUMENTATION OF TREATMENT FOR NEPHROPATHY: ICD-10-PCS | Mod: ,,, | Performed by: INTERNAL MEDICINE

## 2023-01-31 PROCEDURE — 1160F RVW MEDS BY RX/DR IN RCRD: CPT | Mod: ,,, | Performed by: INTERNAL MEDICINE

## 2023-01-31 PROCEDURE — 3066F NEPHROPATHY DOC TX: CPT | Mod: ,,, | Performed by: INTERNAL MEDICINE

## 2023-01-31 PROCEDURE — 3288F PR FALLS RISK ASSESSMENT DOCUMENTED: ICD-10-PCS | Mod: ,,, | Performed by: INTERNAL MEDICINE

## 2023-01-31 PROCEDURE — 1126F PR PAIN SEVERITY QUANTIFIED, NO PAIN PRESENT: ICD-10-PCS | Mod: ,,, | Performed by: INTERNAL MEDICINE

## 2023-01-31 PROCEDURE — 3074F PR MOST RECENT SYSTOLIC BLOOD PRESSURE < 130 MM HG: ICD-10-PCS | Mod: ,,, | Performed by: INTERNAL MEDICINE

## 2023-01-31 PROCEDURE — 3078F DIAST BP <80 MM HG: CPT | Mod: ,,, | Performed by: INTERNAL MEDICINE

## 2023-01-31 PROCEDURE — 1101F PR PT FALLS ASSESS DOC 0-1 FALLS W/OUT INJ PAST YR: ICD-10-PCS | Mod: ,,, | Performed by: INTERNAL MEDICINE

## 2023-01-31 PROCEDURE — 3044F HG A1C LEVEL LT 7.0%: CPT | Mod: ,,, | Performed by: INTERNAL MEDICINE

## 2023-01-31 PROCEDURE — 3044F PR MOST RECENT HEMOGLOBIN A1C LEVEL <7.0%: ICD-10-PCS | Mod: ,,, | Performed by: INTERNAL MEDICINE

## 2023-01-31 PROCEDURE — 1159F PR MEDICATION LIST DOCUMENTED IN MEDICAL RECORD: ICD-10-PCS | Mod: ,,, | Performed by: INTERNAL MEDICINE

## 2023-01-31 PROCEDURE — 3061F NEG MICROALBUMINURIA REV: CPT | Mod: ,,, | Performed by: INTERNAL MEDICINE

## 2023-01-31 PROCEDURE — 3074F SYST BP LT 130 MM HG: CPT | Mod: ,,, | Performed by: INTERNAL MEDICINE

## 2023-01-31 PROCEDURE — 3078F PR MOST RECENT DIASTOLIC BLOOD PRESSURE < 80 MM HG: ICD-10-PCS | Mod: ,,, | Performed by: INTERNAL MEDICINE

## 2023-01-31 PROCEDURE — 1160F PR REVIEW ALL MEDS BY PRESCRIBER/CLIN PHARMACIST DOCUMENTED: ICD-10-PCS | Mod: ,,, | Performed by: INTERNAL MEDICINE

## 2023-01-31 PROCEDURE — 3008F PR BODY MASS INDEX (BMI) DOCUMENTED: ICD-10-PCS | Mod: ,,, | Performed by: INTERNAL MEDICINE

## 2023-01-31 PROCEDURE — 1126F AMNT PAIN NOTED NONE PRSNT: CPT | Mod: ,,, | Performed by: INTERNAL MEDICINE

## 2023-01-31 PROCEDURE — 99214 OFFICE O/P EST MOD 30 MIN: CPT | Mod: ,,, | Performed by: INTERNAL MEDICINE

## 2023-01-31 RX ORDER — NAPROXEN 500 MG/1
500 TABLET ORAL 2 TIMES DAILY PRN
Qty: 20 TABLET | Refills: 2 | Status: CANCELLED | OUTPATIENT
Start: 2023-01-31

## 2023-01-31 NOTE — PROGRESS NOTES
Subjective:       Patient ID: Karie Kaufman is a 67 y.o. female.    Chief Complaint: Results (Abnl xray )    HPI  .  Patient complains of pain in bilateral ankles.  Also she has pain in her feet.  Patient also was found to be morbidly obese today.  Otherwise she has no complaints she is awake alert she denies chest pain she denies shortness or breath.  Current Medications:    Current Outpatient Medications:     acetaminophen (TYLENOL) 500 MG tablet, Take 500 mg by mouth every 6 (six) hours as needed for Pain., Disp: , Rfl:     aspirin (ECOTRIN) 81 MG EC tablet, Take 81 mg by mouth once daily., Disp: , Rfl:     atorvastatin (LIPITOR) 80 MG tablet, Take 80 mg by mouth once daily., Disp: , Rfl:     carvediloL (COREG) 6.25 MG tablet, Take 6.25 mg by mouth 2 (two) times daily., Disp: , Rfl:     clopidogreL (PLAVIX) 75 mg tablet, Take 75 mg by mouth once daily., Disp: , Rfl:     ezetimibe (ZETIA) 10 mg tablet, Take 10 mg by mouth once daily., Disp: , Rfl:     glipiZIDE (GLUCOTROL) 10 MG tablet, Take 1 tablet (10 mg total) by mouth 2 (two) times a day., Disp: 180 tablet, Rfl: 1    lisinopriL-hydrochlorothiazide (PRINZIDE,ZESTORETIC) 20-12.5 mg per tablet, Take 1 tablet by mouth 2 (two) times daily., Disp: , Rfl:     metFORMIN (GLUCOPHAGE) 500 MG tablet, Take 1 tablet (500 mg total) by mouth 3 (three) times daily., Disp: 270 tablet, Rfl: 1    miSOPROStoL (CYTOTEC) 200 MCG Tab, INSERT ONE TABLET VAGINALLY AT BEDTIME WEDNESDAY NIGHT, Disp: , Rfl:     multivit-min/folic acid/biotin (HAIR,SKIN AND NAILS,FA-BIOTIN, ORAL), Take by mouth., Disp: , Rfl:     triamterene-hydrochlorothiazide 37.5-25 mg (MAXZIDE-25) 37.5-25 mg per tablet, Take 1 tablet by mouth once daily., Disp: , Rfl:     vitamin D (VITAMIN D3) 1000 units Tab, Take 1,000 Units by mouth once daily., Disp: , Rfl:            Review of Systems   Constitutional:  Negative for appetite change, fatigue and fever.   Respiratory:  Negative for shortness of breath.   "  Cardiovascular:  Negative for chest pain.   Gastrointestinal:  Negative for abdominal pain and constipation.   Endocrine: Negative for polydipsia, polyphagia and polyuria.   Genitourinary:  Negative for difficulty urinating, frequency and hot flashes.   Allergic/Immunologic: Negative for environmental allergies.   Neurological:  Negative for dizziness and light-headedness.   Psychiatric/Behavioral:  Negative for agitation.               Vitals:    01/31/23 1532   BP: 118/62   BP Location: Right arm   Pulse: 60   Resp: 20   Temp: 97.6 °F (36.4 °C)   SpO2: 97%   Weight: 107.1 kg (236 lb 3.2 oz)   Height: 5' 7" (1.702 m)        Physical Exam  Vitals and nursing note reviewed.   Constitutional:       Appearance: Normal appearance.   Cardiovascular:      Rate and Rhythm: Normal rate and regular rhythm.      Pulses: Normal pulses.      Heart sounds: Normal heart sounds.   Pulmonary:      Effort: Pulmonary effort is normal.      Breath sounds: Normal breath sounds.   Abdominal:      General: Abdomen is flat. Bowel sounds are normal.      Palpations: Abdomen is soft.   Musculoskeletal:         General: Normal range of motion.   Skin:     General: Skin is warm and dry.   Neurological:      General: No focal deficit present.      Mental Status: She is alert and oriented to person, place, and time. Mental status is at baseline.         Last Labs:     Office Visit on 01/10/2023   Component Date Value    Sodium 01/10/2023 142     Potassium 01/10/2023 4.7     Chloride 01/10/2023 107     CO2 01/10/2023 32     Anion Gap 01/10/2023 8     Glucose 01/10/2023 143 (H)     BUN 01/10/2023 20 (H)     Creatinine 01/10/2023 0.88     BUN/Creatinine Ratio 01/10/2023 23 (H)     Calcium 01/10/2023 10.4 (H)     eGFR 01/10/2023 73     Hemoglobin A1C 01/10/2023 6.4     Estimated Average Glucose 01/10/2023 127     Triglycerides 01/10/2023 87     Cholesterol 01/10/2023 171     HDL Cholesterol 01/10/2023 71 (H)     Cholesterol/HDL Ratio (R* " 01/10/2023 2.4     Non-HDL 01/10/2023 100     LDL Calculated 01/10/2023 83     LDL/HDL 01/10/2023 1.2     VLDL 01/10/2023 17     Protein, Urine 01/10/2023 5.2     Creatinine, Urine 01/10/2023 38     Microalbumin 01/10/2023 0.6     Microalbumin/Creatinine * 01/10/2023 15.8        Last Imaging:  X-Ray Foot 2 View Right  Narrative: EXAMINATION:  XR ANKLE 2 VIEW RIGHT; XR FOOT 2 VIEW RIGHT    CLINICAL HISTORY:  Pain in right foot    COMPARISON:  None available    FINDINGS:  No evidence of fracture seen.  The alignment of the joints appears normal.  Mild ankle, midfoot and 1st metatarsophalangeal joint degenerative change is present.  No soft tissue abnormality is seen.  Impression: Mild osteoarthrosis.    Electronically signed by: Andrea Luna  Date:    01/10/2023  Time:    11:24  X-Ray Ankle 2 View Right  Narrative: EXAMINATION:  XR ANKLE 2 VIEW RIGHT; XR FOOT 2 VIEW RIGHT    CLINICAL HISTORY:  Pain in right foot    COMPARISON:  None available    FINDINGS:  No evidence of fracture seen.  The alignment of the joints appears normal.  Mild ankle, midfoot and 1st metatarsophalangeal joint degenerative change is present.  No soft tissue abnormality is seen.  Impression: Mild osteoarthrosis.    Electronically signed by: Andrea Luna  Date:    01/10/2023  Time:    11:24             Objective:        Assessment:       1. Osteoarthritis, unspecified osteoarthritis type, unspecified site      She has osteoarthritis and bilateral ankles.  Naprosyn 500 mg 1 p.o. b.i.d..      2. Type 2 diabetes mellitus without complication, without long-term current use of insulin      Will continue current diabetic medications  Next visit check A1c, urine, BMP, lipids      3. Obesity, unspecified classification, unspecified obesity type, unspecified whether serious comorbidity present      Recommend diet and exercise.           Plan:         Karie was seen today for results.    Diagnoses and all orders for this visit:    Osteoarthritis,  unspecified osteoarthritis type, unspecified site  Comments:  She has osteoarthritis and bilateral ankles.  Naprosyn 500 mg 1 p.o. b.i.d..    Type 2 diabetes mellitus without complication, without long-term current use of insulin  Comments:  Will continue current diabetic medications  Next visit check A1c, urine, BMP, lipids    Obesity, unspecified classification, unspecified obesity type, unspecified whether serious comorbidity present  Comments:  Recommend diet and exercise.

## 2023-02-09 DIAGNOSIS — Z71.89 COMPLEX CARE COORDINATION: ICD-10-CM

## 2023-03-09 ENCOUNTER — OFFICE VISIT (OUTPATIENT)
Dept: FAMILY MEDICINE | Facility: CLINIC | Age: 68
End: 2023-03-09
Payer: MEDICARE

## 2023-03-09 VITALS
WEIGHT: 235.81 LBS | RESPIRATION RATE: 18 BRPM | HEART RATE: 62 BPM | DIASTOLIC BLOOD PRESSURE: 70 MMHG | HEIGHT: 67 IN | SYSTOLIC BLOOD PRESSURE: 133 MMHG | OXYGEN SATURATION: 99 % | TEMPERATURE: 98 F | BODY MASS INDEX: 37.01 KG/M2

## 2023-03-09 DIAGNOSIS — E66.01 CLASS 3 SEVERE OBESITY DUE TO EXCESS CALORIES WITHOUT SERIOUS COMORBIDITY IN ADULT, UNSPECIFIED BMI: Chronic | ICD-10-CM

## 2023-03-09 DIAGNOSIS — M17.11 PRIMARY OSTEOARTHRITIS OF RIGHT KNEE: Primary | Chronic | ICD-10-CM

## 2023-03-09 PROBLEM — E66.813 CLASS 3 SEVERE OBESITY DUE TO EXCESS CALORIES WITHOUT SERIOUS COMORBIDITY IN ADULT: Status: ACTIVE | Noted: 2023-01-31

## 2023-03-09 PROCEDURE — 3075F PR MOST RECENT SYSTOLIC BLOOD PRESS GE 130-139MM HG: ICD-10-PCS | Mod: ,,, | Performed by: INTERNAL MEDICINE

## 2023-03-09 PROCEDURE — 1159F PR MEDICATION LIST DOCUMENTED IN MEDICAL RECORD: ICD-10-PCS | Mod: ,,, | Performed by: INTERNAL MEDICINE

## 2023-03-09 PROCEDURE — 99214 PR OFFICE/OUTPT VISIT, EST, LEVL IV, 30-39 MIN: ICD-10-PCS | Mod: ,,, | Performed by: INTERNAL MEDICINE

## 2023-03-09 PROCEDURE — 1126F PR PAIN SEVERITY QUANTIFIED, NO PAIN PRESENT: ICD-10-PCS | Mod: ,,, | Performed by: INTERNAL MEDICINE

## 2023-03-09 PROCEDURE — 1101F PT FALLS ASSESS-DOCD LE1/YR: CPT | Mod: ,,, | Performed by: INTERNAL MEDICINE

## 2023-03-09 PROCEDURE — 1101F PR PT FALLS ASSESS DOC 0-1 FALLS W/OUT INJ PAST YR: ICD-10-PCS | Mod: ,,, | Performed by: INTERNAL MEDICINE

## 2023-03-09 PROCEDURE — 3066F NEPHROPATHY DOC TX: CPT | Mod: ,,, | Performed by: INTERNAL MEDICINE

## 2023-03-09 PROCEDURE — 3078F DIAST BP <80 MM HG: CPT | Mod: ,,, | Performed by: INTERNAL MEDICINE

## 2023-03-09 PROCEDURE — 3008F BODY MASS INDEX DOCD: CPT | Mod: ,,, | Performed by: INTERNAL MEDICINE

## 2023-03-09 PROCEDURE — 3075F SYST BP GE 130 - 139MM HG: CPT | Mod: ,,, | Performed by: INTERNAL MEDICINE

## 2023-03-09 PROCEDURE — 3078F PR MOST RECENT DIASTOLIC BLOOD PRESSURE < 80 MM HG: ICD-10-PCS | Mod: ,,, | Performed by: INTERNAL MEDICINE

## 2023-03-09 PROCEDURE — 99214 OFFICE O/P EST MOD 30 MIN: CPT | Mod: ,,, | Performed by: INTERNAL MEDICINE

## 2023-03-09 PROCEDURE — 3008F PR BODY MASS INDEX (BMI) DOCUMENTED: ICD-10-PCS | Mod: ,,, | Performed by: INTERNAL MEDICINE

## 2023-03-09 PROCEDURE — 1160F RVW MEDS BY RX/DR IN RCRD: CPT | Mod: ,,, | Performed by: INTERNAL MEDICINE

## 2023-03-09 PROCEDURE — 1159F MED LIST DOCD IN RCRD: CPT | Mod: ,,, | Performed by: INTERNAL MEDICINE

## 2023-03-09 PROCEDURE — 3044F HG A1C LEVEL LT 7.0%: CPT | Mod: ,,, | Performed by: INTERNAL MEDICINE

## 2023-03-09 PROCEDURE — 1126F AMNT PAIN NOTED NONE PRSNT: CPT | Mod: ,,, | Performed by: INTERNAL MEDICINE

## 2023-03-09 PROCEDURE — 3061F PR NEG MICROALBUMINURIA RESULT DOCUMENTED/REVIEW: ICD-10-PCS | Mod: ,,, | Performed by: INTERNAL MEDICINE

## 2023-03-09 PROCEDURE — 3066F PR DOCUMENTATION OF TREATMENT FOR NEPHROPATHY: ICD-10-PCS | Mod: ,,, | Performed by: INTERNAL MEDICINE

## 2023-03-09 PROCEDURE — 3288F FALL RISK ASSESSMENT DOCD: CPT | Mod: ,,, | Performed by: INTERNAL MEDICINE

## 2023-03-09 PROCEDURE — 3288F PR FALLS RISK ASSESSMENT DOCUMENTED: ICD-10-PCS | Mod: ,,, | Performed by: INTERNAL MEDICINE

## 2023-03-09 PROCEDURE — 4010F PR ACE/ARB THEARPY RXD/TAKEN: ICD-10-PCS | Mod: ,,, | Performed by: INTERNAL MEDICINE

## 2023-03-09 PROCEDURE — 4010F ACE/ARB THERAPY RXD/TAKEN: CPT | Mod: ,,, | Performed by: INTERNAL MEDICINE

## 2023-03-09 PROCEDURE — 3061F NEG MICROALBUMINURIA REV: CPT | Mod: ,,, | Performed by: INTERNAL MEDICINE

## 2023-03-09 PROCEDURE — 1160F PR REVIEW ALL MEDS BY PRESCRIBER/CLIN PHARMACIST DOCUMENTED: ICD-10-PCS | Mod: ,,, | Performed by: INTERNAL MEDICINE

## 2023-03-09 PROCEDURE — 3044F PR MOST RECENT HEMOGLOBIN A1C LEVEL <7.0%: ICD-10-PCS | Mod: ,,, | Performed by: INTERNAL MEDICINE

## 2023-03-09 RX ORDER — MELOXICAM 15 MG/1
15 TABLET ORAL DAILY
Qty: 30 TABLET | Refills: 2 | Status: SHIPPED | OUTPATIENT
Start: 2023-03-09 | End: 2023-04-20 | Stop reason: SDUPTHER

## 2023-03-09 NOTE — PROGRESS NOTES
Subjective:       Patient ID: Karie Kaufman is a 67 y.o. female.    Chief Complaint: Follow-up (2 months/Knee pain )    HPI  .  Patient seen and evaluated today.  Patient is awake alert patient is in no acute distress.  Patient complains of moderate pain in her right knee today.    Current Medications:    Current Outpatient Medications:     acetaminophen (TYLENOL) 500 MG tablet, Take 500 mg by mouth every 6 (six) hours as needed for Pain., Disp: , Rfl:     aspirin (ECOTRIN) 81 MG EC tablet, Take 81 mg by mouth once daily., Disp: , Rfl:     atorvastatin (LIPITOR) 80 MG tablet, Take 80 mg by mouth once daily., Disp: , Rfl:     carvediloL (COREG) 6.25 MG tablet, Take 6.25 mg by mouth 2 (two) times daily., Disp: , Rfl:     clopidogreL (PLAVIX) 75 mg tablet, Take 75 mg by mouth once daily., Disp: , Rfl:     ezetimibe (ZETIA) 10 mg tablet, Take 10 mg by mouth once daily., Disp: , Rfl:     glipiZIDE (GLUCOTROL) 10 MG tablet, Take 1 tablet (10 mg total) by mouth 2 (two) times a day., Disp: 180 tablet, Rfl: 1    lisinopriL-hydrochlorothiazide (PRINZIDE,ZESTORETIC) 20-12.5 mg per tablet, Take 1 tablet by mouth 2 (two) times daily., Disp: , Rfl:     metFORMIN (GLUCOPHAGE) 500 MG tablet, Take 1 tablet (500 mg total) by mouth 3 (three) times daily., Disp: 270 tablet, Rfl: 1    miSOPROStoL (CYTOTEC) 200 MCG Tab, INSERT ONE TABLET VAGINALLY AT BEDTIME WEDNESDAY NIGHT, Disp: , Rfl:     multivit-min/folic acid/biotin (HAIR,SKIN AND NAILS,FA-BIOTIN, ORAL), Take by mouth., Disp: , Rfl:     triamterene-hydrochlorothiazide 37.5-25 mg (MAXZIDE-25) 37.5-25 mg per tablet, Take 1 tablet by mouth once daily., Disp: , Rfl:     vitamin D (VITAMIN D3) 1000 units Tab, Take 1,000 Units by mouth once daily., Disp: , Rfl:            Review of Systems   Constitutional:  Negative for appetite change, fatigue and fever.   Respiratory:  Negative for shortness of breath.    Cardiovascular:  Negative for chest pain.   Gastrointestinal:  Negative for  "abdominal pain and constipation.   Endocrine: Negative for polydipsia, polyphagia and polyuria.   Genitourinary:  Negative for difficulty urinating, frequency and hot flashes.   Musculoskeletal:  Positive for arthralgias.   Allergic/Immunologic: Negative for environmental allergies.   Neurological:  Negative for dizziness and light-headedness.   Psychiatric/Behavioral:  Negative for agitation.               Vitals:    03/09/23 0941   BP: 133/70   BP Location: Right arm   Patient Position: Sitting   BP Method: Large (Automatic)   Pulse: 62   Resp: 18   Temp: 97.6 °F (36.4 °C)   TempSrc: Temporal   SpO2: 99%   Weight: 107 kg (235 lb 12.8 oz)   Height: 5' 7" (1.702 m)        Physical Exam  Vitals and nursing note reviewed.   Constitutional:       Appearance: Normal appearance.   Cardiovascular:      Rate and Rhythm: Normal rate and regular rhythm.      Pulses: Normal pulses.      Heart sounds: Normal heart sounds.   Pulmonary:      Effort: Pulmonary effort is normal.      Breath sounds: Normal breath sounds.   Abdominal:      General: Abdomen is flat. Bowel sounds are normal.      Palpations: Abdomen is soft.   Musculoskeletal:         General: Normal range of motion.   Skin:     General: Skin is warm and dry.   Neurological:      General: No focal deficit present.      Mental Status: She is alert and oriented to person, place, and time. Mental status is at baseline.         Last Labs:     No visits with results within 1 Month(s) from this visit.   Latest known visit with results is:   Office Visit on 01/10/2023   Component Date Value    Sodium 01/10/2023 142     Potassium 01/10/2023 4.7     Chloride 01/10/2023 107     CO2 01/10/2023 32     Anion Gap 01/10/2023 8     Glucose 01/10/2023 143 (H)     BUN 01/10/2023 20 (H)     Creatinine 01/10/2023 0.88     BUN/Creatinine Ratio 01/10/2023 23 (H)     Calcium 01/10/2023 10.4 (H)     eGFR 01/10/2023 73     Hemoglobin A1C 01/10/2023 6.4     Estimated Average Glucose 01/10/2023 " 127     Triglycerides 01/10/2023 87     Cholesterol 01/10/2023 171     HDL Cholesterol 01/10/2023 71 (H)     Cholesterol/HDL Ratio (R* 01/10/2023 2.4     Non-HDL 01/10/2023 100     LDL Calculated 01/10/2023 83     LDL/HDL 01/10/2023 1.2     VLDL 01/10/2023 17     Protein, Urine 01/10/2023 5.2     Creatinine, Urine 01/10/2023 38     Microalbumin 01/10/2023 0.6     Microalbumin/Creatinine * 01/10/2023 15.8        Last Imaging:  X-Ray Foot 2 View Right  Narrative: EXAMINATION:  XR ANKLE 2 VIEW RIGHT; XR FOOT 2 VIEW RIGHT    CLINICAL HISTORY:  Pain in right foot    COMPARISON:  None available    FINDINGS:  No evidence of fracture seen.  The alignment of the joints appears normal.  Mild ankle, midfoot and 1st metatarsophalangeal joint degenerative change is present.  No soft tissue abnormality is seen.  Impression: Mild osteoarthrosis.    Electronically signed by: Andrea Luna  Date:    01/10/2023  Time:    11:24  X-Ray Ankle 2 View Right  Narrative: EXAMINATION:  XR ANKLE 2 VIEW RIGHT; XR FOOT 2 VIEW RIGHT    CLINICAL HISTORY:  Pain in right foot    COMPARISON:  None available    FINDINGS:  No evidence of fracture seen.  The alignment of the joints appears normal.  Mild ankle, midfoot and 1st metatarsophalangeal joint degenerative change is present.  No soft tissue abnormality is seen.  Impression: Mild osteoarthrosis.    Electronically signed by: Andrea Luna  Date:    01/10/2023  Time:    11:24         **Labs and x-rays personally reviewed by me    ** reviewed      Objective:        Assessment:       1. Primary osteoarthritis of right knee      Mobic 15 mg 1 p.o. q.day.      2. Class 3 severe obesity due to excess calories without serious comorbidity in adult, unspecified BMI      Recommend diet, exercise, and lifestyle modification           Plan:         [unfilled]

## 2023-03-19 ENCOUNTER — OFFICE VISIT (OUTPATIENT)
Dept: FAMILY MEDICINE | Facility: CLINIC | Age: 68
End: 2023-03-19
Payer: MEDICARE

## 2023-03-19 VITALS
BODY MASS INDEX: 38.96 KG/M2 | HEART RATE: 55 BPM | WEIGHT: 248.19 LBS | HEIGHT: 67 IN | TEMPERATURE: 98 F | RESPIRATION RATE: 20 BRPM | SYSTOLIC BLOOD PRESSURE: 109 MMHG | OXYGEN SATURATION: 99 % | DIASTOLIC BLOOD PRESSURE: 63 MMHG

## 2023-03-19 DIAGNOSIS — J40 BRONCHITIS: Primary | ICD-10-CM

## 2023-03-19 PROCEDURE — 4010F PR ACE/ARB THEARPY RXD/TAKEN: ICD-10-PCS | Mod: ,,, | Performed by: FAMILY MEDICINE

## 2023-03-19 PROCEDURE — 3044F PR MOST RECENT HEMOGLOBIN A1C LEVEL <7.0%: ICD-10-PCS | Mod: ,,, | Performed by: FAMILY MEDICINE

## 2023-03-19 PROCEDURE — 3008F PR BODY MASS INDEX (BMI) DOCUMENTED: ICD-10-PCS | Mod: ,,, | Performed by: FAMILY MEDICINE

## 2023-03-19 PROCEDURE — 1160F PR REVIEW ALL MEDS BY PRESCRIBER/CLIN PHARMACIST DOCUMENTED: ICD-10-PCS | Mod: ,,, | Performed by: FAMILY MEDICINE

## 2023-03-19 PROCEDURE — 3078F DIAST BP <80 MM HG: CPT | Mod: ,,, | Performed by: FAMILY MEDICINE

## 2023-03-19 PROCEDURE — 3061F PR NEG MICROALBUMINURIA RESULT DOCUMENTED/REVIEW: ICD-10-PCS | Mod: ,,, | Performed by: FAMILY MEDICINE

## 2023-03-19 PROCEDURE — 4010F ACE/ARB THERAPY RXD/TAKEN: CPT | Mod: ,,, | Performed by: FAMILY MEDICINE

## 2023-03-19 PROCEDURE — 3074F PR MOST RECENT SYSTOLIC BLOOD PRESSURE < 130 MM HG: ICD-10-PCS | Mod: ,,, | Performed by: FAMILY MEDICINE

## 2023-03-19 PROCEDURE — 3066F NEPHROPATHY DOC TX: CPT | Mod: ,,, | Performed by: FAMILY MEDICINE

## 2023-03-19 PROCEDURE — 3066F PR DOCUMENTATION OF TREATMENT FOR NEPHROPATHY: ICD-10-PCS | Mod: ,,, | Performed by: FAMILY MEDICINE

## 2023-03-19 PROCEDURE — 3074F SYST BP LT 130 MM HG: CPT | Mod: ,,, | Performed by: FAMILY MEDICINE

## 2023-03-19 PROCEDURE — 96372 THER/PROPH/DIAG INJ SC/IM: CPT | Mod: ,,, | Performed by: FAMILY MEDICINE

## 2023-03-19 PROCEDURE — 1160F RVW MEDS BY RX/DR IN RCRD: CPT | Mod: ,,, | Performed by: FAMILY MEDICINE

## 2023-03-19 PROCEDURE — 3008F BODY MASS INDEX DOCD: CPT | Mod: ,,, | Performed by: FAMILY MEDICINE

## 2023-03-19 PROCEDURE — 3044F HG A1C LEVEL LT 7.0%: CPT | Mod: ,,, | Performed by: FAMILY MEDICINE

## 2023-03-19 PROCEDURE — 99213 PR OFFICE/OUTPT VISIT, EST, LEVL III, 20-29 MIN: ICD-10-PCS | Mod: 25,,, | Performed by: FAMILY MEDICINE

## 2023-03-19 PROCEDURE — 3061F NEG MICROALBUMINURIA REV: CPT | Mod: ,,, | Performed by: FAMILY MEDICINE

## 2023-03-19 PROCEDURE — 1159F PR MEDICATION LIST DOCUMENTED IN MEDICAL RECORD: ICD-10-PCS | Mod: ,,, | Performed by: FAMILY MEDICINE

## 2023-03-19 PROCEDURE — 96372 PR INJECTION,THERAP/PROPH/DIAG2ST, IM OR SUBCUT: ICD-10-PCS | Mod: ,,, | Performed by: FAMILY MEDICINE

## 2023-03-19 PROCEDURE — 99213 OFFICE O/P EST LOW 20 MIN: CPT | Mod: 25,,, | Performed by: FAMILY MEDICINE

## 2023-03-19 PROCEDURE — 1159F MED LIST DOCD IN RCRD: CPT | Mod: ,,, | Performed by: FAMILY MEDICINE

## 2023-03-19 PROCEDURE — 3078F PR MOST RECENT DIASTOLIC BLOOD PRESSURE < 80 MM HG: ICD-10-PCS | Mod: ,,, | Performed by: FAMILY MEDICINE

## 2023-03-19 RX ORDER — CEFTRIAXONE 1 G/1
1 INJECTION, POWDER, FOR SOLUTION INTRAMUSCULAR; INTRAVENOUS
Status: COMPLETED | OUTPATIENT
Start: 2023-03-19 | End: 2023-03-19

## 2023-03-19 RX ORDER — PROMETHAZINE HYDROCHLORIDE 6.25 MG/5ML
25 SYRUP ORAL EVERY 6 HOURS PRN
Qty: 200 ML | Refills: 0 | Status: SHIPPED | OUTPATIENT
Start: 2023-03-19

## 2023-03-19 RX ORDER — AZITHROMYCIN 250 MG/1
TABLET, FILM COATED ORAL
Qty: 6 TABLET | Refills: 0 | Status: SHIPPED | OUTPATIENT
Start: 2023-03-19

## 2023-03-19 RX ORDER — DEXAMETHASONE SODIUM PHOSPHATE 4 MG/ML
4 INJECTION, SOLUTION INTRA-ARTICULAR; INTRALESIONAL; INTRAMUSCULAR; INTRAVENOUS; SOFT TISSUE
Status: COMPLETED | OUTPATIENT
Start: 2023-03-19 | End: 2023-03-19

## 2023-03-19 RX ORDER — PREDNISONE 20 MG/1
20 TABLET ORAL DAILY
Qty: 5 TABLET | Refills: 0 | Status: SHIPPED | OUTPATIENT
Start: 2023-03-19 | End: 2023-03-24

## 2023-03-19 RX ADMIN — CEFTRIAXONE 1 G: 1 INJECTION, POWDER, FOR SOLUTION INTRAMUSCULAR; INTRAVENOUS at 04:03

## 2023-03-19 RX ADMIN — DEXAMETHASONE SODIUM PHOSPHATE 4 MG: 4 INJECTION, SOLUTION INTRA-ARTICULAR; INTRALESIONAL; INTRAMUSCULAR; INTRAVENOUS; SOFT TISSUE at 04:03

## 2023-03-19 NOTE — PROGRESS NOTES
Subjective:       Patient ID: Karie Kaufman is a 67 y.o. female.    Chief Complaint: Cough and Sinus Problem (Patienrt  request a shot for the cough and congestion.)    HPI  Review of Systems   Constitutional:  Negative for activity change, appetite change, chills, diaphoresis, fatigue, fever and unexpected weight change.   HENT:  Positive for nasal congestion, postnasal drip, rhinorrhea and sinus pressure/congestion. Negative for dental problem, drooling, ear discharge, ear pain, facial swelling, hearing loss, mouth sores, nosebleeds, sneezing, sore throat, tinnitus, trouble swallowing, voice change and goiter.    Eyes:  Negative for photophobia, discharge, itching and visual disturbance.   Respiratory:  Positive for cough. Negative for apnea, choking, chest tightness, shortness of breath, wheezing and stridor.    Cardiovascular:  Negative for chest pain, palpitations, leg swelling and claudication.   Gastrointestinal:  Negative for abdominal distention, abdominal pain, anal bleeding, blood in stool, change in bowel habit, constipation, diarrhea, nausea, vomiting and change in bowel habit.   Endocrine: Negative for cold intolerance, heat intolerance, polydipsia, polyphagia and polyuria.   Genitourinary:  Negative for bladder incontinence, decreased urine volume, difficulty urinating, dysuria, enuresis, flank pain, frequency, hematuria, nocturia, pelvic pain and urgency.   Musculoskeletal:  Negative for arthralgias, back pain, gait problem, joint swelling, leg pain, myalgias, neck pain, neck stiffness and joint deformity.   Integumentary:  Negative for pallor, rash, wound, breast mass and breast tenderness.   Allergic/Immunologic: Negative for environmental allergies, food allergies and immunocompromised state.   Neurological:  Negative for dizziness, vertigo, tremors, seizures, syncope, facial asymmetry, speech difficulty, weakness, light-headedness, numbness, headaches, coordination difficulties, memory loss and  coordination difficulties.   Hematological:  Negative for adenopathy. Does not bruise/bleed easily.   Psychiatric/Behavioral:  Negative for agitation, behavioral problems, confusion, decreased concentration, dysphoric mood, hallucinations, self-injury, sleep disturbance and suicidal ideas. The patient is not nervous/anxious and is not hyperactive.    Breast: Negative for mass and tenderness      Objective:      Physical Exam  Vitals reviewed.   Constitutional:       Appearance: Normal appearance.   HENT:      Head: Normocephalic and atraumatic.      Right Ear: Tympanic membrane, ear canal and external ear normal.      Left Ear: Tympanic membrane, ear canal and external ear normal.      Nose: Congestion and rhinorrhea present.      Mouth/Throat:      Mouth: Mucous membranes are moist.      Pharynx: Oropharynx is clear. Posterior oropharyngeal erythema present.   Eyes:      Extraocular Movements: Extraocular movements intact.      Conjunctiva/sclera: Conjunctivae normal.      Pupils: Pupils are equal, round, and reactive to light.   Cardiovascular:      Rate and Rhythm: Normal rate and regular rhythm.      Pulses: Normal pulses.      Heart sounds: Normal heart sounds.   Pulmonary:      Effort: Pulmonary effort is normal.      Breath sounds: Rhonchi present.   Abdominal:      General: Bowel sounds are normal.      Palpations: Abdomen is soft.   Musculoskeletal:         General: Normal range of motion.      Cervical back: Normal range of motion and neck supple.   Skin:     General: Skin is warm and dry.   Neurological:      General: No focal deficit present.      Mental Status: She is alert. Mental status is at baseline.   Psychiatric:         Mood and Affect: Mood normal.         Behavior: Behavior normal.         Thought Content: Thought content normal.         Judgment: Judgment normal.       Assessment:       1. Bronchitis          Plan:     Bronchitis  -     cefTRIAXone injection 1 g  -     dexAMETHasone injection  4 mg    Other orders  -     azithromycin (Z-ALECIA) 250 MG tablet; Take 2 tablets by mouth on day 1; Take 1 tablet by mouth on days 2-5  Dispense: 6 tablet; Refill: 0  -     predniSONE (DELTASONE) 20 MG tablet; Take 1 tablet (20 mg total) by mouth once daily. for 5 days  Dispense: 5 tablet; Refill: 0  -     promethazine (PHENERGAN) 6.25 mg/5 mL syrup; Take 20 mLs (25 mg total) by mouth every 6 (six) hours as needed for Nausea.  Dispense: 200 mL; Refill: 0

## 2023-04-17 ENCOUNTER — PATIENT OUTREACH (OUTPATIENT)
Dept: ADMINISTRATIVE | Facility: HOSPITAL | Age: 68
End: 2023-04-17

## 2023-04-17 NOTE — PROGRESS NOTES
04/17/2023 Care Everywhere updates requested and reviewed.  Immunizations reconciled. Media reports reviewed.  Duplicate HM overrides removed.  Overdue HM topic chart audit and/or requested.     Hyperlinked Mammogram to chart  JEMMA sent for Eye Exam and Colonoscopy Report  Care Team Updated    Health Maintenance Due   Topic Date Due    Foot Exam  Never done    Mammogram  Never done    DEXA Scan  Never done    Pneumococcal Vaccines (Age 65+) (2 - PCV) 01/05/2023

## 2023-04-17 NOTE — LETTER
AUTHORIZATION FOR RELEASE OF   CONFIDENTIAL INFORMATION    Dear Dr. Selin Syed,    We are seeing Karie Kaufman, date of birth 1955, in the clinic at [facility]. Gabriel Mckenzie MD is the patient's PCP. Karie Kaufman has an outstanding lab/procedure at the time we reviewed her chart. In order to help keep her health information updated, she has authorized us to request the following medical record(s):        (  )  MAMMOGRAM                                      (  )  COLONOSCOPY      (  )  PAP SMEAR                                          (  )  OUTSIDE LAB RESULTS     (  )  DEXA SCAN                                          ( x )  EYE EXAM            (  )  FOOT EXAM                                          (  )  ENTIRE RECORD     (  )  OUTSIDE IMMUNIZATIONS                 (  )  _______________         Please fax records to Annette Gould LPN Care Coordinator at [phone]      If you have any questions, please contact Annette at [phone]           Patient Name: Karie Kaufman  : 1955  Patient Phone #: [phone]

## 2023-04-17 NOTE — LETTER
AUTHORIZATION FOR RELEASE OF   CONFIDENTIAL INFORMATION    Dear [facility],    We are seeing Karie Kaufman, date of birth 1955, in the clinic at [facility]. Gabriel Mckenzie MD is the patient's PCP. Karie Kaufman has an outstanding lab/procedure at the time we reviewed her chart. In order to help keep her health information updated, she has authorized us to request the following medical record(s):        (  )  MAMMOGRAM                                      ( x )  COLONOSCOPY      (  )  PAP SMEAR                                          (  )  OUTSIDE LAB RESULTS     (  )  DEXA SCAN                                          (  )  EYE EXAM            (  )  FOOT EXAM                                          (  )  ENTIRE RECORD     (  )  OUTSIDE IMMUNIZATIONS                 (  )  _______________         Please fax records to Annette Gould LPN Care Coordinator at  [phone]     If you have any questions, please contact Annette at [phone].           Patient Name: Karie Kaufman  : 1955  Patient Phone #: [phone]

## 2023-04-20 ENCOUNTER — OFFICE VISIT (OUTPATIENT)
Dept: FAMILY MEDICINE | Facility: CLINIC | Age: 68
End: 2023-04-20
Payer: MEDICARE

## 2023-04-20 VITALS
RESPIRATION RATE: 18 BRPM | BODY MASS INDEX: 37.83 KG/M2 | DIASTOLIC BLOOD PRESSURE: 80 MMHG | SYSTOLIC BLOOD PRESSURE: 117 MMHG | HEIGHT: 67 IN | TEMPERATURE: 97 F | OXYGEN SATURATION: 96 % | HEART RATE: 60 BPM | WEIGHT: 241 LBS

## 2023-04-20 DIAGNOSIS — E78.5 DYSLIPIDEMIA: ICD-10-CM

## 2023-04-20 DIAGNOSIS — E11.9 TYPE 2 DIABETES MELLITUS WITHOUT COMPLICATION, WITHOUT LONG-TERM CURRENT USE OF INSULIN: ICD-10-CM

## 2023-04-20 DIAGNOSIS — M19.012 OSTEOARTHRITIS OF LEFT SHOULDER, UNSPECIFIED OSTEOARTHRITIS TYPE: Primary | ICD-10-CM

## 2023-04-20 DIAGNOSIS — E66.9 CLASS 2 OBESITY WITH BODY MASS INDEX (BMI) OF 36.0 TO 36.9 IN ADULT, UNSPECIFIED OBESITY TYPE, UNSPECIFIED WHETHER SERIOUS COMORBIDITY PRESENT: ICD-10-CM

## 2023-04-20 PROCEDURE — 3061F NEG MICROALBUMINURIA REV: CPT | Mod: ,,, | Performed by: INTERNAL MEDICINE

## 2023-04-20 PROCEDURE — 1101F PR PT FALLS ASSESS DOC 0-1 FALLS W/OUT INJ PAST YR: ICD-10-PCS | Mod: ,,, | Performed by: INTERNAL MEDICINE

## 2023-04-20 PROCEDURE — 1160F RVW MEDS BY RX/DR IN RCRD: CPT | Mod: ,,, | Performed by: INTERNAL MEDICINE

## 2023-04-20 PROCEDURE — 3008F PR BODY MASS INDEX (BMI) DOCUMENTED: ICD-10-PCS | Mod: ,,, | Performed by: INTERNAL MEDICINE

## 2023-04-20 PROCEDURE — 4010F PR ACE/ARB THEARPY RXD/TAKEN: ICD-10-PCS | Mod: ,,, | Performed by: INTERNAL MEDICINE

## 2023-04-20 PROCEDURE — 3074F SYST BP LT 130 MM HG: CPT | Mod: ,,, | Performed by: INTERNAL MEDICINE

## 2023-04-20 PROCEDURE — 3051F HG A1C>EQUAL 7.0%<8.0%: CPT | Mod: ,,, | Performed by: INTERNAL MEDICINE

## 2023-04-20 PROCEDURE — 1159F MED LIST DOCD IN RCRD: CPT | Mod: ,,, | Performed by: INTERNAL MEDICINE

## 2023-04-20 PROCEDURE — 4010F ACE/ARB THERAPY RXD/TAKEN: CPT | Mod: ,,, | Performed by: INTERNAL MEDICINE

## 2023-04-20 PROCEDURE — 3008F BODY MASS INDEX DOCD: CPT | Mod: ,,, | Performed by: INTERNAL MEDICINE

## 2023-04-20 PROCEDURE — 1159F PR MEDICATION LIST DOCUMENTED IN MEDICAL RECORD: ICD-10-PCS | Mod: ,,, | Performed by: INTERNAL MEDICINE

## 2023-04-20 PROCEDURE — 1160F PR REVIEW ALL MEDS BY PRESCRIBER/CLIN PHARMACIST DOCUMENTED: ICD-10-PCS | Mod: ,,, | Performed by: INTERNAL MEDICINE

## 2023-04-20 PROCEDURE — 99214 OFFICE O/P EST MOD 30 MIN: CPT | Mod: ,,, | Performed by: INTERNAL MEDICINE

## 2023-04-20 PROCEDURE — 3288F PR FALLS RISK ASSESSMENT DOCUMENTED: ICD-10-PCS | Mod: ,,, | Performed by: INTERNAL MEDICINE

## 2023-04-20 PROCEDURE — 1101F PT FALLS ASSESS-DOCD LE1/YR: CPT | Mod: ,,, | Performed by: INTERNAL MEDICINE

## 2023-04-20 PROCEDURE — 3074F PR MOST RECENT SYSTOLIC BLOOD PRESSURE < 130 MM HG: ICD-10-PCS | Mod: ,,, | Performed by: INTERNAL MEDICINE

## 2023-04-20 PROCEDURE — 1125F PR PAIN SEVERITY QUANTIFIED, PAIN PRESENT: ICD-10-PCS | Mod: ,,, | Performed by: INTERNAL MEDICINE

## 2023-04-20 PROCEDURE — 3066F PR DOCUMENTATION OF TREATMENT FOR NEPHROPATHY: ICD-10-PCS | Mod: ,,, | Performed by: INTERNAL MEDICINE

## 2023-04-20 PROCEDURE — 3051F PR MOST RECENT HEMOGLOBIN A1C LEVEL 7.0 - < 8.0%: ICD-10-PCS | Mod: ,,, | Performed by: INTERNAL MEDICINE

## 2023-04-20 PROCEDURE — 1125F AMNT PAIN NOTED PAIN PRSNT: CPT | Mod: ,,, | Performed by: INTERNAL MEDICINE

## 2023-04-20 PROCEDURE — 3066F NEPHROPATHY DOC TX: CPT | Mod: ,,, | Performed by: INTERNAL MEDICINE

## 2023-04-20 PROCEDURE — 3079F PR MOST RECENT DIASTOLIC BLOOD PRESSURE 80-89 MM HG: ICD-10-PCS | Mod: ,,, | Performed by: INTERNAL MEDICINE

## 2023-04-20 PROCEDURE — 99214 PR OFFICE/OUTPT VISIT, EST, LEVL IV, 30-39 MIN: ICD-10-PCS | Mod: ,,, | Performed by: INTERNAL MEDICINE

## 2023-04-20 PROCEDURE — 3061F PR NEG MICROALBUMINURIA RESULT DOCUMENTED/REVIEW: ICD-10-PCS | Mod: ,,, | Performed by: INTERNAL MEDICINE

## 2023-04-20 PROCEDURE — 3288F FALL RISK ASSESSMENT DOCD: CPT | Mod: ,,, | Performed by: INTERNAL MEDICINE

## 2023-04-20 PROCEDURE — 3079F DIAST BP 80-89 MM HG: CPT | Mod: ,,, | Performed by: INTERNAL MEDICINE

## 2023-04-20 RX ORDER — ATORVASTATIN CALCIUM 80 MG/1
80 TABLET, FILM COATED ORAL DAILY
Qty: 90 TABLET | Refills: 0 | Status: SHIPPED | OUTPATIENT
Start: 2023-04-20 | End: 2023-09-06 | Stop reason: SDUPTHER

## 2023-04-20 RX ORDER — MELOXICAM 15 MG/1
15 TABLET ORAL DAILY
Qty: 30 TABLET | Refills: 2 | Status: SHIPPED | OUTPATIENT
Start: 2023-04-20 | End: 2023-09-06 | Stop reason: SDUPTHER

## 2023-04-20 NOTE — PROGRESS NOTES
Care gaps discussed with patient. Patient states her mammogram was sep or oct of 2022. Patient dexa scan and foot exam has been ordered. Patient is due for pneumonia vaccine, patient refused vaccine stating she does not want the vaccine.

## 2023-04-21 ENCOUNTER — PATIENT OUTREACH (OUTPATIENT)
Dept: ADMINISTRATIVE | Facility: HOSPITAL | Age: 68
End: 2023-04-21

## 2023-05-01 ENCOUNTER — OFFICE VISIT (OUTPATIENT)
Dept: FAMILY MEDICINE | Facility: CLINIC | Age: 68
End: 2023-05-01
Payer: MEDICARE

## 2023-05-01 VITALS
WEIGHT: 236.19 LBS | DIASTOLIC BLOOD PRESSURE: 72 MMHG | BODY MASS INDEX: 37.07 KG/M2 | TEMPERATURE: 98 F | HEART RATE: 62 BPM | RESPIRATION RATE: 18 BRPM | SYSTOLIC BLOOD PRESSURE: 116 MMHG | OXYGEN SATURATION: 95 % | HEIGHT: 67 IN

## 2023-05-01 DIAGNOSIS — E66.9 CLASS 2 OBESITY WITH BODY MASS INDEX (BMI) OF 36.0 TO 36.9 IN ADULT, UNSPECIFIED OBESITY TYPE, UNSPECIFIED WHETHER SERIOUS COMORBIDITY PRESENT: Chronic | ICD-10-CM

## 2023-05-01 DIAGNOSIS — E11.9 TYPE 2 DIABETES MELLITUS WITHOUT COMPLICATION, WITHOUT LONG-TERM CURRENT USE OF INSULIN: Primary | Chronic | ICD-10-CM

## 2023-05-01 DIAGNOSIS — E78.5 DYSLIPIDEMIA: Chronic | ICD-10-CM

## 2023-05-01 PROBLEM — E66.812 CLASS 2 OBESITY WITH BODY MASS INDEX (BMI) OF 36.0 TO 36.9 IN ADULT: Status: ACTIVE | Noted: 2023-01-31

## 2023-05-01 LAB
ANION GAP SERPL CALCULATED.3IONS-SCNC: 7 MMOL/L (ref 7–16)
BUN SERPL-MCNC: 31 MG/DL (ref 7–18)
BUN/CREAT SERPL: 24 (ref 6–20)
CALCIUM SERPL-MCNC: 10.2 MG/DL (ref 8.5–10.1)
CHLORIDE SERPL-SCNC: 107 MMOL/L (ref 98–107)
CHOLEST SERPL-MCNC: 157 MG/DL (ref 0–200)
CHOLEST/HDLC SERPL: 2.7 {RATIO}
CO2 SERPL-SCNC: 27 MMOL/L (ref 21–32)
CREAT SERPL-MCNC: 1.28 MG/DL (ref 0.55–1.02)
EGFR (NO RACE VARIABLE) (RUSH/TITUS): 46 ML/MIN/1.73M2
EST. AVERAGE GLUCOSE BLD GHB EST-MCNC: 154 MG/DL
GLUCOSE SERPL-MCNC: 162 MG/DL (ref 74–106)
HBA1C MFR BLD HPLC: 7.2 % (ref 4.5–6.6)
HDLC SERPL-MCNC: 59 MG/DL (ref 40–60)
LDLC SERPL CALC-MCNC: 83 MG/DL
LDLC/HDLC SERPL: 1.4 {RATIO}
NONHDLC SERPL-MCNC: 98 MG/DL
POTASSIUM SERPL-SCNC: 4.8 MMOL/L (ref 3.5–5.1)
SODIUM SERPL-SCNC: 136 MMOL/L (ref 136–145)
TRIGL SERPL-MCNC: 77 MG/DL (ref 35–150)
VLDLC SERPL-MCNC: 15 MG/DL

## 2023-05-01 PROCEDURE — 83036 HEMOGLOBIN A1C: ICD-10-PCS | Mod: ,,, | Performed by: CLINICAL MEDICAL LABORATORY

## 2023-05-01 PROCEDURE — 3074F PR MOST RECENT SYSTOLIC BLOOD PRESSURE < 130 MM HG: ICD-10-PCS | Mod: ,,, | Performed by: INTERNAL MEDICINE

## 2023-05-01 PROCEDURE — 3288F FALL RISK ASSESSMENT DOCD: CPT | Mod: ,,, | Performed by: INTERNAL MEDICINE

## 2023-05-01 PROCEDURE — 3061F PR NEG MICROALBUMINURIA RESULT DOCUMENTED/REVIEW: ICD-10-PCS | Mod: ,,, | Performed by: INTERNAL MEDICINE

## 2023-05-01 PROCEDURE — 80061 LIPID PANEL: ICD-10-PCS | Mod: ,,, | Performed by: CLINICAL MEDICAL LABORATORY

## 2023-05-01 PROCEDURE — 3066F PR DOCUMENTATION OF TREATMENT FOR NEPHROPATHY: ICD-10-PCS | Mod: ,,, | Performed by: INTERNAL MEDICINE

## 2023-05-01 PROCEDURE — 1159F PR MEDICATION LIST DOCUMENTED IN MEDICAL RECORD: ICD-10-PCS | Mod: ,,, | Performed by: INTERNAL MEDICINE

## 2023-05-01 PROCEDURE — 4010F ACE/ARB THERAPY RXD/TAKEN: CPT | Mod: ,,, | Performed by: INTERNAL MEDICINE

## 2023-05-01 PROCEDURE — 99214 OFFICE O/P EST MOD 30 MIN: CPT | Mod: ,,, | Performed by: INTERNAL MEDICINE

## 2023-05-01 PROCEDURE — 3066F NEPHROPATHY DOC TX: CPT | Mod: ,,, | Performed by: INTERNAL MEDICINE

## 2023-05-01 PROCEDURE — 83036 HEMOGLOBIN GLYCOSYLATED A1C: CPT | Mod: ,,, | Performed by: CLINICAL MEDICAL LABORATORY

## 2023-05-01 PROCEDURE — 3051F PR MOST RECENT HEMOGLOBIN A1C LEVEL 7.0 - < 8.0%: ICD-10-PCS | Mod: ,,, | Performed by: INTERNAL MEDICINE

## 2023-05-01 PROCEDURE — 1101F PT FALLS ASSESS-DOCD LE1/YR: CPT | Mod: ,,, | Performed by: INTERNAL MEDICINE

## 2023-05-01 PROCEDURE — 80048 BASIC METABOLIC PANEL: ICD-10-PCS | Mod: ,,, | Performed by: CLINICAL MEDICAL LABORATORY

## 2023-05-01 PROCEDURE — 80061 LIPID PANEL: CPT | Mod: ,,, | Performed by: CLINICAL MEDICAL LABORATORY

## 2023-05-01 PROCEDURE — 1159F MED LIST DOCD IN RCRD: CPT | Mod: ,,, | Performed by: INTERNAL MEDICINE

## 2023-05-01 PROCEDURE — 3061F NEG MICROALBUMINURIA REV: CPT | Mod: ,,, | Performed by: INTERNAL MEDICINE

## 2023-05-01 PROCEDURE — 1126F AMNT PAIN NOTED NONE PRSNT: CPT | Mod: ,,, | Performed by: INTERNAL MEDICINE

## 2023-05-01 PROCEDURE — 3078F PR MOST RECENT DIASTOLIC BLOOD PRESSURE < 80 MM HG: ICD-10-PCS | Mod: ,,, | Performed by: INTERNAL MEDICINE

## 2023-05-01 PROCEDURE — 1101F PR PT FALLS ASSESS DOC 0-1 FALLS W/OUT INJ PAST YR: ICD-10-PCS | Mod: ,,, | Performed by: INTERNAL MEDICINE

## 2023-05-01 PROCEDURE — 80048 BASIC METABOLIC PNL TOTAL CA: CPT | Mod: ,,, | Performed by: CLINICAL MEDICAL LABORATORY

## 2023-05-01 PROCEDURE — 3288F PR FALLS RISK ASSESSMENT DOCUMENTED: ICD-10-PCS | Mod: ,,, | Performed by: INTERNAL MEDICINE

## 2023-05-01 PROCEDURE — 3008F BODY MASS INDEX DOCD: CPT | Mod: ,,, | Performed by: INTERNAL MEDICINE

## 2023-05-01 PROCEDURE — 3078F DIAST BP <80 MM HG: CPT | Mod: ,,, | Performed by: INTERNAL MEDICINE

## 2023-05-01 PROCEDURE — 3008F PR BODY MASS INDEX (BMI) DOCUMENTED: ICD-10-PCS | Mod: ,,, | Performed by: INTERNAL MEDICINE

## 2023-05-01 PROCEDURE — 4010F PR ACE/ARB THEARPY RXD/TAKEN: ICD-10-PCS | Mod: ,,, | Performed by: INTERNAL MEDICINE

## 2023-05-01 PROCEDURE — 1160F PR REVIEW ALL MEDS BY PRESCRIBER/CLIN PHARMACIST DOCUMENTED: ICD-10-PCS | Mod: ,,, | Performed by: INTERNAL MEDICINE

## 2023-05-01 PROCEDURE — 3074F SYST BP LT 130 MM HG: CPT | Mod: ,,, | Performed by: INTERNAL MEDICINE

## 2023-05-01 PROCEDURE — 3051F HG A1C>EQUAL 7.0%<8.0%: CPT | Mod: ,,, | Performed by: INTERNAL MEDICINE

## 2023-05-01 PROCEDURE — 1126F PR PAIN SEVERITY QUANTIFIED, NO PAIN PRESENT: ICD-10-PCS | Mod: ,,, | Performed by: INTERNAL MEDICINE

## 2023-05-01 PROCEDURE — 1160F RVW MEDS BY RX/DR IN RCRD: CPT | Mod: ,,, | Performed by: INTERNAL MEDICINE

## 2023-05-01 PROCEDURE — 99214 PR OFFICE/OUTPT VISIT, EST, LEVL IV, 30-39 MIN: ICD-10-PCS | Mod: ,,, | Performed by: INTERNAL MEDICINE

## 2023-05-01 RX ORDER — GLIPIZIDE 10 MG/1
10 TABLET ORAL 2 TIMES DAILY
Qty: 180 TABLET | Refills: 1 | Status: SHIPPED | OUTPATIENT
Start: 2023-05-01 | End: 2023-09-06 | Stop reason: SDUPTHER

## 2023-05-01 NOTE — PROGRESS NOTES
Care gaps discussed with patient. Patient dexa scan and foot exam has been ordered. Patient is due for the pneumonia vaccine, patient refused the pneumonia vaccine stating she does not want it. Patient states her mammogram was done September or October of 2022.

## 2023-05-01 NOTE — PROGRESS NOTES
Subjective:       Patient ID: Karie Kaufman is a 67 y.o. female.    Chief Complaint: Shoulder Pain (Left )    Shoulder Pain   Pertinent negatives include no fever.   .  Patient seen and evaluated today patient is awake alert patient is in no acute distress.  She does have moderate discomfort in the left shoulder.  The plan will be to occupational therapy over-the-counter Tylenol in regards to dyslipidemia plan Lipitor 80 mg q.day in regards to obesity diet and exercise lifestyle modifications  Current Medications:    Current Outpatient Medications:     acetaminophen (TYLENOL) 500 MG tablet, Take 500 mg by mouth every 6 (six) hours as needed for Pain., Disp: , Rfl:     aspirin (ECOTRIN) 81 MG EC tablet, Take 81 mg by mouth once daily., Disp: , Rfl:     atorvastatin (LIPITOR) 80 MG tablet, Take 1 tablet (80 mg total) by mouth once daily., Disp: 90 tablet, Rfl: 0    carvediloL (COREG) 6.25 MG tablet, Take 6.25 mg by mouth 2 (two) times daily., Disp: , Rfl:     clopidogreL (PLAVIX) 75 mg tablet, Take 75 mg by mouth once daily., Disp: , Rfl:     ezetimibe (ZETIA) 10 mg tablet, Take 10 mg by mouth once daily., Disp: , Rfl:     glipiZIDE (GLUCOTROL) 10 MG tablet, Take 1 tablet (10 mg total) by mouth 2 (two) times a day., Disp: 180 tablet, Rfl: 1    lisinopriL-hydrochlorothiazide (PRINZIDE,ZESTORETIC) 20-12.5 mg per tablet, Take 1 tablet by mouth 2 (two) times daily., Disp: , Rfl:     metFORMIN (GLUCOPHAGE) 500 MG tablet, Take 1 tablet (500 mg total) by mouth 3 (three) times daily., Disp: 270 tablet, Rfl: 1    miSOPROStoL (CYTOTEC) 200 MCG Tab, INSERT ONE TABLET VAGINALLY AT BEDTIME WEDNESDAY NIGHT, Disp: , Rfl:     multivit-min/folic acid/biotin (HAIR,SKIN AND NAILS,FA-BIOTIN, ORAL), Take by mouth., Disp: , Rfl:     promethazine (PHENERGAN) 6.25 mg/5 mL syrup, Take 20 mLs (25 mg total) by mouth every 6 (six) hours as needed for Nausea., Disp: 200 mL, Rfl: 0    triamterene-hydrochlorothiazide 37.5-25 mg (MAXZIDE-25)  "37.5-25 mg per tablet, Take 1 tablet by mouth once daily., Disp: , Rfl:     vitamin D (VITAMIN D3) 1000 units Tab, Take 1,000 Units by mouth once daily., Disp: , Rfl:     azithromycin (Z-ALECIA) 250 MG tablet, Take 2 tablets by mouth on day 1; Take 1 tablet by mouth on days 2-5 (Patient not taking: Reported on 4/20/2023), Disp: 6 tablet, Rfl: 0    meloxicam (MOBIC) 15 MG tablet, Take 1 tablet (15 mg total) by mouth once daily., Disp: 30 tablet, Rfl: 2           Review of Systems   Constitutional:  Negative for appetite change, fatigue and fever.   Respiratory:  Negative for shortness of breath.    Cardiovascular:  Negative for chest pain.   Gastrointestinal:  Negative for abdominal pain and constipation.   Endocrine: Negative for polydipsia, polyphagia and polyuria.   Genitourinary:  Negative for difficulty urinating, frequency and hot flashes.   Allergic/Immunologic: Negative for environmental allergies.   Neurological:  Negative for dizziness and light-headedness.   Psychiatric/Behavioral:  Negative for agitation.               Vitals:    04/20/23 0904   BP: 117/80   BP Location: Right arm   Patient Position: Sitting   BP Method: Large (Automatic)   Pulse: 60   Resp: 18   Temp: 97.4 °F (36.3 °C)   TempSrc: Temporal   SpO2: 96%   Weight: 109.3 kg (241 lb)   Height: 5' 7" (1.702 m)        Physical Exam  Vitals and nursing note reviewed.   Constitutional:       Appearance: Normal appearance. She is obese.   Cardiovascular:      Rate and Rhythm: Normal rate and regular rhythm.      Pulses: Normal pulses.      Heart sounds: Normal heart sounds.   Pulmonary:      Effort: Pulmonary effort is normal.      Breath sounds: Normal breath sounds.   Abdominal:      General: Abdomen is flat. Bowel sounds are normal.      Palpations: Abdomen is soft.   Musculoskeletal:         General: Normal range of motion.   Skin:     General: Skin is warm and dry.   Neurological:      General: No focal deficit present.      Mental Status: She is " alert and oriented to person, place, and time. Mental status is at baseline.         Last Labs:     No visits with results within 1 Month(s) from this visit.   Latest known visit with results is:   Office Visit on 01/10/2023   Component Date Value    Sodium 01/10/2023 142     Potassium 01/10/2023 4.7     Chloride 01/10/2023 107     CO2 01/10/2023 32     Anion Gap 01/10/2023 8     Glucose 01/10/2023 143 (H)     BUN 01/10/2023 20 (H)     Creatinine 01/10/2023 0.88     BUN/Creatinine Ratio 01/10/2023 23 (H)     Calcium 01/10/2023 10.4 (H)     eGFR 01/10/2023 73     Hemoglobin A1C 01/10/2023 6.4     Estimated Average Glucose 01/10/2023 127     Triglycerides 01/10/2023 87     Cholesterol 01/10/2023 171     HDL Cholesterol 01/10/2023 71 (H)     Cholesterol/HDL Ratio (R* 01/10/2023 2.4     Non-HDL 01/10/2023 100     LDL Calculated 01/10/2023 83     LDL/HDL 01/10/2023 1.2     VLDL 01/10/2023 17     Protein, Urine 01/10/2023 5.2     Creatinine, Urine 01/10/2023 38     Microalbumin 01/10/2023 0.6     Microalbumin/Creatinine * 01/10/2023 15.8        Last Imaging:  X-Ray Foot 2 View Right  Narrative: EXAMINATION:  XR ANKLE 2 VIEW RIGHT; XR FOOT 2 VIEW RIGHT    CLINICAL HISTORY:  Pain in right foot    COMPARISON:  None available    FINDINGS:  No evidence of fracture seen.  The alignment of the joints appears normal.  Mild ankle, midfoot and 1st metatarsophalangeal joint degenerative change is present.  No soft tissue abnormality is seen.  Impression: Mild osteoarthrosis.    Electronically signed by: Andrea Luna  Date:    01/10/2023  Time:    11:24  X-Ray Ankle 2 View Right  Narrative: EXAMINATION:  XR ANKLE 2 VIEW RIGHT; XR FOOT 2 VIEW RIGHT    CLINICAL HISTORY:  Pain in right foot    COMPARISON:  None available    FINDINGS:  No evidence of fracture seen.  The alignment of the joints appears normal.  Mild ankle, midfoot and 1st metatarsophalangeal joint degenerative change is present.  No soft tissue abnormality is  seen.  Impression: Mild osteoarthrosis.    Electronically signed by: Andrea Luna  Date:    01/10/2023  Time:    11:24         **Labs and x-rays personally reviewed by me    ** reviewed      Objective:        Assessment:       1. Osteoarthritis of left shoulder, unspecified osteoarthritis type  Ambulatory referral/consult to Physical/Occupational Therapy      2. Type 2 diabetes mellitus without complication, without long-term current use of insulin        3. Class 2 obesity with body mass index (BMI) of 36.0 to 36.9 in adult, unspecified obesity type, unspecified whether serious comorbidity present        4. Dyslipidemia             Plan:         [unfilled]

## 2023-05-01 NOTE — Clinical Note
Patient states her mammogram was done sep or oct of 2022 and she does not know where it was done.  no

## 2023-05-02 ENCOUNTER — PATIENT OUTREACH (OUTPATIENT)
Dept: ADMINISTRATIVE | Facility: HOSPITAL | Age: 68
End: 2023-05-02

## 2023-05-02 ENCOUNTER — TELEPHONE (OUTPATIENT)
Dept: FAMILY MEDICINE | Facility: CLINIC | Age: 68
End: 2023-05-02
Payer: MEDICARE

## 2023-05-02 NOTE — TELEPHONE ENCOUNTER
----- Message from Gabriel Mckenzie MD sent at 5/1/2023  2:35 PM CDT -----  Need to see in  1 week please  abnl results     [id number] Call made to pt regarding above message; pt scheduled to come in on 5/10/23; informed pt to bring all medications to appt; pt voiced understanding and has no further questions at this time

## 2023-05-05 NOTE — PROGRESS NOTES
Subjective:       Patient ID: Karie Kaufman is a 67 y.o. female.    Chief Complaint: Diabetes    Diabetes  Pertinent negatives for hypoglycemia include no dizziness. Pertinent negatives for diabetes include no chest pain, no fatigue, no polydipsia, no polyphagia and no polyuria.   .  Patient seen and evaluated today patient is awake alert patient is in no acute distress patient voiced no complaints patient has diabetic.  Since she is diabetic I will check A1c and a BNP.  History of dyslipidemia will check her cholesterol.    Current Medications:    Current Outpatient Medications:     acetaminophen (TYLENOL) 500 MG tablet, Take 500 mg by mouth every 6 (six) hours as needed for Pain., Disp: , Rfl:     aspirin (ECOTRIN) 81 MG EC tablet, Take 81 mg by mouth once daily., Disp: , Rfl:     atorvastatin (LIPITOR) 80 MG tablet, Take 1 tablet (80 mg total) by mouth once daily., Disp: 90 tablet, Rfl: 0    carvediloL (COREG) 6.25 MG tablet, Take 6.25 mg by mouth 2 (two) times daily., Disp: , Rfl:     clopidogreL (PLAVIX) 75 mg tablet, Take 75 mg by mouth once daily., Disp: , Rfl:     ezetimibe (ZETIA) 10 mg tablet, Take 10 mg by mouth once daily., Disp: , Rfl:     lisinopriL-hydrochlorothiazide (PRINZIDE,ZESTORETIC) 20-12.5 mg per tablet, Take 1 tablet by mouth 2 (two) times daily., Disp: , Rfl:     meloxicam (MOBIC) 15 MG tablet, Take 1 tablet (15 mg total) by mouth once daily., Disp: 30 tablet, Rfl: 2    metFORMIN (GLUCOPHAGE) 500 MG tablet, Take 1 tablet (500 mg total) by mouth 3 (three) times daily., Disp: 270 tablet, Rfl: 1    miSOPROStoL (CYTOTEC) 200 MCG Tab, INSERT ONE TABLET VAGINALLY AT BEDTIME WEDNESDAY NIGHT, Disp: , Rfl:     multivit-min/folic acid/biotin (HAIR,SKIN AND NAILS,FA-BIOTIN, ORAL), Take by mouth., Disp: , Rfl:     promethazine (PHENERGAN) 6.25 mg/5 mL syrup, Take 20 mLs (25 mg total) by mouth every 6 (six) hours as needed for Nausea., Disp: 200 mL, Rfl: 0    triamterene-hydrochlorothiazide 37.5-25 mg  "(MAXZIDE-25) 37.5-25 mg per tablet, Take 1 tablet by mouth once daily., Disp: , Rfl:     vitamin D (VITAMIN D3) 1000 units Tab, Take 1,000 Units by mouth once daily., Disp: , Rfl:     azithromycin (Z-ALECIA) 250 MG tablet, Take 2 tablets by mouth on day 1; Take 1 tablet by mouth on days 2-5 (Patient not taking: Reported on 4/20/2023), Disp: 6 tablet, Rfl: 0    glipiZIDE (GLUCOTROL) 10 MG tablet, Take 1 tablet (10 mg total) by mouth 2 (two) times a day., Disp: 180 tablet, Rfl: 1           Review of Systems   Constitutional:  Negative for appetite change, fatigue and fever.   Respiratory:  Negative for shortness of breath.    Cardiovascular:  Negative for chest pain.   Gastrointestinal:  Negative for abdominal pain and constipation.   Endocrine: Negative for polydipsia, polyphagia and polyuria.   Genitourinary:  Negative for difficulty urinating, frequency and hot flashes.   Allergic/Immunologic: Negative for environmental allergies.   Neurological:  Negative for dizziness and light-headedness.   Psychiatric/Behavioral:  Negative for agitation.               Vitals:    05/01/23 1001   BP: 116/72   BP Location: Right arm   Patient Position: Sitting   BP Method: Large (Automatic)   Pulse: 62   Resp: 18   Temp: 97.8 °F (36.6 °C)   TempSrc: Temporal   SpO2: 95%   Weight: 107.1 kg (236 lb 3.2 oz)   Height: 5' 7" (1.702 m)        Physical Exam  Vitals and nursing note reviewed.   Constitutional:       Appearance: Normal appearance. She is obese.   Cardiovascular:      Rate and Rhythm: Normal rate and regular rhythm.      Pulses: Normal pulses.      Heart sounds: Normal heart sounds.   Pulmonary:      Effort: Pulmonary effort is normal.      Breath sounds: Normal breath sounds.   Abdominal:      General: Abdomen is flat. Bowel sounds are normal.      Palpations: Abdomen is soft.   Musculoskeletal:         General: Normal range of motion.   Skin:     General: Skin is warm and dry.   Neurological:      General: No focal deficit " present.      Mental Status: She is alert and oriented to person, place, and time. Mental status is at baseline.         Last Labs:     Office Visit on 05/01/2023   Component Date Value    Sodium 05/01/2023 136     Potassium 05/01/2023 4.8     Chloride 05/01/2023 107     CO2 05/01/2023 27     Anion Gap 05/01/2023 7     Glucose 05/01/2023 162 (H)     BUN 05/01/2023 31 (H)     Creatinine 05/01/2023 1.28 (H)     BUN/Creatinine Ratio 05/01/2023 24 (H)     Calcium 05/01/2023 10.2 (H)     eGFR 05/01/2023 46 (L)     Hemoglobin A1C 05/01/2023 7.2 (H)     Estimated Average Glucose 05/01/2023 154     Triglycerides 05/01/2023 77     Cholesterol 05/01/2023 157     HDL Cholesterol 05/01/2023 59     Cholesterol/HDL Ratio (R* 05/01/2023 2.7     Non-HDL 05/01/2023 98     LDL Calculated 05/01/2023 83     LDL/HDL 05/01/2023 1.4     VLDL 05/01/2023 15    Patient Outreach on 04/21/2023   Component Date Value    Left Eye DM Retinopathy 08/15/2022 Positive     Right Eye DM Retinopathy 08/15/2022 Positive        Last Imaging:  X-Ray Foot 2 View Right  Narrative: EXAMINATION:  XR ANKLE 2 VIEW RIGHT; XR FOOT 2 VIEW RIGHT    CLINICAL HISTORY:  Pain in right foot    COMPARISON:  None available    FINDINGS:  No evidence of fracture seen.  The alignment of the joints appears normal.  Mild ankle, midfoot and 1st metatarsophalangeal joint degenerative change is present.  No soft tissue abnormality is seen.  Impression: Mild osteoarthrosis.    Electronically signed by: Andrea Luna  Date:    01/10/2023  Time:    11:24  X-Ray Ankle 2 View Right  Narrative: EXAMINATION:  XR ANKLE 2 VIEW RIGHT; XR FOOT 2 VIEW RIGHT    CLINICAL HISTORY:  Pain in right foot    COMPARISON:  None available    FINDINGS:  No evidence of fracture seen.  The alignment of the joints appears normal.  Mild ankle, midfoot and 1st metatarsophalangeal joint degenerative change is present.  No soft tissue abnormality is seen.  Impression: Mild osteoarthrosis.    Electronically  signed by: Andrea Luna  Date:    01/10/2023  Time:    11:24         **Labs and x-rays personally reviewed by me    ** reviewed      Objective:        Assessment:       1. Type 2 diabetes mellitus without complication, without long-term current use of insulin  Basic Metabolic Panel  Refill Glucotrol    Hemoglobin A1C    Lipid Panel    Basic Metabolic Panel    Hemoglobin A1C    Lipid Panel      2. Class 2 obesity with body mass index (BMI) of 36.0 to 36.9 in adult, unspecified obesity type, unspecified whether serious comorbidity present  Recommend diet exercise and lifestyle modification      3. Dyslipidemia  Continue statins           Plan:         [unfilled]

## 2023-05-10 ENCOUNTER — OFFICE VISIT (OUTPATIENT)
Dept: FAMILY MEDICINE | Facility: CLINIC | Age: 68
End: 2023-05-10
Payer: MEDICARE

## 2023-05-10 VITALS
OXYGEN SATURATION: 97 % | HEART RATE: 60 BPM | BODY MASS INDEX: 37.01 KG/M2 | TEMPERATURE: 98 F | DIASTOLIC BLOOD PRESSURE: 60 MMHG | SYSTOLIC BLOOD PRESSURE: 92 MMHG | RESPIRATION RATE: 17 BRPM | HEIGHT: 67 IN | WEIGHT: 235.81 LBS

## 2023-05-10 DIAGNOSIS — I95.9 HYPOTENSION, UNSPECIFIED HYPOTENSION TYPE: ICD-10-CM

## 2023-05-10 DIAGNOSIS — E11.9 TYPE 2 DIABETES MELLITUS WITHOUT COMPLICATION, WITHOUT LONG-TERM CURRENT USE OF INSULIN: ICD-10-CM

## 2023-05-10 DIAGNOSIS — N18.1 CHRONIC KIDNEY DISEASE (CKD) STAGE G1/A3, GLOMERULAR FILTRATION RATE (GFR) EQUAL TO OR GREATER THAN 90 ML/MIN/1.73 SQUARE METER AND ALBUMINURIA CREATININE RATIO GREATER THAN 300 MG/G: ICD-10-CM

## 2023-05-10 DIAGNOSIS — E83.52 HYPERCALCEMIA: ICD-10-CM

## 2023-05-10 DIAGNOSIS — J06.9 UPPER RESPIRATORY TRACT INFECTION, UNSPECIFIED TYPE: Primary | ICD-10-CM

## 2023-05-10 PROCEDURE — 3051F PR MOST RECENT HEMOGLOBIN A1C LEVEL 7.0 - < 8.0%: ICD-10-PCS | Mod: ,,, | Performed by: INTERNAL MEDICINE

## 2023-05-10 PROCEDURE — 1101F PR PT FALLS ASSESS DOC 0-1 FALLS W/OUT INJ PAST YR: ICD-10-PCS | Mod: ,,, | Performed by: INTERNAL MEDICINE

## 2023-05-10 PROCEDURE — 3066F NEPHROPATHY DOC TX: CPT | Mod: ,,, | Performed by: INTERNAL MEDICINE

## 2023-05-10 PROCEDURE — 1126F AMNT PAIN NOTED NONE PRSNT: CPT | Mod: ,,, | Performed by: INTERNAL MEDICINE

## 2023-05-10 PROCEDURE — 3008F PR BODY MASS INDEX (BMI) DOCUMENTED: ICD-10-PCS | Mod: ,,, | Performed by: INTERNAL MEDICINE

## 2023-05-10 PROCEDURE — 3051F HG A1C>EQUAL 7.0%<8.0%: CPT | Mod: ,,, | Performed by: INTERNAL MEDICINE

## 2023-05-10 PROCEDURE — 99214 PR OFFICE/OUTPT VISIT, EST, LEVL IV, 30-39 MIN: ICD-10-PCS | Mod: ,,, | Performed by: INTERNAL MEDICINE

## 2023-05-10 PROCEDURE — 3288F PR FALLS RISK ASSESSMENT DOCUMENTED: ICD-10-PCS | Mod: ,,, | Performed by: INTERNAL MEDICINE

## 2023-05-10 PROCEDURE — 3078F DIAST BP <80 MM HG: CPT | Mod: ,,, | Performed by: INTERNAL MEDICINE

## 2023-05-10 PROCEDURE — 1126F PR PAIN SEVERITY QUANTIFIED, NO PAIN PRESENT: ICD-10-PCS | Mod: ,,, | Performed by: INTERNAL MEDICINE

## 2023-05-10 PROCEDURE — 3061F NEG MICROALBUMINURIA REV: CPT | Mod: ,,, | Performed by: INTERNAL MEDICINE

## 2023-05-10 PROCEDURE — 3074F SYST BP LT 130 MM HG: CPT | Mod: ,,, | Performed by: INTERNAL MEDICINE

## 2023-05-10 PROCEDURE — 3066F PR DOCUMENTATION OF TREATMENT FOR NEPHROPATHY: ICD-10-PCS | Mod: ,,, | Performed by: INTERNAL MEDICINE

## 2023-05-10 PROCEDURE — 99214 OFFICE O/P EST MOD 30 MIN: CPT | Mod: ,,, | Performed by: INTERNAL MEDICINE

## 2023-05-10 PROCEDURE — 3074F PR MOST RECENT SYSTOLIC BLOOD PRESSURE < 130 MM HG: ICD-10-PCS | Mod: ,,, | Performed by: INTERNAL MEDICINE

## 2023-05-10 PROCEDURE — 4010F PR ACE/ARB THEARPY RXD/TAKEN: ICD-10-PCS | Mod: ,,, | Performed by: INTERNAL MEDICINE

## 2023-05-10 PROCEDURE — 1101F PT FALLS ASSESS-DOCD LE1/YR: CPT | Mod: ,,, | Performed by: INTERNAL MEDICINE

## 2023-05-10 PROCEDURE — 1159F PR MEDICATION LIST DOCUMENTED IN MEDICAL RECORD: ICD-10-PCS | Mod: ,,, | Performed by: INTERNAL MEDICINE

## 2023-05-10 PROCEDURE — 3008F BODY MASS INDEX DOCD: CPT | Mod: ,,, | Performed by: INTERNAL MEDICINE

## 2023-05-10 PROCEDURE — 4010F ACE/ARB THERAPY RXD/TAKEN: CPT | Mod: ,,, | Performed by: INTERNAL MEDICINE

## 2023-05-10 PROCEDURE — 3288F FALL RISK ASSESSMENT DOCD: CPT | Mod: ,,, | Performed by: INTERNAL MEDICINE

## 2023-05-10 PROCEDURE — 1159F MED LIST DOCD IN RCRD: CPT | Mod: ,,, | Performed by: INTERNAL MEDICINE

## 2023-05-10 PROCEDURE — 1160F PR REVIEW ALL MEDS BY PRESCRIBER/CLIN PHARMACIST DOCUMENTED: ICD-10-PCS | Mod: ,,, | Performed by: INTERNAL MEDICINE

## 2023-05-10 PROCEDURE — 3061F PR NEG MICROALBUMINURIA RESULT DOCUMENTED/REVIEW: ICD-10-PCS | Mod: ,,, | Performed by: INTERNAL MEDICINE

## 2023-05-10 PROCEDURE — 1160F RVW MEDS BY RX/DR IN RCRD: CPT | Mod: ,,, | Performed by: INTERNAL MEDICINE

## 2023-05-10 PROCEDURE — 3078F PR MOST RECENT DIASTOLIC BLOOD PRESSURE < 80 MM HG: ICD-10-PCS | Mod: ,,, | Performed by: INTERNAL MEDICINE

## 2023-05-10 NOTE — PROGRESS NOTES
Care gaps discussed with patient. Patient foot exam and dexa scan has been ordered. Patient refused pneumonia vaccine, stating she does not want it.

## 2023-05-11 PROBLEM — J06.9 UPPER RESPIRATORY TRACT INFECTION: Status: ACTIVE | Noted: 2023-05-11

## 2023-05-11 PROBLEM — N18.1 CHRONIC KIDNEY DISEASE (CKD) STAGE G1/A3, GLOMERULAR FILTRATION RATE (GFR) EQUAL TO OR GREATER THAN 90 ML/MIN/1.73 SQUARE METER AND ALBUMINURIA CREATININE RATIO GREATER THAN 300 MG/G: Status: ACTIVE | Noted: 2023-05-11

## 2023-05-11 PROBLEM — E83.52 HYPERCALCEMIA: Status: ACTIVE | Noted: 2023-05-11

## 2023-05-11 PROBLEM — I95.9 HYPOTENSION: Status: ACTIVE | Noted: 2023-05-11

## 2023-05-11 RX ORDER — METFORMIN HYDROCHLORIDE 850 MG/1
850 TABLET ORAL 3 TIMES DAILY
Qty: 180 TABLET | Refills: 3 | Status: SHIPPED | OUTPATIENT
Start: 2023-05-11 | End: 2023-09-06 | Stop reason: SDUPTHER

## 2023-05-11 RX ORDER — GUAIFENESIN 600 MG/1
600 TABLET, EXTENDED RELEASE ORAL 2 TIMES DAILY
Qty: 30 TABLET | Refills: 0 | Status: SHIPPED | OUTPATIENT
Start: 2023-05-11

## 2023-05-11 RX ORDER — AZITHROMYCIN 250 MG/1
TABLET, FILM COATED ORAL
Qty: 6 TABLET | Refills: 1 | Status: SHIPPED | OUTPATIENT
Start: 2023-05-11

## 2023-05-11 NOTE — PROGRESS NOTES
Subjective:       Patient ID: Karie Kaufman is a 67 y.o. female.    Chief Complaint: Results (Abnl labs )    HPI  .  Patient seen and evaluated today patient is awake alert patient is in no acute distress patient complains of a cough with yellowish sputum.  Also A1c is elevated blood sugars have been elevated.  She also has hypercalcemia and chronic kidney disease as well   Please see the chart for additional details    Current Medications:    Current Outpatient Medications:     acetaminophen (TYLENOL) 500 MG tablet, Take 500 mg by mouth every 6 (six) hours as needed for Pain., Disp: , Rfl:     aspirin (ECOTRIN) 81 MG EC tablet, Take 81 mg by mouth once daily., Disp: , Rfl:     atorvastatin (LIPITOR) 80 MG tablet, Take 1 tablet (80 mg total) by mouth once daily., Disp: 90 tablet, Rfl: 0    carvediloL (COREG) 6.25 MG tablet, Take 6.25 mg by mouth 2 (two) times daily., Disp: , Rfl:     clopidogreL (PLAVIX) 75 mg tablet, Take 75 mg by mouth once daily., Disp: , Rfl:     ezetimibe (ZETIA) 10 mg tablet, Take 10 mg by mouth once daily., Disp: , Rfl:     glipiZIDE (GLUCOTROL) 10 MG tablet, Take 1 tablet (10 mg total) by mouth 2 (two) times a day., Disp: 180 tablet, Rfl: 1    lisinopriL-hydrochlorothiazide (PRINZIDE,ZESTORETIC) 20-12.5 mg per tablet, Take 1 tablet by mouth 2 (two) times daily., Disp: , Rfl:     meloxicam (MOBIC) 15 MG tablet, Take 1 tablet (15 mg total) by mouth once daily., Disp: 30 tablet, Rfl: 2    metFORMIN (GLUCOPHAGE) 500 MG tablet, Take 1 tablet (500 mg total) by mouth 3 (three) times daily., Disp: 270 tablet, Rfl: 1    miSOPROStoL (CYTOTEC) 200 MCG Tab, INSERT ONE TABLET VAGINALLY AT BEDTIME WEDNESDAY NIGHT, Disp: , Rfl:     multivit-min/folic acid/biotin (HAIR,SKIN AND NAILS,FA-BIOTIN, ORAL), Take by mouth., Disp: , Rfl:     promethazine (PHENERGAN) 6.25 mg/5 mL syrup, Take 20 mLs (25 mg total) by mouth every 6 (six) hours as needed for Nausea., Disp: 200 mL, Rfl: 0     "triamterene-hydrochlorothiazide 37.5-25 mg (MAXZIDE-25) 37.5-25 mg per tablet, Take 1 tablet by mouth once daily., Disp: , Rfl:     vitamin D (VITAMIN D3) 1000 units Tab, Take 1,000 Units by mouth once daily., Disp: , Rfl:     azithromycin (Z-ALECIA) 250 MG tablet, Take 2 tablets by mouth on day 1; Take 1 tablet by mouth on days 2-5 (Patient not taking: Reported on 4/20/2023), Disp: 6 tablet, Rfl: 0    azithromycin (Z-ALECIA) 250 MG tablet, Take 2 tablets by mouth on day 1; Take 1 tablet by mouth on days 2-5, Disp: 6 tablet, Rfl: 1    guaiFENesin (MUCINEX) 600 mg 12 hr tablet, Take 1 tablet (600 mg total) by mouth 2 (two) times daily., Disp: 30 tablet, Rfl: 0    metFORMIN (GLUCOPHAGE) 850 MG tablet, Take 1 tablet (850 mg total) by mouth 3 (three) times daily., Disp: 180 tablet, Rfl: 3           Review of Systems   Constitutional:  Negative for appetite change, fatigue and fever.   Respiratory:  Negative for shortness of breath.    Cardiovascular:  Negative for chest pain.   Gastrointestinal:  Negative for abdominal pain and constipation.   Endocrine: Negative for polydipsia, polyphagia and polyuria.   Genitourinary:  Negative for difficulty urinating, frequency and hot flashes.   Allergic/Immunologic: Negative for environmental allergies.   Neurological:  Negative for dizziness and light-headedness.   Psychiatric/Behavioral:  Negative for agitation.               Vitals:    05/10/23 1003 05/10/23 1004   BP: 90/60 92/60   BP Location: Right arm Left arm   Patient Position: Sitting    BP Method: Large (Automatic)    Pulse: 60    Resp: 17    Temp: 97.7 °F (36.5 °C)    TempSrc: Temporal    SpO2: 97%    Weight: 107 kg (235 lb 12.8 oz)    Height: 5' 7" (1.702 m)         Physical Exam  Vitals and nursing note reviewed.   Constitutional:       Appearance: Normal appearance.   Cardiovascular:      Rate and Rhythm: Normal rate and regular rhythm.      Pulses: Normal pulses.      Heart sounds: Normal heart sounds.   Pulmonary:      " Effort: Pulmonary effort is normal.      Breath sounds: Normal breath sounds.   Abdominal:      General: Abdomen is flat. Bowel sounds are normal.      Palpations: Abdomen is soft.   Musculoskeletal:         General: Normal range of motion.   Skin:     General: Skin is warm and dry.   Neurological:      General: No focal deficit present.      Mental Status: She is alert and oriented to person, place, and time. Mental status is at baseline.         Last Labs:     Patient Outreach on 05/02/2023   Component Date Value    BCS Recommendation Exter* 10/31/2022 Repeat mammogram in 1 year    Office Visit on 05/01/2023   Component Date Value    Sodium 05/01/2023 136     Potassium 05/01/2023 4.8     Chloride 05/01/2023 107     CO2 05/01/2023 27     Anion Gap 05/01/2023 7     Glucose 05/01/2023 162 (H)     BUN 05/01/2023 31 (H)     Creatinine 05/01/2023 1.28 (H)     BUN/Creatinine Ratio 05/01/2023 24 (H)     Calcium 05/01/2023 10.2 (H)     eGFR 05/01/2023 46 (L)     Hemoglobin A1C 05/01/2023 7.2 (H)     Estimated Average Glucose 05/01/2023 154     Triglycerides 05/01/2023 77     Cholesterol 05/01/2023 157     HDL Cholesterol 05/01/2023 59     Cholesterol/HDL Ratio (R* 05/01/2023 2.7     Non-HDL 05/01/2023 98     LDL Calculated 05/01/2023 83     LDL/HDL 05/01/2023 1.4     VLDL 05/01/2023 15    Patient Outreach on 04/21/2023   Component Date Value    Left Eye DM Retinopathy 08/15/2022 Positive     Right Eye DM Retinopathy 08/15/2022 Positive        Last Imaging:  X-Ray Foot 2 View Right  Narrative: EXAMINATION:  XR ANKLE 2 VIEW RIGHT; XR FOOT 2 VIEW RIGHT    CLINICAL HISTORY:  Pain in right foot    COMPARISON:  None available    FINDINGS:  No evidence of fracture seen.  The alignment of the joints appears normal.  Mild ankle, midfoot and 1st metatarsophalangeal joint degenerative change is present.  No soft tissue abnormality is seen.  Impression: Mild osteoarthrosis.    Electronically signed by: Andrea  Cheryl  Date:    01/10/2023  Time:    11:24  X-Ray Ankle 2 View Right  Narrative: EXAMINATION:  XR ANKLE 2 VIEW RIGHT; XR FOOT 2 VIEW RIGHT    CLINICAL HISTORY:  Pain in right foot    COMPARISON:  None available    FINDINGS:  No evidence of fracture seen.  The alignment of the joints appears normal.  Mild ankle, midfoot and 1st metatarsophalangeal joint degenerative change is present.  No soft tissue abnormality is seen.  Impression: Mild osteoarthrosis.    Electronically signed by: Andrea Luna  Date:    01/10/2023  Time:    11:24         **Labs and x-rays personally reviewed by me    ** reviewed      Objective:        Assessment:       1. Upper respiratory tract infection, unspecified type  Z-Tarun, guaifenesin 600 mg 1 p.o. b.i.d.      2. Chronic kidney disease (CKD) stage G1/A3, glomerular filtration rate (GFR) equal to or greater than 90 mL/min/1.73 square meter and albuminuria creatinine ratio greater than 300 mg/g  Follow creatinine and GFR closely      3. Hypotension, unspecified hypotension type  Blood pressure is low as 90/60, I am going to discontinue the Coreg and Zestoretic for least 24-48 hours and re-evaluate the patient.      4. Type 2 diabetes mellitus without complication, without long-term current use of insulin  Diabetes is poorly controlled therefore going to increase the metformin to 850 mg 1 p.o. 3 times a day.      5. Hypercalcemia  Follow closely, repeat calcium in the proximally 30-60 days.           Plan:         [unfilled]

## 2023-05-12 ENCOUNTER — OFFICE VISIT (OUTPATIENT)
Dept: FAMILY MEDICINE | Facility: CLINIC | Age: 68
End: 2023-05-12
Payer: MEDICARE

## 2023-05-12 VITALS
HEART RATE: 61 BPM | WEIGHT: 235 LBS | RESPIRATION RATE: 18 BRPM | OXYGEN SATURATION: 97 % | TEMPERATURE: 98 F | BODY MASS INDEX: 36.88 KG/M2 | HEIGHT: 67 IN | DIASTOLIC BLOOD PRESSURE: 83 MMHG | SYSTOLIC BLOOD PRESSURE: 137 MMHG

## 2023-05-12 DIAGNOSIS — E66.9 OBESITY, UNSPECIFIED CLASSIFICATION, UNSPECIFIED OBESITY TYPE, UNSPECIFIED WHETHER SERIOUS COMORBIDITY PRESENT: ICD-10-CM

## 2023-05-12 DIAGNOSIS — I10 PRIMARY HYPERTENSION: Primary | ICD-10-CM

## 2023-05-12 PROCEDURE — 4010F ACE/ARB THERAPY RXD/TAKEN: CPT | Mod: ,,, | Performed by: INTERNAL MEDICINE

## 2023-05-12 PROCEDURE — 3079F DIAST BP 80-89 MM HG: CPT | Mod: ,,, | Performed by: INTERNAL MEDICINE

## 2023-05-12 PROCEDURE — 3075F SYST BP GE 130 - 139MM HG: CPT | Mod: ,,, | Performed by: INTERNAL MEDICINE

## 2023-05-12 PROCEDURE — 3008F BODY MASS INDEX DOCD: CPT | Mod: ,,, | Performed by: INTERNAL MEDICINE

## 2023-05-12 PROCEDURE — 3288F FALL RISK ASSESSMENT DOCD: CPT | Mod: ,,, | Performed by: INTERNAL MEDICINE

## 2023-05-12 PROCEDURE — 3061F NEG MICROALBUMINURIA REV: CPT | Mod: ,,, | Performed by: INTERNAL MEDICINE

## 2023-05-12 PROCEDURE — 1159F PR MEDICATION LIST DOCUMENTED IN MEDICAL RECORD: ICD-10-PCS | Mod: ,,, | Performed by: INTERNAL MEDICINE

## 2023-05-12 PROCEDURE — 3008F PR BODY MASS INDEX (BMI) DOCUMENTED: ICD-10-PCS | Mod: ,,, | Performed by: INTERNAL MEDICINE

## 2023-05-12 PROCEDURE — 99213 OFFICE O/P EST LOW 20 MIN: CPT | Mod: ,,, | Performed by: INTERNAL MEDICINE

## 2023-05-12 PROCEDURE — 3079F PR MOST RECENT DIASTOLIC BLOOD PRESSURE 80-89 MM HG: ICD-10-PCS | Mod: ,,, | Performed by: INTERNAL MEDICINE

## 2023-05-12 PROCEDURE — 4010F PR ACE/ARB THEARPY RXD/TAKEN: ICD-10-PCS | Mod: ,,, | Performed by: INTERNAL MEDICINE

## 2023-05-12 PROCEDURE — 3066F NEPHROPATHY DOC TX: CPT | Mod: ,,, | Performed by: INTERNAL MEDICINE

## 2023-05-12 PROCEDURE — 1159F MED LIST DOCD IN RCRD: CPT | Mod: ,,, | Performed by: INTERNAL MEDICINE

## 2023-05-12 PROCEDURE — 3066F PR DOCUMENTATION OF TREATMENT FOR NEPHROPATHY: ICD-10-PCS | Mod: ,,, | Performed by: INTERNAL MEDICINE

## 2023-05-12 PROCEDURE — 1126F PR PAIN SEVERITY QUANTIFIED, NO PAIN PRESENT: ICD-10-PCS | Mod: ,,, | Performed by: INTERNAL MEDICINE

## 2023-05-12 PROCEDURE — 3061F PR NEG MICROALBUMINURIA RESULT DOCUMENTED/REVIEW: ICD-10-PCS | Mod: ,,, | Performed by: INTERNAL MEDICINE

## 2023-05-12 PROCEDURE — 99213 PR OFFICE/OUTPT VISIT, EST, LEVL III, 20-29 MIN: ICD-10-PCS | Mod: ,,, | Performed by: INTERNAL MEDICINE

## 2023-05-12 PROCEDURE — 3051F PR MOST RECENT HEMOGLOBIN A1C LEVEL 7.0 - < 8.0%: ICD-10-PCS | Mod: ,,, | Performed by: INTERNAL MEDICINE

## 2023-05-12 PROCEDURE — 3288F PR FALLS RISK ASSESSMENT DOCUMENTED: ICD-10-PCS | Mod: ,,, | Performed by: INTERNAL MEDICINE

## 2023-05-12 PROCEDURE — 3075F PR MOST RECENT SYSTOLIC BLOOD PRESS GE 130-139MM HG: ICD-10-PCS | Mod: ,,, | Performed by: INTERNAL MEDICINE

## 2023-05-12 PROCEDURE — 1126F AMNT PAIN NOTED NONE PRSNT: CPT | Mod: ,,, | Performed by: INTERNAL MEDICINE

## 2023-05-12 PROCEDURE — 3051F HG A1C>EQUAL 7.0%<8.0%: CPT | Mod: ,,, | Performed by: INTERNAL MEDICINE

## 2023-05-12 PROCEDURE — 1160F PR REVIEW ALL MEDS BY PRESCRIBER/CLIN PHARMACIST DOCUMENTED: ICD-10-PCS | Mod: ,,, | Performed by: INTERNAL MEDICINE

## 2023-05-12 PROCEDURE — 1101F PT FALLS ASSESS-DOCD LE1/YR: CPT | Mod: ,,, | Performed by: INTERNAL MEDICINE

## 2023-05-12 PROCEDURE — 1160F RVW MEDS BY RX/DR IN RCRD: CPT | Mod: ,,, | Performed by: INTERNAL MEDICINE

## 2023-05-12 PROCEDURE — 1101F PR PT FALLS ASSESS DOC 0-1 FALLS W/OUT INJ PAST YR: ICD-10-PCS | Mod: ,,, | Performed by: INTERNAL MEDICINE

## 2023-05-15 ENCOUNTER — PATIENT OUTREACH (OUTPATIENT)
Dept: ADMINISTRATIVE | Facility: HOSPITAL | Age: 68
End: 2023-05-15

## 2023-05-15 NOTE — PROGRESS NOTES
Subjective:       Patient ID: Karie Kaufman is a 67 y.o. female.    Chief Complaint: Hypertension    Hypertension  Pertinent negatives include no chest pain or shortness of breath.   .  Patient voiced no complaints today.  Except patient still has some problems with obesity.  Patient is hypertensive but blood pressure is normal today.  Patient blood pressure is 137/85.  BMI is elevated today.    Recommend low carb diet, exercise and lifestyle modification.  And I will refill the patient's appropriate antihypertensive agents.    Current Medications:    Current Outpatient Medications:     acetaminophen (TYLENOL) 500 MG tablet, Take 500 mg by mouth every 6 (six) hours as needed for Pain., Disp: , Rfl:     aspirin (ECOTRIN) 81 MG EC tablet, Take 81 mg by mouth once daily., Disp: , Rfl:     atorvastatin (LIPITOR) 80 MG tablet, Take 1 tablet (80 mg total) by mouth once daily., Disp: 90 tablet, Rfl: 0    azithromycin (Z-ALECIA) 250 MG tablet, Take 2 tablets by mouth on day 1; Take 1 tablet by mouth on days 2-5, Disp: 6 tablet, Rfl: 1    carvediloL (COREG) 6.25 MG tablet, Take 6.25 mg by mouth 2 (two) times daily., Disp: , Rfl:     clopidogreL (PLAVIX) 75 mg tablet, Take 75 mg by mouth once daily., Disp: , Rfl:     ezetimibe (ZETIA) 10 mg tablet, Take 10 mg by mouth once daily., Disp: , Rfl:     glipiZIDE (GLUCOTROL) 10 MG tablet, Take 1 tablet (10 mg total) by mouth 2 (two) times a day., Disp: 180 tablet, Rfl: 1    guaiFENesin (MUCINEX) 600 mg 12 hr tablet, Take 1 tablet (600 mg total) by mouth 2 (two) times daily., Disp: 30 tablet, Rfl: 0    lisinopriL-hydrochlorothiazide (PRINZIDE,ZESTORETIC) 20-12.5 mg per tablet, Take 1 tablet by mouth 2 (two) times daily., Disp: , Rfl:     meloxicam (MOBIC) 15 MG tablet, Take 1 tablet (15 mg total) by mouth once daily., Disp: 30 tablet, Rfl: 2    metFORMIN (GLUCOPHAGE) 500 MG tablet, Take 1 tablet (500 mg total) by mouth 3 (three) times daily., Disp: 270 tablet, Rfl: 1    metFORMIN  "(GLUCOPHAGE) 850 MG tablet, Take 1 tablet (850 mg total) by mouth 3 (three) times daily., Disp: 180 tablet, Rfl: 3    miSOPROStoL (CYTOTEC) 200 MCG Tab, INSERT ONE TABLET VAGINALLY AT BEDTIME WEDNESDAY NIGHT, Disp: , Rfl:     multivit-min/folic acid/biotin (HAIR,SKIN AND NAILS,FA-BIOTIN, ORAL), Take by mouth., Disp: , Rfl:     promethazine (PHENERGAN) 6.25 mg/5 mL syrup, Take 20 mLs (25 mg total) by mouth every 6 (six) hours as needed for Nausea., Disp: 200 mL, Rfl: 0    triamterene-hydrochlorothiazide 37.5-25 mg (MAXZIDE-25) 37.5-25 mg per tablet, Take 1 tablet by mouth once daily., Disp: , Rfl:     vitamin D (VITAMIN D3) 1000 units Tab, Take 1,000 Units by mouth once daily., Disp: , Rfl:     azithromycin (Z-ALECIA) 250 MG tablet, Take 2 tablets by mouth on day 1; Take 1 tablet by mouth on days 2-5 (Patient not taking: Reported on 4/20/2023), Disp: 6 tablet, Rfl: 0           Review of Systems   Constitutional:  Negative for appetite change, fatigue and fever.   Respiratory:  Negative for shortness of breath.    Cardiovascular:  Negative for chest pain.   Gastrointestinal:  Negative for abdominal pain and constipation.   Endocrine: Negative for polydipsia, polyphagia and polyuria.   Genitourinary:  Negative for difficulty urinating, frequency and hot flashes.   Allergic/Immunologic: Negative for environmental allergies.   Neurological:  Negative for dizziness and light-headedness.   Psychiatric/Behavioral:  Negative for agitation.               Vitals:    05/12/23 1016   BP: 137/83   BP Location: Right arm   Patient Position: Sitting   BP Method: Large (Automatic)   Pulse: 61   Resp: 18   Temp: 97.6 °F (36.4 °C)   TempSrc: Temporal   SpO2: 97%   Weight: 106.6 kg (235 lb)   Height: 5' 7" (1.702 m)        Physical Exam  Vitals and nursing note reviewed.   Constitutional:       Appearance: Normal appearance. She is obese.   Cardiovascular:      Rate and Rhythm: Normal rate and regular rhythm.      Pulses: Normal pulses. "      Heart sounds: Normal heart sounds.   Pulmonary:      Effort: Pulmonary effort is normal.      Breath sounds: Normal breath sounds.   Abdominal:      General: Abdomen is flat. Bowel sounds are normal.      Palpations: Abdomen is soft.   Musculoskeletal:         General: Normal range of motion.   Skin:     General: Skin is warm and dry.   Neurological:      General: No focal deficit present.      Mental Status: She is alert and oriented to person, place, and time. Mental status is at baseline.         Last Labs:     Patient Outreach on 05/02/2023   Component Date Value    BCS Recommendation Exter* 10/31/2022 Repeat mammogram in 1 year    Office Visit on 05/01/2023   Component Date Value    Sodium 05/01/2023 136     Potassium 05/01/2023 4.8     Chloride 05/01/2023 107     CO2 05/01/2023 27     Anion Gap 05/01/2023 7     Glucose 05/01/2023 162 (H)     BUN 05/01/2023 31 (H)     Creatinine 05/01/2023 1.28 (H)     BUN/Creatinine Ratio 05/01/2023 24 (H)     Calcium 05/01/2023 10.2 (H)     eGFR 05/01/2023 46 (L)     Hemoglobin A1C 05/01/2023 7.2 (H)     Estimated Average Glucose 05/01/2023 154     Triglycerides 05/01/2023 77     Cholesterol 05/01/2023 157     HDL Cholesterol 05/01/2023 59     Cholesterol/HDL Ratio (R* 05/01/2023 2.7     Non-HDL 05/01/2023 98     LDL Calculated 05/01/2023 83     LDL/HDL 05/01/2023 1.4     VLDL 05/01/2023 15    Patient Outreach on 04/21/2023   Component Date Value    Left Eye DM Retinopathy 08/15/2022 Positive     Right Eye DM Retinopathy 08/15/2022 Positive        Last Imaging:  X-Ray Foot 2 View Right  Narrative: EXAMINATION:  XR ANKLE 2 VIEW RIGHT; XR FOOT 2 VIEW RIGHT    CLINICAL HISTORY:  Pain in right foot    COMPARISON:  None available    FINDINGS:  No evidence of fracture seen.  The alignment of the joints appears normal.  Mild ankle, midfoot and 1st metatarsophalangeal joint degenerative change is present.  No soft tissue abnormality is seen.  Impression: Mild  osteoarthrosis.    Electronically signed by: Andrea Luna  Date:    01/10/2023  Time:    11:24  X-Ray Ankle 2 View Right  Narrative: EXAMINATION:  XR ANKLE 2 VIEW RIGHT; XR FOOT 2 VIEW RIGHT    CLINICAL HISTORY:  Pain in right foot    COMPARISON:  None available    FINDINGS:  No evidence of fracture seen.  The alignment of the joints appears normal.  Mild ankle, midfoot and 1st metatarsophalangeal joint degenerative change is present.  No soft tissue abnormality is seen.  Impression: Mild osteoarthrosis.    Electronically signed by: Andrea Luna  Date:    01/10/2023  Time:    11:24         **Labs and x-rays personally reviewed by me    ** reviewed      Objective:        Assessment:       1. Primary hypertension        2. Obesity, unspecified classification, unspecified obesity type, unspecified whether serious comorbidity present             Plan:         [unfilled]

## 2023-06-01 ENCOUNTER — OFFICE VISIT (OUTPATIENT)
Dept: FAMILY MEDICINE | Facility: CLINIC | Age: 68
End: 2023-06-01
Payer: MEDICARE

## 2023-06-01 VITALS
HEIGHT: 67 IN | HEART RATE: 60 BPM | RESPIRATION RATE: 18 BRPM | OXYGEN SATURATION: 97 % | WEIGHT: 236 LBS | TEMPERATURE: 98 F | BODY MASS INDEX: 37.04 KG/M2 | SYSTOLIC BLOOD PRESSURE: 127 MMHG | DIASTOLIC BLOOD PRESSURE: 66 MMHG

## 2023-06-01 DIAGNOSIS — Z13.1 SCREENING FOR DIABETES MELLITUS (DM): ICD-10-CM

## 2023-06-01 DIAGNOSIS — E66.09 CLASS 2 OBESITY DUE TO EXCESS CALORIES WITHOUT SERIOUS COMORBIDITY WITH BODY MASS INDEX (BMI) OF 36.0 TO 36.9 IN ADULT: ICD-10-CM

## 2023-06-01 DIAGNOSIS — I10 PRIMARY HYPERTENSION: Primary | ICD-10-CM

## 2023-06-01 DIAGNOSIS — E11.9 DM (DIABETES MELLITUS): ICD-10-CM

## 2023-06-01 PROCEDURE — 3074F PR MOST RECENT SYSTOLIC BLOOD PRESSURE < 130 MM HG: ICD-10-PCS | Mod: ,,, | Performed by: INTERNAL MEDICINE

## 2023-06-01 PROCEDURE — 1126F AMNT PAIN NOTED NONE PRSNT: CPT | Mod: ,,, | Performed by: INTERNAL MEDICINE

## 2023-06-01 PROCEDURE — 3061F NEG MICROALBUMINURIA REV: CPT | Mod: ,,, | Performed by: INTERNAL MEDICINE

## 2023-06-01 PROCEDURE — 3061F PR NEG MICROALBUMINURIA RESULT DOCUMENTED/REVIEW: ICD-10-PCS | Mod: ,,, | Performed by: INTERNAL MEDICINE

## 2023-06-01 PROCEDURE — 1160F RVW MEDS BY RX/DR IN RCRD: CPT | Mod: ,,, | Performed by: INTERNAL MEDICINE

## 2023-06-01 PROCEDURE — 1101F PR PT FALLS ASSESS DOC 0-1 FALLS W/OUT INJ PAST YR: ICD-10-PCS | Mod: ,,, | Performed by: INTERNAL MEDICINE

## 2023-06-01 PROCEDURE — 3066F NEPHROPATHY DOC TX: CPT | Mod: ,,, | Performed by: INTERNAL MEDICINE

## 2023-06-01 PROCEDURE — 3066F PR DOCUMENTATION OF TREATMENT FOR NEPHROPATHY: ICD-10-PCS | Mod: ,,, | Performed by: INTERNAL MEDICINE

## 2023-06-01 PROCEDURE — 1101F PT FALLS ASSESS-DOCD LE1/YR: CPT | Mod: ,,, | Performed by: INTERNAL MEDICINE

## 2023-06-01 PROCEDURE — 3078F PR MOST RECENT DIASTOLIC BLOOD PRESSURE < 80 MM HG: ICD-10-PCS | Mod: ,,, | Performed by: INTERNAL MEDICINE

## 2023-06-01 PROCEDURE — 3074F SYST BP LT 130 MM HG: CPT | Mod: ,,, | Performed by: INTERNAL MEDICINE

## 2023-06-01 PROCEDURE — 99214 OFFICE O/P EST MOD 30 MIN: CPT | Mod: ,,, | Performed by: INTERNAL MEDICINE

## 2023-06-01 PROCEDURE — 99214 PR OFFICE/OUTPT VISIT, EST, LEVL IV, 30-39 MIN: ICD-10-PCS | Mod: ,,, | Performed by: INTERNAL MEDICINE

## 2023-06-01 PROCEDURE — 1126F PR PAIN SEVERITY QUANTIFIED, NO PAIN PRESENT: ICD-10-PCS | Mod: ,,, | Performed by: INTERNAL MEDICINE

## 2023-06-01 PROCEDURE — 3008F BODY MASS INDEX DOCD: CPT | Mod: ,,, | Performed by: INTERNAL MEDICINE

## 2023-06-01 PROCEDURE — 1159F MED LIST DOCD IN RCRD: CPT | Mod: ,,, | Performed by: INTERNAL MEDICINE

## 2023-06-01 PROCEDURE — 4010F PR ACE/ARB THEARPY RXD/TAKEN: ICD-10-PCS | Mod: ,,, | Performed by: INTERNAL MEDICINE

## 2023-06-01 PROCEDURE — 3288F FALL RISK ASSESSMENT DOCD: CPT | Mod: ,,, | Performed by: INTERNAL MEDICINE

## 2023-06-01 PROCEDURE — 3051F HG A1C>EQUAL 7.0%<8.0%: CPT | Mod: ,,, | Performed by: INTERNAL MEDICINE

## 2023-06-01 PROCEDURE — 1160F PR REVIEW ALL MEDS BY PRESCRIBER/CLIN PHARMACIST DOCUMENTED: ICD-10-PCS | Mod: ,,, | Performed by: INTERNAL MEDICINE

## 2023-06-01 PROCEDURE — 3051F PR MOST RECENT HEMOGLOBIN A1C LEVEL 7.0 - < 8.0%: ICD-10-PCS | Mod: ,,, | Performed by: INTERNAL MEDICINE

## 2023-06-01 PROCEDURE — 1159F PR MEDICATION LIST DOCUMENTED IN MEDICAL RECORD: ICD-10-PCS | Mod: ,,, | Performed by: INTERNAL MEDICINE

## 2023-06-01 PROCEDURE — 3078F DIAST BP <80 MM HG: CPT | Mod: ,,, | Performed by: INTERNAL MEDICINE

## 2023-06-01 PROCEDURE — 3008F PR BODY MASS INDEX (BMI) DOCUMENTED: ICD-10-PCS | Mod: ,,, | Performed by: INTERNAL MEDICINE

## 2023-06-01 PROCEDURE — 3288F PR FALLS RISK ASSESSMENT DOCUMENTED: ICD-10-PCS | Mod: ,,, | Performed by: INTERNAL MEDICINE

## 2023-06-01 PROCEDURE — 4010F ACE/ARB THERAPY RXD/TAKEN: CPT | Mod: ,,, | Performed by: INTERNAL MEDICINE

## 2023-06-01 RX ORDER — SCOLOPAMINE TRANSDERMAL SYSTEM 1 MG/1
1 PATCH, EXTENDED RELEASE TRANSDERMAL
Qty: 5 PATCH | Refills: 0 | Status: SHIPPED | OUTPATIENT
Start: 2023-06-01

## 2023-06-01 NOTE — PROGRESS NOTES
Care gaps discussed with patient. Patient foot exam has been ordered. Patient eye exam has been ordered. Patient refused pneumonia vaccine, stating she does not want vaccine at this time. Patient refused Covid booster.

## 2023-06-05 PROBLEM — E66.09 CLASS 2 OBESITY DUE TO EXCESS CALORIES WITHOUT SERIOUS COMORBIDITY WITH BODY MASS INDEX (BMI) OF 36.0 TO 36.9 IN ADULT: Status: ACTIVE | Noted: 2023-01-31

## 2023-06-05 PROBLEM — Z13.1 SCREENING FOR DIABETES MELLITUS (DM): Status: ACTIVE | Noted: 2023-06-05

## 2023-06-06 ENCOUNTER — PATIENT OUTREACH (OUTPATIENT)
Dept: ADMINISTRATIVE | Facility: HOSPITAL | Age: 68
End: 2023-06-06

## 2023-06-06 NOTE — PROGRESS NOTES
06/06/2023   --Chart accessed for: Colorectal Cancer Screening  --Care Gaps addressed: Colorectal Cancer Screening  Outreach made to patient via Chart Review   HM updated with external 09/30/2020 Colonoscopy Report    Health Maintenance Due   Topic Date Due    Foot Exam  Never done    DEXA Scan  Never done    Pneumococcal Vaccines (Age 65+) (2 - PCV) 01/05/2023    COVID-19 Vaccine (6 - Pfizer series) 01/17/2023

## 2023-06-06 NOTE — PROGRESS NOTES
Subjective:       Patient ID: Karie Kaufman is a 67 y.o. female.    Chief Complaint: Hypertension  67-year-old female presents with chronic obesity, chronic hypertension.  Patient overall states that she will like the scopolamine patch.  Patient states that she is going on a cruise.  Will write for the scopolamine patch   Check a BNP and screening test will be A1c.    In regards to obesity recommend low-carbohydrate diet, lifestyle changes and exercising 30 minutes a day.  Hypertension  Pertinent negatives include no chest pain or shortness of breath.   .    Current Medications:    Current Outpatient Medications:     acetaminophen (TYLENOL) 500 MG tablet, Take 500 mg by mouth every 6 (six) hours as needed for Pain., Disp: , Rfl:     aspirin (ECOTRIN) 81 MG EC tablet, Take 81 mg by mouth once daily., Disp: , Rfl:     atorvastatin (LIPITOR) 80 MG tablet, Take 1 tablet (80 mg total) by mouth once daily., Disp: 90 tablet, Rfl: 0    azithromycin (Z-ALECIA) 250 MG tablet, Take 2 tablets by mouth on day 1; Take 1 tablet by mouth on days 2-5, Disp: 6 tablet, Rfl: 1    carvediloL (COREG) 6.25 MG tablet, Take 6.25 mg by mouth 2 (two) times daily., Disp: , Rfl:     clopidogreL (PLAVIX) 75 mg tablet, Take 75 mg by mouth once daily., Disp: , Rfl:     ezetimibe (ZETIA) 10 mg tablet, Take 10 mg by mouth once daily., Disp: , Rfl:     glipiZIDE (GLUCOTROL) 10 MG tablet, Take 1 tablet (10 mg total) by mouth 2 (two) times a day., Disp: 180 tablet, Rfl: 1    guaiFENesin (MUCINEX) 600 mg 12 hr tablet, Take 1 tablet (600 mg total) by mouth 2 (two) times daily., Disp: 30 tablet, Rfl: 0    lisinopriL-hydrochlorothiazide (PRINZIDE,ZESTORETIC) 20-12.5 mg per tablet, Take 1 tablet by mouth 2 (two) times daily., Disp: , Rfl:     meloxicam (MOBIC) 15 MG tablet, Take 1 tablet (15 mg total) by mouth once daily., Disp: 30 tablet, Rfl: 2    metFORMIN (GLUCOPHAGE) 500 MG tablet, Take 1 tablet (500 mg total) by mouth 3 (three) times daily., Disp: 270  "tablet, Rfl: 1    metFORMIN (GLUCOPHAGE) 850 MG tablet, Take 1 tablet (850 mg total) by mouth 3 (three) times daily., Disp: 180 tablet, Rfl: 3    miSOPROStoL (CYTOTEC) 200 MCG Tab, INSERT ONE TABLET VAGINALLY AT BEDTIME WEDNESDAY NIGHT, Disp: , Rfl:     multivit-min/folic acid/biotin (HAIR,SKIN AND NAILS,FA-BIOTIN, ORAL), Take by mouth., Disp: , Rfl:     promethazine (PHENERGAN) 6.25 mg/5 mL syrup, Take 20 mLs (25 mg total) by mouth every 6 (six) hours as needed for Nausea., Disp: 200 mL, Rfl: 0    triamterene-hydrochlorothiazide 37.5-25 mg (MAXZIDE-25) 37.5-25 mg per tablet, Take 1 tablet by mouth once daily., Disp: , Rfl:     vitamin D (VITAMIN D3) 1000 units Tab, Take 1,000 Units by mouth once daily., Disp: , Rfl:     azithromycin (Z-ALECIA) 250 MG tablet, Take 2 tablets by mouth on day 1; Take 1 tablet by mouth on days 2-5 (Patient not taking: Reported on 4/20/2023), Disp: 6 tablet, Rfl: 0    scopolamine (TRANSDERM-SCOP) 1.3-1.5 mg (1 mg over 3 days), Place 1 patch onto the skin Every 3 (three) days., Disp: 5 patch, Rfl: 0           Review of Systems   Constitutional:  Negative for appetite change, fatigue and fever.   Respiratory:  Negative for shortness of breath.    Cardiovascular:  Negative for chest pain.   Gastrointestinal:  Negative for abdominal pain and constipation.   Endocrine: Negative for polydipsia, polyphagia and polyuria.   Genitourinary:  Negative for difficulty urinating, frequency and hot flashes.   Allergic/Immunologic: Negative for environmental allergies.   Neurological:  Negative for dizziness and light-headedness.   Psychiatric/Behavioral:  Negative for agitation.               Vitals:    06/01/23 0912   BP: 127/66   BP Location: Right arm   Patient Position: Sitting   BP Method: Large (Automatic)   Pulse: 60   Resp: 18   Temp: 97.6 °F (36.4 °C)   TempSrc: Temporal   SpO2: 97%   Weight: 107 kg (236 lb)   Height: 5' 7" (1.702 m)        Physical Exam  Vitals and nursing note reviewed. "   Constitutional:       Appearance: Normal appearance. She is obese.   Cardiovascular:      Rate and Rhythm: Normal rate and regular rhythm.      Pulses: Normal pulses.      Heart sounds: Normal heart sounds.   Pulmonary:      Effort: Pulmonary effort is normal.      Breath sounds: Normal breath sounds.   Abdominal:      General: Abdomen is flat. Bowel sounds are normal.      Palpations: Abdomen is soft.   Musculoskeletal:         General: Normal range of motion.   Skin:     General: Skin is warm and dry.   Neurological:      General: No focal deficit present.      Mental Status: She is alert and oriented to person, place, and time. Mental status is at baseline.         Last Labs:     Patient Outreach on 05/02/2023   Component Date Value    BCS Recommendation Exter* 10/31/2022 Repeat mammogram in 1 year        Last Imaging:  X-Ray Foot 2 View Right  Narrative: EXAMINATION:  XR ANKLE 2 VIEW RIGHT; XR FOOT 2 VIEW RIGHT    CLINICAL HISTORY:  Pain in right foot    COMPARISON:  None available    FINDINGS:  No evidence of fracture seen.  The alignment of the joints appears normal.  Mild ankle, midfoot and 1st metatarsophalangeal joint degenerative change is present.  No soft tissue abnormality is seen.  Impression: Mild osteoarthrosis.    Electronically signed by: Andrea Luna  Date:    01/10/2023  Time:    11:24  X-Ray Ankle 2 View Right  Narrative: EXAMINATION:  XR ANKLE 2 VIEW RIGHT; XR FOOT 2 VIEW RIGHT    CLINICAL HISTORY:  Pain in right foot    COMPARISON:  None available    FINDINGS:  No evidence of fracture seen.  The alignment of the joints appears normal.  Mild ankle, midfoot and 1st metatarsophalangeal joint degenerative change is present.  No soft tissue abnormality is seen.  Impression: Mild osteoarthrosis.    Electronically signed by: Andrea Luna  Date:    01/10/2023  Time:    11:24         **Labs and x-rays personally reviewed by me    ** reviewed      Objective:        Assessment:       1.  Primary hypertension  Basic Metabolic Panel      2. Screening for diabetes mellitus (DM)  Hemoglobin A1C      3. DM (diabetes mellitus)  Hemoglobin A1C      4. Class 2 obesity due to excess calories without serious comorbidity with body mass index (BMI) of 36.0 to 36.9 in adult             Plan:         [unfilled]

## 2023-06-30 ENCOUNTER — EXTERNAL CHRONIC CARE MANAGEMENT (OUTPATIENT)
Dept: FAMILY MEDICINE | Facility: CLINIC | Age: 68
End: 2023-06-30
Payer: MEDICARE

## 2023-06-30 PROCEDURE — G0511 PR CHRONIC CARE MGMT, RHC OR FQHC ONLY, 20 MINS OR MORE: ICD-10-PCS | Mod: ,,, | Performed by: INTERNAL MEDICINE

## 2023-06-30 PROCEDURE — G0511 CCM/BHI BY RHC/FQHC 20MIN MO: HCPCS | Mod: ,,, | Performed by: INTERNAL MEDICINE

## 2023-07-20 ENCOUNTER — PATIENT MESSAGE (OUTPATIENT)
Dept: ADMINISTRATIVE | Facility: HOSPITAL | Age: 68
End: 2023-07-20

## 2023-07-31 ENCOUNTER — EXTERNAL CHRONIC CARE MANAGEMENT (OUTPATIENT)
Dept: FAMILY MEDICINE | Facility: CLINIC | Age: 68
End: 2023-07-31
Payer: MEDICARE

## 2023-07-31 PROCEDURE — G0511 CCM/BHI BY RHC/FQHC 20MIN MO: HCPCS | Mod: ,,, | Performed by: INTERNAL MEDICINE

## 2023-07-31 PROCEDURE — G0511 PR CHRONIC CARE MGMT, RHC OR FQHC ONLY, 20 MINS OR MORE: ICD-10-PCS | Mod: ,,, | Performed by: INTERNAL MEDICINE

## 2023-08-11 LAB
LEFT EYE DM RETINOPATHY: POSITIVE
RIGHT EYE DM RETINOPATHY: POSITIVE

## 2023-08-31 ENCOUNTER — EXTERNAL CHRONIC CARE MANAGEMENT (OUTPATIENT)
Dept: FAMILY MEDICINE | Facility: CLINIC | Age: 68
End: 2023-08-31
Payer: MEDICARE

## 2023-08-31 PROCEDURE — G0511 CCM/BHI BY RHC/FQHC 20MIN MO: HCPCS | Mod: ,,, | Performed by: INTERNAL MEDICINE

## 2023-08-31 PROCEDURE — G0511 PR CHRONIC CARE MGMT, RHC OR FQHC ONLY, 20 MINS OR MORE: ICD-10-PCS | Mod: ,,, | Performed by: INTERNAL MEDICINE

## 2023-09-04 PROBLEM — Z13.1 SCREENING FOR DIABETES MELLITUS (DM): Status: RESOLVED | Noted: 2023-06-05 | Resolved: 2023-09-04

## 2023-09-06 ENCOUNTER — OFFICE VISIT (OUTPATIENT)
Dept: FAMILY MEDICINE | Facility: CLINIC | Age: 68
End: 2023-09-06
Payer: MEDICARE

## 2023-09-06 VITALS
WEIGHT: 232 LBS | HEIGHT: 67 IN | OXYGEN SATURATION: 95 % | RESPIRATION RATE: 17 BRPM | HEART RATE: 60 BPM | BODY MASS INDEX: 36.41 KG/M2 | DIASTOLIC BLOOD PRESSURE: 60 MMHG | TEMPERATURE: 98 F | SYSTOLIC BLOOD PRESSURE: 139 MMHG

## 2023-09-06 DIAGNOSIS — E78.5 DYSLIPIDEMIA: ICD-10-CM

## 2023-09-06 DIAGNOSIS — E11.9 TYPE 2 DIABETES MELLITUS WITHOUT COMPLICATION, WITHOUT LONG-TERM CURRENT USE OF INSULIN: Primary | ICD-10-CM

## 2023-09-06 LAB
ANION GAP SERPL CALCULATED.3IONS-SCNC: 7 MMOL/L (ref 7–16)
BUN SERPL-MCNC: 15 MG/DL (ref 7–18)
BUN/CREAT SERPL: 14 (ref 6–20)
CALCIUM SERPL-MCNC: 9.9 MG/DL (ref 8.5–10.1)
CHLORIDE SERPL-SCNC: 111 MMOL/L (ref 98–107)
CO2 SERPL-SCNC: 27 MMOL/L (ref 21–32)
CREAT SERPL-MCNC: 1.08 MG/DL (ref 0.55–1.02)
EGFR (NO RACE VARIABLE) (RUSH/TITUS): 57 ML/MIN/1.73M2
EST. AVERAGE GLUCOSE BLD GHB EST-MCNC: 97 MG/DL
GLUCOSE SERPL-MCNC: 84 MG/DL (ref 74–106)
HBA1C MFR BLD HPLC: 5.5 % (ref 4.5–6.6)
POTASSIUM SERPL-SCNC: 4.8 MMOL/L (ref 3.5–5.1)
SODIUM SERPL-SCNC: 140 MMOL/L (ref 136–145)

## 2023-09-06 PROCEDURE — 3288F FALL RISK ASSESSMENT DOCD: CPT | Mod: ,,, | Performed by: INTERNAL MEDICINE

## 2023-09-06 PROCEDURE — 1159F MED LIST DOCD IN RCRD: CPT | Mod: ,,, | Performed by: INTERNAL MEDICINE

## 2023-09-06 PROCEDURE — 4010F PR ACE/ARB THEARPY RXD/TAKEN: ICD-10-PCS | Mod: ,,, | Performed by: INTERNAL MEDICINE

## 2023-09-06 PROCEDURE — 4010F ACE/ARB THERAPY RXD/TAKEN: CPT | Mod: ,,, | Performed by: INTERNAL MEDICINE

## 2023-09-06 PROCEDURE — 80048 BASIC METABOLIC PANEL: ICD-10-PCS | Mod: ,,, | Performed by: CLINICAL MEDICAL LABORATORY

## 2023-09-06 PROCEDURE — 1101F PT FALLS ASSESS-DOCD LE1/YR: CPT | Mod: ,,, | Performed by: INTERNAL MEDICINE

## 2023-09-06 PROCEDURE — 3288F PR FALLS RISK ASSESSMENT DOCUMENTED: ICD-10-PCS | Mod: ,,, | Performed by: INTERNAL MEDICINE

## 2023-09-06 PROCEDURE — 3008F PR BODY MASS INDEX (BMI) DOCUMENTED: ICD-10-PCS | Mod: ,,, | Performed by: INTERNAL MEDICINE

## 2023-09-06 PROCEDURE — 83036 HEMOGLOBIN GLYCOSYLATED A1C: CPT | Mod: ,,, | Performed by: CLINICAL MEDICAL LABORATORY

## 2023-09-06 PROCEDURE — 3044F HG A1C LEVEL LT 7.0%: CPT | Mod: ,,, | Performed by: INTERNAL MEDICINE

## 2023-09-06 PROCEDURE — 3066F NEPHROPATHY DOC TX: CPT | Mod: ,,, | Performed by: INTERNAL MEDICINE

## 2023-09-06 PROCEDURE — 1159F PR MEDICATION LIST DOCUMENTED IN MEDICAL RECORD: ICD-10-PCS | Mod: ,,, | Performed by: INTERNAL MEDICINE

## 2023-09-06 PROCEDURE — 3078F DIAST BP <80 MM HG: CPT | Mod: ,,, | Performed by: INTERNAL MEDICINE

## 2023-09-06 PROCEDURE — 3061F PR NEG MICROALBUMINURIA RESULT DOCUMENTED/REVIEW: ICD-10-PCS | Mod: ,,, | Performed by: INTERNAL MEDICINE

## 2023-09-06 PROCEDURE — 1126F AMNT PAIN NOTED NONE PRSNT: CPT | Mod: ,,, | Performed by: INTERNAL MEDICINE

## 2023-09-06 PROCEDURE — 99214 PR OFFICE/OUTPT VISIT, EST, LEVL IV, 30-39 MIN: ICD-10-PCS | Mod: ,,, | Performed by: INTERNAL MEDICINE

## 2023-09-06 PROCEDURE — 3044F PR MOST RECENT HEMOGLOBIN A1C LEVEL <7.0%: ICD-10-PCS | Mod: ,,, | Performed by: INTERNAL MEDICINE

## 2023-09-06 PROCEDURE — 1101F PR PT FALLS ASSESS DOC 0-1 FALLS W/OUT INJ PAST YR: ICD-10-PCS | Mod: ,,, | Performed by: INTERNAL MEDICINE

## 2023-09-06 PROCEDURE — 99214 OFFICE O/P EST MOD 30 MIN: CPT | Mod: ,,, | Performed by: INTERNAL MEDICINE

## 2023-09-06 PROCEDURE — 3008F BODY MASS INDEX DOCD: CPT | Mod: ,,, | Performed by: INTERNAL MEDICINE

## 2023-09-06 PROCEDURE — 3075F SYST BP GE 130 - 139MM HG: CPT | Mod: ,,, | Performed by: INTERNAL MEDICINE

## 2023-09-06 PROCEDURE — 80048 BASIC METABOLIC PNL TOTAL CA: CPT | Mod: ,,, | Performed by: CLINICAL MEDICAL LABORATORY

## 2023-09-06 PROCEDURE — 3075F PR MOST RECENT SYSTOLIC BLOOD PRESS GE 130-139MM HG: ICD-10-PCS | Mod: ,,, | Performed by: INTERNAL MEDICINE

## 2023-09-06 PROCEDURE — 83036 HEMOGLOBIN A1C: ICD-10-PCS | Mod: ,,, | Performed by: CLINICAL MEDICAL LABORATORY

## 2023-09-06 PROCEDURE — 1126F PR PAIN SEVERITY QUANTIFIED, NO PAIN PRESENT: ICD-10-PCS | Mod: ,,, | Performed by: INTERNAL MEDICINE

## 2023-09-06 PROCEDURE — 1160F PR REVIEW ALL MEDS BY PRESCRIBER/CLIN PHARMACIST DOCUMENTED: ICD-10-PCS | Mod: ,,, | Performed by: INTERNAL MEDICINE

## 2023-09-06 PROCEDURE — 1160F RVW MEDS BY RX/DR IN RCRD: CPT | Mod: ,,, | Performed by: INTERNAL MEDICINE

## 2023-09-06 PROCEDURE — 3078F PR MOST RECENT DIASTOLIC BLOOD PRESSURE < 80 MM HG: ICD-10-PCS | Mod: ,,, | Performed by: INTERNAL MEDICINE

## 2023-09-06 PROCEDURE — 3066F PR DOCUMENTATION OF TREATMENT FOR NEPHROPATHY: ICD-10-PCS | Mod: ,,, | Performed by: INTERNAL MEDICINE

## 2023-09-06 PROCEDURE — 3061F NEG MICROALBUMINURIA REV: CPT | Mod: ,,, | Performed by: INTERNAL MEDICINE

## 2023-09-06 RX ORDER — GLIPIZIDE 10 MG/1
10 TABLET ORAL 2 TIMES DAILY
Qty: 180 TABLET | Refills: 1 | Status: SHIPPED | OUTPATIENT
Start: 2023-09-06 | End: 2023-12-14 | Stop reason: SDUPTHER

## 2023-09-06 RX ORDER — MELOXICAM 15 MG/1
15 TABLET ORAL DAILY
Qty: 30 TABLET | Refills: 2 | Status: SHIPPED | OUTPATIENT
Start: 2023-09-06 | End: 2023-12-14 | Stop reason: SDUPTHER

## 2023-09-06 RX ORDER — LISINOPRIL AND HYDROCHLOROTHIAZIDE 12.5; 2 MG/1; MG/1
1 TABLET ORAL 2 TIMES DAILY
Qty: 90 TABLET | Refills: 1 | Status: SHIPPED | OUTPATIENT
Start: 2023-09-06

## 2023-09-06 RX ORDER — ATORVASTATIN CALCIUM 80 MG/1
80 TABLET, FILM COATED ORAL DAILY
Qty: 90 TABLET | Refills: 0 | Status: SHIPPED | OUTPATIENT
Start: 2023-09-06

## 2023-09-06 RX ORDER — METFORMIN HYDROCHLORIDE 850 MG/1
850 TABLET ORAL 3 TIMES DAILY
Qty: 180 TABLET | Refills: 3 | Status: SHIPPED | OUTPATIENT
Start: 2023-09-06 | End: 2023-12-14 | Stop reason: SDUPTHER

## 2023-09-06 RX ORDER — TRIAMTERENE/HYDROCHLOROTHIAZID 37.5-25 MG
1 TABLET ORAL DAILY
Qty: 90 TABLET | Refills: 1 | Status: SHIPPED | OUTPATIENT
Start: 2023-09-06

## 2023-09-11 NOTE — PROGRESS NOTES
Subjective:       Patient ID: Karie Kaufman is a 67 y.o. female.    Chief Complaint: Hypertension  Chronic type B chronic dyslipidemia patient voiced no complaints except what is medicines refilled   Refill milligrams 1 p.o. q.day refill Glucotrol 10 milligrams 1 b.i.d..    Will also check a A1c and BNP today.  Hypertension  Pertinent negatives include no chest pain or shortness of breath.     .    Current Medications:    Current Outpatient Medications:     acetaminophen (TYLENOL) 500 MG tablet, Take 500 mg by mouth every 6 (six) hours as needed for Pain., Disp: , Rfl:     aspirin (ECOTRIN) 81 MG EC tablet, Take 81 mg by mouth once daily., Disp: , Rfl:     carvediloL (COREG) 6.25 MG tablet, Take 6.25 mg by mouth 2 (two) times daily., Disp: , Rfl:     clopidogreL (PLAVIX) 75 mg tablet, Take 75 mg by mouth once daily., Disp: , Rfl:     ezetimibe (ZETIA) 10 mg tablet, Take 10 mg by mouth once daily., Disp: , Rfl:     guaiFENesin (MUCINEX) 600 mg 12 hr tablet, Take 1 tablet (600 mg total) by mouth 2 (two) times daily., Disp: 30 tablet, Rfl: 0    metFORMIN (GLUCOPHAGE) 500 MG tablet, Take 1 tablet (500 mg total) by mouth 3 (three) times daily., Disp: 270 tablet, Rfl: 1    miSOPROStoL (CYTOTEC) 200 MCG Tab, INSERT ONE TABLET VAGINALLY AT BEDTIME WEDNESDAY NIGHT, Disp: , Rfl:     multivit-min/folic acid/biotin (HAIR,SKIN AND NAILS,FA-BIOTIN, ORAL), Take by mouth., Disp: , Rfl:     promethazine (PHENERGAN) 6.25 mg/5 mL syrup, Take 20 mLs (25 mg total) by mouth every 6 (six) hours as needed for Nausea., Disp: 200 mL, Rfl: 0    scopolamine (TRANSDERM-SCOP) 1.3-1.5 mg (1 mg over 3 days), Place 1 patch onto the skin Every 3 (three) days., Disp: 5 patch, Rfl: 0    vitamin D (VITAMIN D3) 1000 units Tab, Take 1,000 Units by mouth once daily., Disp: , Rfl:     atorvastatin (LIPITOR) 80 MG tablet, Take 1 tablet (80 mg total) by mouth once daily., Disp: 90 tablet, Rfl: 0    azithromycin (Z-ALECIA) 250 MG tablet, Take 2 tablets by  "mouth on day 1; Take 1 tablet by mouth on days 2-5 (Patient not taking: Reported on 4/20/2023), Disp: 6 tablet, Rfl: 0    azithromycin (Z-ALECIA) 250 MG tablet, Take 2 tablets by mouth on day 1; Take 1 tablet by mouth on days 2-5 (Patient not taking: Reported on 9/6/2023), Disp: 6 tablet, Rfl: 1    glipiZIDE (GLUCOTROL) 10 MG tablet, Take 1 tablet (10 mg total) by mouth 2 (two) times a day., Disp: 180 tablet, Rfl: 1    lisinopriL-hydrochlorothiazide (PRINZIDE,ZESTORETIC) 20-12.5 mg per tablet, Take 1 tablet by mouth 2 (two) times daily., Disp: 90 tablet, Rfl: 1    meloxicam (MOBIC) 15 MG tablet, Take 1 tablet (15 mg total) by mouth once daily., Disp: 30 tablet, Rfl: 2    metFORMIN (GLUCOPHAGE) 850 MG tablet, Take 1 tablet (850 mg total) by mouth 3 (three) times daily., Disp: 180 tablet, Rfl: 3    triamterene-hydrochlorothiazide 37.5-25 mg (MAXZIDE-25) 37.5-25 mg per tablet, Take 1 tablet by mouth once daily., Disp: 90 tablet, Rfl: 1           Review of Systems   Constitutional:  Negative for appetite change, fatigue and fever.   Respiratory:  Negative for shortness of breath.    Cardiovascular:  Negative for chest pain.   Gastrointestinal:  Negative for abdominal pain and constipation.   Endocrine: Negative for polydipsia, polyphagia and polyuria.   Genitourinary:  Negative for difficulty urinating, frequency and hot flashes.   Allergic/Immunologic: Negative for environmental allergies.   Neurological:  Negative for dizziness and light-headedness.   Psychiatric/Behavioral:  Negative for agitation.                 Vitals:    09/06/23 0845   BP: 139/60   BP Location: Right arm   Patient Position: Sitting   BP Method: Large (Automatic)   Pulse: 60   Resp: 17   Temp: 97.7 °F (36.5 °C)   TempSrc: Temporal   SpO2: 95%   Weight: 105.2 kg (232 lb)   Height: 5' 7" (1.702 m)        Physical Exam  Vitals and nursing note reviewed.   Constitutional:       Appearance: Normal appearance.   Cardiovascular:      Rate and Rhythm: " Normal rate and regular rhythm.      Pulses: Normal pulses.      Heart sounds: Normal heart sounds.   Pulmonary:      Effort: Pulmonary effort is normal.      Breath sounds: Normal breath sounds.   Abdominal:      General: Abdomen is flat. Bowel sounds are normal.      Palpations: Abdomen is soft.   Musculoskeletal:         General: Normal range of motion.   Skin:     General: Skin is warm and dry.   Neurological:      General: No focal deficit present.      Mental Status: She is alert and oriented to person, place, and time. Mental status is at baseline.           Last Labs:     Office Visit on 09/06/2023   Component Date Value    Sodium 09/06/2023 140     Potassium 09/06/2023 4.8     Chloride 09/06/2023 111 (H)     CO2 09/06/2023 27     Anion Gap 09/06/2023 7     Glucose 09/06/2023 84     BUN 09/06/2023 15     Creatinine 09/06/2023 1.08 (H)     BUN/Creatinine Ratio 09/06/2023 14     Calcium 09/06/2023 9.9     eGFR 09/06/2023 57 (L)     Hemoglobin A1C 09/06/2023 5.5     Estimated Average Glucose 09/06/2023 97        Last Imaging:  X-Ray Foot 2 View Right  Narrative: EXAMINATION:  XR ANKLE 2 VIEW RIGHT; XR FOOT 2 VIEW RIGHT    CLINICAL HISTORY:  Pain in right foot    COMPARISON:  None available    FINDINGS:  No evidence of fracture seen.  The alignment of the joints appears normal.  Mild ankle, midfoot and 1st metatarsophalangeal joint degenerative change is present.  No soft tissue abnormality is seen.  Impression: Mild osteoarthrosis.    Electronically signed by: Andrea Luna  Date:    01/10/2023  Time:    11:24  X-Ray Ankle 2 View Right  Narrative: EXAMINATION:  XR ANKLE 2 VIEW RIGHT; XR FOOT 2 VIEW RIGHT    CLINICAL HISTORY:  Pain in right foot    COMPARISON:  None available    FINDINGS:  No evidence of fracture seen.  The alignment of the joints appears normal.  Mild ankle, midfoot and 1st metatarsophalangeal joint degenerative change is present.  No soft tissue abnormality is seen.  Impression: Mild  osteoarthrosis.    Electronically signed by: Andrea Luna  Date:    01/10/2023  Time:    11:24         **Labs and x-rays personally reviewed by me    ** reviewed      Objective:        Assessment:       1. Type 2 diabetes mellitus without complication, without long-term current use of insulin  Basic Metabolic Panel    Hemoglobin A1C    Basic Metabolic Panel    Hemoglobin A1C      2. Dyslipidemia             Plan:         [unfilled]

## 2023-09-30 ENCOUNTER — EXTERNAL CHRONIC CARE MANAGEMENT (OUTPATIENT)
Dept: FAMILY MEDICINE | Facility: CLINIC | Age: 68
End: 2023-09-30
Payer: MEDICARE

## 2023-09-30 PROCEDURE — G0511 PR CHRONIC CARE MGMT, RHC OR FQHC ONLY, 20 MINS OR MORE: ICD-10-PCS | Mod: ,,, | Performed by: INTERNAL MEDICINE

## 2023-09-30 PROCEDURE — G0511 CCM/BHI BY RHC/FQHC 20MIN MO: HCPCS | Mod: ,,, | Performed by: INTERNAL MEDICINE

## 2023-10-31 ENCOUNTER — EXTERNAL CHRONIC CARE MANAGEMENT (OUTPATIENT)
Dept: FAMILY MEDICINE | Facility: CLINIC | Age: 68
End: 2023-10-31
Payer: MEDICARE

## 2023-10-31 PROCEDURE — G0511 CCM/BHI BY RHC/FQHC 20MIN MO: HCPCS | Mod: ,,, | Performed by: INTERNAL MEDICINE

## 2023-10-31 PROCEDURE — G0511 PR CHRONIC CARE MGMT, RHC OR FQHC ONLY, 20 MINS OR MORE: ICD-10-PCS | Mod: ,,, | Performed by: INTERNAL MEDICINE

## 2023-11-30 ENCOUNTER — EXTERNAL CHRONIC CARE MANAGEMENT (OUTPATIENT)
Dept: FAMILY MEDICINE | Facility: CLINIC | Age: 68
End: 2023-11-30
Payer: MEDICARE

## 2023-11-30 PROCEDURE — G0511 PR CHRONIC CARE MGMT, RHC OR FQHC ONLY, 20 MINS OR MORE: ICD-10-PCS | Mod: ,,, | Performed by: INTERNAL MEDICINE

## 2023-11-30 PROCEDURE — G0511 CCM/BHI BY RHC/FQHC 20MIN MO: HCPCS | Mod: ,,, | Performed by: INTERNAL MEDICINE

## 2023-12-04 NOTE — PROGRESS NOTES
"  Karie Kaufman presented for a  Medicare AWV and comprehensive Health Risk Assessment today. She is an established pt of Dr. Gabriel Mckenzie.    The following components were reviewed and updated:    Medical history  Family History  Social history  Allergies and Current Medications  Health Risk Assessment  Health Maintenance  Care Team         ** See Completed Assessments for Annual Wellness Visit within the encounter summary.**         The following assessments were completed:  Living Situation  CAGE  Depression Screening  Timed Get Up and Go  Whisper Test  Cognitive Function Screening  Nutrition Screening  ADL Screening  PAQ Screening        Vitals:    12/11/23 0931   BP: 102/60   BP Location: Left arm   Patient Position: Sitting   Pulse: 62   Resp: 16   Temp: 98.2 °F (36.8 °C)   TempSrc: Oral   SpO2: 98%   Weight: 106.6 kg (235 lb)   Height: 5' 7" (1.702 m)     Body mass index is 36.81 kg/m².  Physical Exam  Constitutional:       General: She is not in acute distress.     Appearance: Normal appearance. She is obese.   HENT:      Head: Normocephalic.      Mouth/Throat:      Mouth: Mucous membranes are moist.   Cardiovascular:      Rate and Rhythm: Normal rate and regular rhythm.      Pulses: Normal pulses.      Heart sounds: Normal heart sounds. No murmur heard.  Pulmonary:      Effort: Pulmonary effort is normal. No respiratory distress.      Breath sounds: Normal breath sounds.   Abdominal:      Palpations: Abdomen is soft.      Tenderness: There is no abdominal tenderness.   Musculoskeletal:         General: Normal range of motion.      Cervical back: Neck supple.   Skin:     General: Skin is warm and dry.   Neurological:      Mental Status: She is alert and oriented to person, place, and time.   Psychiatric:         Mood and Affect: Mood normal.         Behavior: Behavior normal.           Diagnoses and health risks identified today and associated recommendations/orders:    1. Encounter for initial annual wellness " visit (AWV) in Medicare patient  Screenings performed, as noted above. Personal preventative testing needs reviewed.  Return for AWV in 12 months or thereafter       2. Primary hypertension  Chronic - Stable  B/P 102/60 today  Continue carvedilol 6.25 mg po BID  Continue lisinopril-HCTZ 20-12.5 mg po daily  Continue heart healthy low sodium diet  Followed by PCP     3. Dyslipidemia  Chronic- Stable  Trig 77, Chol 157, HDL 59, LDL 83  Continue atorvastatin 80 mg po daily  Continue heart healthy diet  Followed by PCP    4. Type 2 diabetes mellitus without complication, without long-term current use of insulin  Chronic - Stable  Recent HA1C 5.5. Microalbumin/creatinine ration normal.  Diabetic eye exam and foot exam up to date  Continue glipizide 10 mg po BID  Continue metformin 850 mg po TID  Followed by PCP    5. Osteoarthritis, unspecified osteoarthritis type, unspecified site  Chronic - Stable  Continue meloxicam 15 mg po daily  Followed by PCP    6. Athscl heart disease of native coronary artery w/o ang pctrs  Chronic - Stable  Continue carvedilol 6.25 mg po BID  Continue clopidogrel 75 mg po daily  Followed by PCP    7. BMI 36.0-36.9,adult  Recommend weight loss  Exercise such as walking to tolerance   Adhere to low sodium low concentrated sweet diet  Followed by PCP    8. Obesity, unspecified classification, unspecified obesity type, unspecified whether serious comorbidity present  Recommend weight loss  Exercise such as walking to tolerance   Adhere to low sodium low concentrated sweet diet  Followed by PCP    9. Need for vaccination  - (In Office Administered) Pneumococcal Conjugate Vaccine (20 Valent) (IM) (Preferred)      Provided Karie with a 5-10 year written screening schedule and personal prevention plan. Recommendations were developed using the USPSTF age appropriate recommendations. Education, counseling, and referrals were provided as needed. After Visit Summary printed and given to patient which  includes a list of additional screenings\tests needed.    Follow up in about 1 year (around 12/11/2024).    BEVERLY Yun    RSV  vaccine - VIS (10/19/23) given with instructions to take at pharmacy, Covid vaccine - Spikevax 12 - taken 9/29/23, Influenza vaccine - 9/29/23, Eye exam - JEMMA faxed to [facility],Mammogram - scheduled for 12/27/23 at 10:20 AM at [facility],BMD - ordered 1/10/23 - scheduled with appointment given, Pelvic/Pap exam - declined, Pneumonia vaccine - ordered and given this visit.    I offered to discuss advanced care planning, including how to pick a person who would make decisions for you if you were unable to make them for yourself, called a health care power of , and what kind of decisions you might make such as use of life sustaining treatments such as ventilators and tube feeding when faced with a life limiting illness recorded on a living will that they will need to know. (How you want to be cared for as you near the end of your natural life)     X Patient is interested in learning more about how to make advanced directives.  I provided them paperwork and offered to discuss this with them.

## 2023-12-11 ENCOUNTER — OFFICE VISIT (OUTPATIENT)
Dept: FAMILY MEDICINE | Facility: CLINIC | Age: 68
End: 2023-12-11
Payer: MEDICARE

## 2023-12-11 VITALS
HEIGHT: 67 IN | BODY MASS INDEX: 36.88 KG/M2 | HEART RATE: 62 BPM | DIASTOLIC BLOOD PRESSURE: 60 MMHG | SYSTOLIC BLOOD PRESSURE: 102 MMHG | TEMPERATURE: 98 F | WEIGHT: 235 LBS | RESPIRATION RATE: 16 BRPM | OXYGEN SATURATION: 98 %

## 2023-12-11 DIAGNOSIS — E78.5 DYSLIPIDEMIA: ICD-10-CM

## 2023-12-11 DIAGNOSIS — I10 PRIMARY HYPERTENSION: ICD-10-CM

## 2023-12-11 DIAGNOSIS — E66.9 OBESITY, UNSPECIFIED CLASSIFICATION, UNSPECIFIED OBESITY TYPE, UNSPECIFIED WHETHER SERIOUS COMORBIDITY PRESENT: ICD-10-CM

## 2023-12-11 DIAGNOSIS — Z00.00 ENCOUNTER FOR INITIAL ANNUAL WELLNESS VISIT (AWV) IN MEDICARE PATIENT: Primary | ICD-10-CM

## 2023-12-11 DIAGNOSIS — I25.10 ATHSCL HEART DISEASE OF NATIVE CORONARY ARTERY W/O ANG PCTRS: ICD-10-CM

## 2023-12-11 DIAGNOSIS — E11.9 TYPE 2 DIABETES MELLITUS WITHOUT COMPLICATION, WITHOUT LONG-TERM CURRENT USE OF INSULIN: ICD-10-CM

## 2023-12-11 DIAGNOSIS — M19.90 OSTEOARTHRITIS, UNSPECIFIED OSTEOARTHRITIS TYPE, UNSPECIFIED SITE: ICD-10-CM

## 2023-12-11 DIAGNOSIS — Z23 NEED FOR VACCINATION: ICD-10-CM

## 2023-12-11 PROCEDURE — 3044F PR MOST RECENT HEMOGLOBIN A1C LEVEL <7.0%: ICD-10-PCS | Mod: ,,, | Performed by: NURSE PRACTITIONER

## 2023-12-11 PROCEDURE — 90677 PNEUMOCOCCAL CONJUGATE VACCINE 20-VALENT: ICD-10-PCS | Mod: ,,, | Performed by: NURSE PRACTITIONER

## 2023-12-11 PROCEDURE — G0439 PPPS, SUBSEQ VISIT: HCPCS | Mod: ,,, | Performed by: NURSE PRACTITIONER

## 2023-12-11 PROCEDURE — 1159F MED LIST DOCD IN RCRD: CPT | Mod: ,,, | Performed by: NURSE PRACTITIONER

## 2023-12-11 PROCEDURE — 1170F PR FUNCTIONAL STATUS ASSESSED: ICD-10-PCS | Mod: ,,, | Performed by: NURSE PRACTITIONER

## 2023-12-11 PROCEDURE — 3061F PR NEG MICROALBUMINURIA RESULT DOCUMENTED/REVIEW: ICD-10-PCS | Mod: ,,, | Performed by: NURSE PRACTITIONER

## 2023-12-11 PROCEDURE — 3066F PR DOCUMENTATION OF TREATMENT FOR NEPHROPATHY: ICD-10-PCS | Mod: ,,, | Performed by: NURSE PRACTITIONER

## 2023-12-11 PROCEDURE — 1126F PR PAIN SEVERITY QUANTIFIED, NO PAIN PRESENT: ICD-10-PCS | Mod: ,,, | Performed by: NURSE PRACTITIONER

## 2023-12-11 PROCEDURE — 3078F PR MOST RECENT DIASTOLIC BLOOD PRESSURE < 80 MM HG: ICD-10-PCS | Mod: ,,, | Performed by: NURSE PRACTITIONER

## 2023-12-11 PROCEDURE — 3074F PR MOST RECENT SYSTOLIC BLOOD PRESSURE < 130 MM HG: ICD-10-PCS | Mod: ,,, | Performed by: NURSE PRACTITIONER

## 2023-12-11 PROCEDURE — G0009 PNEUMOCOCCAL CONJUGATE VACCINE 20-VALENT: ICD-10-PCS | Mod: ,,, | Performed by: NURSE PRACTITIONER

## 2023-12-11 PROCEDURE — G0009 ADMIN PNEUMOCOCCAL VACCINE: HCPCS | Mod: ,,, | Performed by: NURSE PRACTITIONER

## 2023-12-11 PROCEDURE — 3288F PR FALLS RISK ASSESSMENT DOCUMENTED: ICD-10-PCS | Mod: ,,, | Performed by: NURSE PRACTITIONER

## 2023-12-11 PROCEDURE — 1160F PR REVIEW ALL MEDS BY PRESCRIBER/CLIN PHARMACIST DOCUMENTED: ICD-10-PCS | Mod: ,,, | Performed by: NURSE PRACTITIONER

## 2023-12-11 PROCEDURE — 1101F PR PT FALLS ASSESS DOC 0-1 FALLS W/OUT INJ PAST YR: ICD-10-PCS | Mod: ,,, | Performed by: NURSE PRACTITIONER

## 2023-12-11 PROCEDURE — 3074F SYST BP LT 130 MM HG: CPT | Mod: ,,, | Performed by: NURSE PRACTITIONER

## 2023-12-11 PROCEDURE — 3288F FALL RISK ASSESSMENT DOCD: CPT | Mod: ,,, | Performed by: NURSE PRACTITIONER

## 2023-12-11 PROCEDURE — 1170F FXNL STATUS ASSESSED: CPT | Mod: ,,, | Performed by: NURSE PRACTITIONER

## 2023-12-11 PROCEDURE — 1160F RVW MEDS BY RX/DR IN RCRD: CPT | Mod: ,,, | Performed by: NURSE PRACTITIONER

## 2023-12-11 PROCEDURE — 3061F NEG MICROALBUMINURIA REV: CPT | Mod: ,,, | Performed by: NURSE PRACTITIONER

## 2023-12-11 PROCEDURE — 3078F DIAST BP <80 MM HG: CPT | Mod: ,,, | Performed by: NURSE PRACTITIONER

## 2023-12-11 PROCEDURE — 4010F ACE/ARB THERAPY RXD/TAKEN: CPT | Mod: ,,, | Performed by: NURSE PRACTITIONER

## 2023-12-11 PROCEDURE — 3044F HG A1C LEVEL LT 7.0%: CPT | Mod: ,,, | Performed by: NURSE PRACTITIONER

## 2023-12-11 PROCEDURE — 90677 PCV20 VACCINE IM: CPT | Mod: ,,, | Performed by: NURSE PRACTITIONER

## 2023-12-11 PROCEDURE — 4010F PR ACE/ARB THEARPY RXD/TAKEN: ICD-10-PCS | Mod: ,,, | Performed by: NURSE PRACTITIONER

## 2023-12-11 PROCEDURE — G0439 PR MEDICARE ANNUAL WELLNESS SUBSEQUENT VISIT: ICD-10-PCS | Mod: ,,, | Performed by: NURSE PRACTITIONER

## 2023-12-11 PROCEDURE — 1126F AMNT PAIN NOTED NONE PRSNT: CPT | Mod: ,,, | Performed by: NURSE PRACTITIONER

## 2023-12-11 PROCEDURE — 3066F NEPHROPATHY DOC TX: CPT | Mod: ,,, | Performed by: NURSE PRACTITIONER

## 2023-12-11 PROCEDURE — 1159F PR MEDICATION LIST DOCUMENTED IN MEDICAL RECORD: ICD-10-PCS | Mod: ,,, | Performed by: NURSE PRACTITIONER

## 2023-12-11 PROCEDURE — 1101F PT FALLS ASSESS-DOCD LE1/YR: CPT | Mod: ,,, | Performed by: NURSE PRACTITIONER

## 2023-12-11 NOTE — LETTER
AUTHORIZATION FOR RELEASE OF   CONFIDENTIAL INFORMATION    Dear [facility],    We are seeing Karie Kaufman, date of birth 1955, in the clinic at [facility]. Gabriel Mckenzie MD is the patient's PCP. Karie Kaufman has an outstanding lab/procedure at the time we reviewed her chart. In order to help keep her health information updated, she has authorized us to request the following medical record(s):        (  )  MAMMOGRAM                                      (  )  COLONOSCOPY      (  )  PAP SMEAR                                          (  )  OUTSIDE LAB RESULTS     (  )  DEXA SCAN                                          ( x )  EYE EXAM            (  )  FOOT EXAM                                          (  )  ENTIRE RECORD     (  )  OUTSIDE IMMUNIZATIONS                 (  )  _______________         Please fax records to [facility], [place], John Anthony, MD, [phone]     If you have any questions, please contact  at [phone].           Patient Name: Karie Kaufman  : 1955  Patient Phone #: [phone]

## 2023-12-11 NOTE — PATIENT INSTRUCTIONS
Counseling and Referral of Other Preventative  (Italic type indicates deductible and co-insurance are waived)    Patient Name: Karie Kaufman  Today's Date: 12/11/2023    Health Maintenance       Date Due Completion Date    Foot Exam Never done ---    DEXA Scan Never done ---    RSV Vaccine (Age 60+ and Pregnant patients) (1 - 1-dose 60+ series) Never done ---    Pneumococcal Vaccines (Age 65+) (2 - PCV) 01/05/2023 1/5/2022    Eye Exam 08/15/2023 8/15/2022    Mammogram 10/31/2023 10/31/2022    COVID-19 Vaccine (7 - 2023-24 season) 11/24/2023 9/29/2023    Diabetes Urine Screening 01/10/2024 1/10/2023    Hemoglobin A1c 03/06/2024 9/6/2023    Lipid Panel 05/01/2024 5/1/2023    High Dose Statin 09/06/2024 9/6/2023    Aspirin/Antiplatelet Therapy 09/11/2024 9/11/2023    Colorectal Cancer Screening 09/30/2025 9/30/2020    TETANUS VACCINE 08/18/2032 8/18/2022        No orders of the defined types were placed in this encounter.    The following information is provided to all patients.  This information is to help you find resources for any of the problems found today that may be affecting your health:                Living healthy guide: www.[facility].[place].gov      Understanding Diabetes: www.diabetes.org      Eating healthy: www.cdc.gov/healthyweight      CDC home safety checklist: www.cdc.gov/steadi/patient.html      Agency on Aging: www.goea.[place].[facility]      Alcoholics anonymous (AA): www.aa.org      Physical Activity: www.ebenezer.nih.gov/sx0eywn      Tobacco use: www.quitwithusla.org

## 2023-12-12 ENCOUNTER — PATIENT OUTREACH (OUTPATIENT)
Dept: ADMINISTRATIVE | Facility: HOSPITAL | Age: 68
End: 2023-12-12

## 2023-12-14 ENCOUNTER — OFFICE VISIT (OUTPATIENT)
Dept: FAMILY MEDICINE | Facility: CLINIC | Age: 68
End: 2023-12-14
Payer: MEDICARE

## 2023-12-14 VITALS
OXYGEN SATURATION: 96 % | HEIGHT: 67 IN | SYSTOLIC BLOOD PRESSURE: 137 MMHG | DIASTOLIC BLOOD PRESSURE: 82 MMHG | BODY MASS INDEX: 36.75 KG/M2 | RESPIRATION RATE: 18 BRPM | HEART RATE: 62 BPM | WEIGHT: 234.13 LBS | TEMPERATURE: 98 F

## 2023-12-14 DIAGNOSIS — I10 PRIMARY HYPERTENSION: Primary | ICD-10-CM

## 2023-12-14 DIAGNOSIS — E11.9 DM (DIABETES MELLITUS): ICD-10-CM

## 2023-12-14 DIAGNOSIS — E66.9 OBESITY, UNSPECIFIED CLASSIFICATION, UNSPECIFIED OBESITY TYPE, UNSPECIFIED WHETHER SERIOUS COMORBIDITY PRESENT: ICD-10-CM

## 2023-12-14 LAB
ANION GAP SERPL CALCULATED.3IONS-SCNC: 9 MMOL/L (ref 7–16)
BUN SERPL-MCNC: 22 MG/DL (ref 7–18)
BUN/CREAT SERPL: 18 (ref 6–20)
CALCIUM SERPL-MCNC: 10.1 MG/DL (ref 8.5–10.1)
CHLORIDE SERPL-SCNC: 109 MMOL/L (ref 98–107)
CHOLEST SERPL-MCNC: 156 MG/DL (ref 0–200)
CHOLEST/HDLC SERPL: 2.5 {RATIO}
CO2 SERPL-SCNC: 27 MMOL/L (ref 21–32)
CREAT SERPL-MCNC: 1.24 MG/DL (ref 0.55–1.02)
EGFR (NO RACE VARIABLE) (RUSH/TITUS): 48 ML/MIN/1.73M2
EST. AVERAGE GLUCOSE BLD GHB EST-MCNC: 117 MG/DL
GLUCOSE SERPL-MCNC: 132 MG/DL (ref 74–106)
HBA1C MFR BLD HPLC: 5.7 % (ref 4.5–6.6)
HDLC SERPL-MCNC: 62 MG/DL (ref 40–60)
LDLC SERPL CALC-MCNC: 76 MG/DL
LDLC/HDLC SERPL: 1.2 {RATIO}
NONHDLC SERPL-MCNC: 94 MG/DL
POTASSIUM SERPL-SCNC: 4.5 MMOL/L (ref 3.5–5.1)
SODIUM SERPL-SCNC: 140 MMOL/L (ref 136–145)
TRIGL SERPL-MCNC: 88 MG/DL (ref 35–150)
VLDLC SERPL-MCNC: 18 MG/DL

## 2023-12-14 PROCEDURE — 3066F PR DOCUMENTATION OF TREATMENT FOR NEPHROPATHY: ICD-10-PCS | Mod: ,,, | Performed by: INTERNAL MEDICINE

## 2023-12-14 PROCEDURE — 3079F DIAST BP 80-89 MM HG: CPT | Mod: ,,, | Performed by: INTERNAL MEDICINE

## 2023-12-14 PROCEDURE — 3079F PR MOST RECENT DIASTOLIC BLOOD PRESSURE 80-89 MM HG: ICD-10-PCS | Mod: ,,, | Performed by: INTERNAL MEDICINE

## 2023-12-14 PROCEDURE — 3075F SYST BP GE 130 - 139MM HG: CPT | Mod: ,,, | Performed by: INTERNAL MEDICINE

## 2023-12-14 PROCEDURE — 1160F RVW MEDS BY RX/DR IN RCRD: CPT | Mod: ,,, | Performed by: INTERNAL MEDICINE

## 2023-12-14 PROCEDURE — 3075F PR MOST RECENT SYSTOLIC BLOOD PRESS GE 130-139MM HG: ICD-10-PCS | Mod: ,,, | Performed by: INTERNAL MEDICINE

## 2023-12-14 PROCEDURE — 3008F BODY MASS INDEX DOCD: CPT | Mod: ,,, | Performed by: INTERNAL MEDICINE

## 2023-12-14 PROCEDURE — 80061 LIPID PANEL: CPT | Mod: ,,, | Performed by: CLINICAL MEDICAL LABORATORY

## 2023-12-14 PROCEDURE — 3066F NEPHROPATHY DOC TX: CPT | Mod: ,,, | Performed by: INTERNAL MEDICINE

## 2023-12-14 PROCEDURE — 3061F NEG MICROALBUMINURIA REV: CPT | Mod: ,,, | Performed by: INTERNAL MEDICINE

## 2023-12-14 PROCEDURE — 1126F PR PAIN SEVERITY QUANTIFIED, NO PAIN PRESENT: ICD-10-PCS | Mod: ,,, | Performed by: INTERNAL MEDICINE

## 2023-12-14 PROCEDURE — 1159F PR MEDICATION LIST DOCUMENTED IN MEDICAL RECORD: ICD-10-PCS | Mod: ,,, | Performed by: INTERNAL MEDICINE

## 2023-12-14 PROCEDURE — 99214 PR OFFICE/OUTPT VISIT, EST, LEVL IV, 30-39 MIN: ICD-10-PCS | Mod: ,,, | Performed by: INTERNAL MEDICINE

## 2023-12-14 PROCEDURE — 1101F PT FALLS ASSESS-DOCD LE1/YR: CPT | Mod: ,,, | Performed by: INTERNAL MEDICINE

## 2023-12-14 PROCEDURE — 3008F PR BODY MASS INDEX (BMI) DOCUMENTED: ICD-10-PCS | Mod: ,,, | Performed by: INTERNAL MEDICINE

## 2023-12-14 PROCEDURE — 80061 LIPID PANEL: ICD-10-PCS | Mod: ,,, | Performed by: CLINICAL MEDICAL LABORATORY

## 2023-12-14 PROCEDURE — 3288F FALL RISK ASSESSMENT DOCD: CPT | Mod: ,,, | Performed by: INTERNAL MEDICINE

## 2023-12-14 PROCEDURE — 1159F MED LIST DOCD IN RCRD: CPT | Mod: ,,, | Performed by: INTERNAL MEDICINE

## 2023-12-14 PROCEDURE — 1101F PR PT FALLS ASSESS DOC 0-1 FALLS W/OUT INJ PAST YR: ICD-10-PCS | Mod: ,,, | Performed by: INTERNAL MEDICINE

## 2023-12-14 PROCEDURE — 3044F HG A1C LEVEL LT 7.0%: CPT | Mod: ,,, | Performed by: INTERNAL MEDICINE

## 2023-12-14 PROCEDURE — 80048 BASIC METABOLIC PNL TOTAL CA: CPT | Mod: ,,, | Performed by: CLINICAL MEDICAL LABORATORY

## 2023-12-14 PROCEDURE — 83036 HEMOGLOBIN A1C: ICD-10-PCS | Mod: ,,, | Performed by: CLINICAL MEDICAL LABORATORY

## 2023-12-14 PROCEDURE — 80048 BASIC METABOLIC PANEL: ICD-10-PCS | Mod: ,,, | Performed by: CLINICAL MEDICAL LABORATORY

## 2023-12-14 PROCEDURE — 1160F PR REVIEW ALL MEDS BY PRESCRIBER/CLIN PHARMACIST DOCUMENTED: ICD-10-PCS | Mod: ,,, | Performed by: INTERNAL MEDICINE

## 2023-12-14 PROCEDURE — 1126F AMNT PAIN NOTED NONE PRSNT: CPT | Mod: ,,, | Performed by: INTERNAL MEDICINE

## 2023-12-14 PROCEDURE — 3288F PR FALLS RISK ASSESSMENT DOCUMENTED: ICD-10-PCS | Mod: ,,, | Performed by: INTERNAL MEDICINE

## 2023-12-14 PROCEDURE — 3044F PR MOST RECENT HEMOGLOBIN A1C LEVEL <7.0%: ICD-10-PCS | Mod: ,,, | Performed by: INTERNAL MEDICINE

## 2023-12-14 PROCEDURE — 4010F PR ACE/ARB THEARPY RXD/TAKEN: ICD-10-PCS | Mod: ,,, | Performed by: INTERNAL MEDICINE

## 2023-12-14 PROCEDURE — 83036 HEMOGLOBIN GLYCOSYLATED A1C: CPT | Mod: ,,, | Performed by: CLINICAL MEDICAL LABORATORY

## 2023-12-14 PROCEDURE — 99214 OFFICE O/P EST MOD 30 MIN: CPT | Mod: ,,, | Performed by: INTERNAL MEDICINE

## 2023-12-14 PROCEDURE — 4010F ACE/ARB THERAPY RXD/TAKEN: CPT | Mod: ,,, | Performed by: INTERNAL MEDICINE

## 2023-12-14 PROCEDURE — 3061F PR NEG MICROALBUMINURIA RESULT DOCUMENTED/REVIEW: ICD-10-PCS | Mod: ,,, | Performed by: INTERNAL MEDICINE

## 2023-12-14 RX ORDER — METFORMIN HYDROCHLORIDE 850 MG/1
850 TABLET ORAL 3 TIMES DAILY
Qty: 180 TABLET | Refills: 3 | Status: SHIPPED | OUTPATIENT
Start: 2023-12-14

## 2023-12-14 RX ORDER — GLIPIZIDE 10 MG/1
10 TABLET ORAL 2 TIMES DAILY
Qty: 180 TABLET | Refills: 1 | Status: SHIPPED | OUTPATIENT
Start: 2023-12-14 | End: 2024-03-21 | Stop reason: SDUPTHER

## 2023-12-14 RX ORDER — MELOXICAM 15 MG/1
15 TABLET ORAL DAILY
Qty: 30 TABLET | Refills: 2 | Status: SHIPPED | OUTPATIENT
Start: 2023-12-14 | End: 2024-03-21 | Stop reason: SDUPTHER

## 2023-12-19 ENCOUNTER — TELEPHONE (OUTPATIENT)
Dept: FAMILY MEDICINE | Facility: CLINIC | Age: 68
End: 2023-12-19
Payer: MEDICARE

## 2023-12-19 NOTE — TELEPHONE ENCOUNTER
----- Message from Gabriel Mckenzie MD sent at 12/19/2023  9:57 AM CST -----  Need to see in  tyrone please  abnl results     1631 Pt is scheduled to come in on 01/24/24

## 2023-12-21 NOTE — PROGRESS NOTES
Subjective:       Patient ID: Karie Kaufman is a 68 y.o. female.    Chief Complaint: Diabetes and Medication Refill  Patient seen and evaluated patient has chronic hypertension chronic diabetes and chronic obesity regards to obesity recommend diet exercise and lifestyle modification  Health maintenance issues will check a BMP A1c a lipid panel today.  I am going to refill Glucotrol 10 mg 1 tablet by mouth b.i.d. patient's chronic osteoarthritis refill Mobic 15 mg 1 p.o. q.day. for the diabetes also refill metformin 500 mg 3 times a day.  Twelve point review of system has been done and it is unremarkable  Diabetes    Medication Refill      .    Current Medications:    Current Outpatient Medications:     acetaminophen (TYLENOL) 500 MG tablet, Take 500 mg by mouth every 6 (six) hours as needed for Pain., Disp: , Rfl:     aspirin (ECOTRIN) 81 MG EC tablet, Take 81 mg by mouth once daily., Disp: , Rfl:     atorvastatin (LIPITOR) 80 MG tablet, Take 1 tablet (80 mg total) by mouth once daily., Disp: 90 tablet, Rfl: 0    carvediloL (COREG) 6.25 MG tablet, Take 6.25 mg by mouth 2 (two) times daily., Disp: , Rfl:     clopidogreL (PLAVIX) 75 mg tablet, Take 75 mg by mouth once daily., Disp: , Rfl:     ezetimibe (ZETIA) 10 mg tablet, Take 10 mg by mouth once daily., Disp: , Rfl:     lisinopriL-hydrochlorothiazide (PRINZIDE,ZESTORETIC) 20-12.5 mg per tablet, Take 1 tablet by mouth 2 (two) times daily., Disp: 90 tablet, Rfl: 1    miSOPROStoL (CYTOTEC) 200 MCG Tab, INSERT ONE TABLET VAGINALLY AT BEDTIME WEDNESDAY NIGHT, Disp: , Rfl:     multivit-min/folic acid/biotin (HAIR,SKIN AND NAILS,FA-BIOTIN, ORAL), Take by mouth., Disp: , Rfl:     multivit-min/iron/FA/vit K/lut (CENTRUM SILVER WOMEN ORAL), Take by mouth once daily., Disp: , Rfl:     triamterene-hydrochlorothiazide 37.5-25 mg (MAXZIDE-25) 37.5-25 mg per tablet, Take 1 tablet by mouth once daily., Disp: 90 tablet, Rfl: 1    vitamin D (VITAMIN D3) 1000 units Tab, Take 1,000  "Units by mouth once daily., Disp: , Rfl:     azithromycin (Z-ALECIA) 250 MG tablet, Take 2 tablets by mouth on day 1; Take 1 tablet by mouth on days 2-5 (Patient not taking: Reported on 4/20/2023), Disp: 6 tablet, Rfl: 0    azithromycin (Z-ALECIA) 250 MG tablet, Take 2 tablets by mouth on day 1; Take 1 tablet by mouth on days 2-5 (Patient not taking: Reported on 9/6/2023), Disp: 6 tablet, Rfl: 1    glipiZIDE (GLUCOTROL) 10 MG tablet, Take 1 tablet (10 mg total) by mouth 2 (two) times a day., Disp: 180 tablet, Rfl: 1    guaiFENesin (MUCINEX) 600 mg 12 hr tablet, Take 1 tablet (600 mg total) by mouth 2 (two) times daily. (Patient not taking: Reported on 12/11/2023), Disp: 30 tablet, Rfl: 0    meloxicam (MOBIC) 15 MG tablet, Take 1 tablet (15 mg total) by mouth once daily., Disp: 30 tablet, Rfl: 2    metFORMIN (GLUCOPHAGE) 500 MG tablet, Take 1 tablet (500 mg total) by mouth 3 (three) times daily. (Patient not taking: Reported on 12/11/2023), Disp: 270 tablet, Rfl: 1    metFORMIN (GLUCOPHAGE) 850 MG tablet, Take 1 tablet (850 mg total) by mouth 3 (three) times daily., Disp: 180 tablet, Rfl: 3    promethazine (PHENERGAN) 6.25 mg/5 mL syrup, Take 20 mLs (25 mg total) by mouth every 6 (six) hours as needed for Nausea. (Patient not taking: Reported on 12/11/2023), Disp: 200 mL, Rfl: 0    scopolamine (TRANSDERM-SCOP) 1.3-1.5 mg (1 mg over 3 days), Place 1 patch onto the skin Every 3 (three) days. (Patient not taking: Reported on 12/11/2023), Disp: 5 patch, Rfl: 0           Review of Systems             Vitals:    12/14/23 1113   BP: 137/82   BP Location: Right arm   Patient Position: Sitting   BP Method: Large (Automatic)   Pulse: 62   Resp: 18   Temp: 97.6 °F (36.4 °C)   TempSrc: Temporal   SpO2: 96%   Weight: 106.2 kg (234 lb 1.6 oz)   Height: 5' 7" (1.702 m)        Physical Exam  Vitals and nursing note reviewed.   Constitutional:       Appearance: Normal appearance. She is obese.   HENT:      Head: Normocephalic and " atraumatic.      Right Ear: Tympanic membrane, ear canal and external ear normal.      Left Ear: Tympanic membrane, ear canal and external ear normal.      Nose: Nose normal.      Mouth/Throat:      Mouth: Mucous membranes are moist.      Pharynx: Oropharynx is clear.   Eyes:      Extraocular Movements: Extraocular movements intact.      Conjunctiva/sclera: Conjunctivae normal.      Pupils: Pupils are equal, round, and reactive to light.   Cardiovascular:      Rate and Rhythm: Normal rate and regular rhythm.      Pulses: Normal pulses.      Heart sounds: Normal heart sounds.   Pulmonary:      Effort: Pulmonary effort is normal.      Breath sounds: Normal breath sounds.   Abdominal:      General: Abdomen is flat. Bowel sounds are normal.      Palpations: Abdomen is soft.   Musculoskeletal:         General: Normal range of motion.      Cervical back: Normal range of motion and neck supple.   Skin:     General: Skin is warm and dry.   Neurological:      General: No focal deficit present.      Mental Status: She is alert. Mental status is at baseline. She is disoriented.   Psychiatric:         Mood and Affect: Mood normal.         Behavior: Behavior normal.         Thought Content: Thought content normal.         Judgment: Judgment normal.           Last Labs:     Office Visit on 12/14/2023   Component Date Value    Sodium 12/14/2023 140     Potassium 12/14/2023 4.5     Chloride 12/14/2023 109 (H)     CO2 12/14/2023 27     Anion Gap 12/14/2023 9     Glucose 12/14/2023 132 (H)     BUN 12/14/2023 22 (H)     Creatinine 12/14/2023 1.24 (H)     BUN/Creatinine Ratio 12/14/2023 18     Calcium 12/14/2023 10.1     eGFR 12/14/2023 48 (L)     Hemoglobin A1C 12/14/2023 5.7     Estimated Average Glucose 12/14/2023 117     Triglycerides 12/14/2023 88     Cholesterol 12/14/2023 156     HDL Cholesterol 12/14/2023 62 (H)     Cholesterol/HDL Ratio (R* 12/14/2023 2.5     Non-HDL 12/14/2023 94     LDL Calculated 12/14/2023 76     LDL/HDL  12/14/2023 1.2     VLDL 12/14/2023 18    Patient Outreach on 12/12/2023   Component Date Value    Left Eye DM Retinopathy 08/11/2023 Positive     Right Eye DM Retinopathy 08/11/2023 Positive        Last Imaging:  X-Ray Foot 2 View Right  Narrative: EXAMINATION:  XR ANKLE 2 VIEW RIGHT; XR FOOT 2 VIEW RIGHT    CLINICAL HISTORY:  Pain in right foot    COMPARISON:  None available    FINDINGS:  No evidence of fracture seen.  The alignment of the joints appears normal.  Mild ankle, midfoot and 1st metatarsophalangeal joint degenerative change is present.  No soft tissue abnormality is seen.  Impression: Mild osteoarthrosis.    Electronically signed by: Andrea Luna  Date:    01/10/2023  Time:    11:24  X-Ray Ankle 2 View Right  Narrative: EXAMINATION:  XR ANKLE 2 VIEW RIGHT; XR FOOT 2 VIEW RIGHT    CLINICAL HISTORY:  Pain in right foot    COMPARISON:  None available    FINDINGS:  No evidence of fracture seen.  The alignment of the joints appears normal.  Mild ankle, midfoot and 1st metatarsophalangeal joint degenerative change is present.  No soft tissue abnormality is seen.  Impression: Mild osteoarthrosis.    Electronically signed by: Andrea Luna  Date:    01/10/2023  Time:    11:24         **Labs and x-rays personally reviewed by me    ** reviewed      Objective:        Assessment:       1. Primary hypertension  Basic Metabolic Panel    Hemoglobin A1C    Lipid Panel    Basic Metabolic Panel    Hemoglobin A1C    Lipid Panel      2. DM (diabetes mellitus)  Hemoglobin A1C    Hemoglobin A1C      3. Obesity, unspecified classification, unspecified obesity type, unspecified whether serious comorbidity present             Plan:         1. Primary hypertension  -     Basic Metabolic Panel; Future; Expected date: 12/14/2023  -     Hemoglobin A1C; Future; Expected date: 12/14/2023  -     Lipid Panel; Future; Expected date: 12/14/2023    2. DM (diabetes mellitus)  -     Hemoglobin A1C; Future; Expected date:  12/14/2023    3. Obesity, unspecified classification, unspecified obesity type, unspecified whether serious comorbidity present    Other orders  -     glipiZIDE (GLUCOTROL) 10 MG tablet; Take 1 tablet (10 mg total) by mouth 2 (two) times a day.  Dispense: 180 tablet; Refill: 1  -     meloxicam (MOBIC) 15 MG tablet; Take 1 tablet (15 mg total) by mouth once daily.  Dispense: 30 tablet; Refill: 2  -     metFORMIN (GLUCOPHAGE) 850 MG tablet; Take 1 tablet (850 mg total) by mouth 3 (three) times daily.  Dispense: 180 tablet; Refill: 3

## 2023-12-31 ENCOUNTER — EXTERNAL CHRONIC CARE MANAGEMENT (OUTPATIENT)
Dept: FAMILY MEDICINE | Facility: CLINIC | Age: 68
End: 2023-12-31
Payer: MEDICARE

## 2023-12-31 PROCEDURE — G0511 CCM/BHI BY RHC/FQHC 20MIN MO: HCPCS | Mod: ,,, | Performed by: INTERNAL MEDICINE

## 2024-01-03 ENCOUNTER — HOSPITAL ENCOUNTER (OUTPATIENT)
Dept: RADIOLOGY | Facility: HOSPITAL | Age: 69
Discharge: HOME | End: 2024-01-03
Attending: INTERNAL MEDICINE
Payer: MEDICARE

## 2024-01-03 DIAGNOSIS — Z78.0 POSTMENOPAUSAL: ICD-10-CM

## 2024-01-03 PROCEDURE — 77080 DXA BONE DENSITY AXIAL: CPT | Mod: TC

## 2024-01-03 PROCEDURE — 77080 DXA BONE DENSITY AXIAL: CPT | Mod: 26,,, | Performed by: STUDENT IN AN ORGANIZED HEALTH CARE EDUCATION/TRAINING PROGRAM

## 2024-01-24 ENCOUNTER — OFFICE VISIT (OUTPATIENT)
Dept: FAMILY MEDICINE | Facility: CLINIC | Age: 69
End: 2024-01-24
Payer: MEDICARE

## 2024-01-24 VITALS
TEMPERATURE: 97 F | DIASTOLIC BLOOD PRESSURE: 60 MMHG | WEIGHT: 230 LBS | OXYGEN SATURATION: 96 % | BODY MASS INDEX: 36.1 KG/M2 | HEART RATE: 62 BPM | HEIGHT: 67 IN | RESPIRATION RATE: 18 BRPM | SYSTOLIC BLOOD PRESSURE: 128 MMHG

## 2024-01-24 DIAGNOSIS — Z12.31 ENCOUNTER FOR SCREENING MAMMOGRAM FOR MALIGNANT NEOPLASM OF BREAST: ICD-10-CM

## 2024-01-24 DIAGNOSIS — Z12.39 ENCOUNTER FOR SCREENING FOR MALIGNANT NEOPLASM OF BREAST, UNSPECIFIED SCREENING MODALITY: Primary | ICD-10-CM

## 2024-01-24 PROCEDURE — 3074F SYST BP LT 130 MM HG: CPT | Mod: ,,, | Performed by: INTERNAL MEDICINE

## 2024-01-24 PROCEDURE — 1126F AMNT PAIN NOTED NONE PRSNT: CPT | Mod: ,,, | Performed by: INTERNAL MEDICINE

## 2024-01-24 PROCEDURE — 3008F BODY MASS INDEX DOCD: CPT | Mod: ,,, | Performed by: INTERNAL MEDICINE

## 2024-01-24 PROCEDURE — 3078F DIAST BP <80 MM HG: CPT | Mod: ,,, | Performed by: INTERNAL MEDICINE

## 2024-01-24 PROCEDURE — 99214 OFFICE O/P EST MOD 30 MIN: CPT | Mod: ,,, | Performed by: INTERNAL MEDICINE

## 2024-01-24 PROCEDURE — 4010F ACE/ARB THERAPY RXD/TAKEN: CPT | Mod: ,,, | Performed by: INTERNAL MEDICINE

## 2024-01-24 PROCEDURE — 1101F PT FALLS ASSESS-DOCD LE1/YR: CPT | Mod: ,,, | Performed by: INTERNAL MEDICINE

## 2024-01-24 PROCEDURE — 1159F MED LIST DOCD IN RCRD: CPT | Mod: ,,, | Performed by: INTERNAL MEDICINE

## 2024-01-24 PROCEDURE — 3288F FALL RISK ASSESSMENT DOCD: CPT | Mod: ,,, | Performed by: INTERNAL MEDICINE

## 2024-01-26 PROBLEM — Z12.31 ENCOUNTER FOR SCREENING MAMMOGRAM FOR MALIGNANT NEOPLASM OF BREAST: Status: ACTIVE | Noted: 2023-06-05

## 2024-01-26 PROBLEM — Z12.39 ENCOUNTER FOR SCREENING FOR MALIGNANT NEOPLASM OF BREAST: Status: ACTIVE | Noted: 2023-06-05

## 2024-01-27 NOTE — PROGRESS NOTES
Subjective:       Patient ID: Karie Kaufman is a 68 y.o. female.    Chief Complaint: Results    HPI  .   Patient presents with chronic obesity chronic diabetes mellitus in here for lab results.  Patient informed me that she has recently had respiratory syncytial virus vaccine Pneumovax.  Other than that patient is doing relatively well she has in no distress labs and x-rays look good will continue current medical management  Current Medications:    Current Outpatient Medications:     acetaminophen (TYLENOL) 500 MG tablet, Take 500 mg by mouth every 6 (six) hours as needed for Pain., Disp: , Rfl:     aspirin (ECOTRIN) 81 MG EC tablet, Take 81 mg by mouth once daily., Disp: , Rfl:     atorvastatin (LIPITOR) 80 MG tablet, Take 1 tablet (80 mg total) by mouth once daily., Disp: 90 tablet, Rfl: 0    carvediloL (COREG) 6.25 MG tablet, Take 6.25 mg by mouth 2 (two) times daily., Disp: , Rfl:     clopidogreL (PLAVIX) 75 mg tablet, Take 75 mg by mouth once daily., Disp: , Rfl:     ezetimibe (ZETIA) 10 mg tablet, Take 10 mg by mouth once daily., Disp: , Rfl:     glipiZIDE (GLUCOTROL) 10 MG tablet, Take 1 tablet (10 mg total) by mouth 2 (two) times a day., Disp: 180 tablet, Rfl: 1    lisinopriL-hydrochlorothiazide (PRINZIDE,ZESTORETIC) 20-12.5 mg per tablet, Take 1 tablet by mouth 2 (two) times daily., Disp: 90 tablet, Rfl: 1    meloxicam (MOBIC) 15 MG tablet, Take 1 tablet (15 mg total) by mouth once daily., Disp: 30 tablet, Rfl: 2    metFORMIN (GLUCOPHAGE) 850 MG tablet, Take 1 tablet (850 mg total) by mouth 3 (three) times daily., Disp: 180 tablet, Rfl: 3    miSOPROStoL (CYTOTEC) 200 MCG Tab, INSERT ONE TABLET VAGINALLY AT BEDTIME WEDNESDAY NIGHT, Disp: , Rfl:     multivit-min/folic acid/biotin (HAIR,SKIN AND NAILS,FA-BIOTIN, ORAL), Take by mouth., Disp: , Rfl:     multivit-min/iron/FA/vit K/lut (CENTRUM SILVER WOMEN ORAL), Take by mouth once daily., Disp: , Rfl:     triamterene-hydrochlorothiazide 37.5-25 mg (MAXZIDE-25)  "37.5-25 mg per tablet, Take 1 tablet by mouth once daily., Disp: 90 tablet, Rfl: 1    vitamin D (VITAMIN D3) 1000 units Tab, Take 1,000 Units by mouth once daily., Disp: , Rfl:     azithromycin (Z-ALECIA) 250 MG tablet, Take 2 tablets by mouth on day 1; Take 1 tablet by mouth on days 2-5 (Patient not taking: Reported on 4/20/2023), Disp: 6 tablet, Rfl: 0    azithromycin (Z-ALECIA) 250 MG tablet, Take 2 tablets by mouth on day 1; Take 1 tablet by mouth on days 2-5 (Patient not taking: Reported on 9/6/2023), Disp: 6 tablet, Rfl: 1    guaiFENesin (MUCINEX) 600 mg 12 hr tablet, Take 1 tablet (600 mg total) by mouth 2 (two) times daily. (Patient not taking: Reported on 12/11/2023), Disp: 30 tablet, Rfl: 0    metFORMIN (GLUCOPHAGE) 500 MG tablet, Take 1 tablet (500 mg total) by mouth 3 (three) times daily. (Patient not taking: Reported on 12/11/2023), Disp: 270 tablet, Rfl: 1    promethazine (PHENERGAN) 6.25 mg/5 mL syrup, Take 20 mLs (25 mg total) by mouth every 6 (six) hours as needed for Nausea. (Patient not taking: Reported on 12/11/2023), Disp: 200 mL, Rfl: 0    scopolamine (TRANSDERM-SCOP) 1.3-1.5 mg (1 mg over 3 days), Place 1 patch onto the skin Every 3 (three) days. (Patient not taking: Reported on 12/11/2023), Disp: 5 patch, Rfl: 0           Review of Systems             Vitals:    01/24/24 1051 01/24/24 1130   BP: (!) 148/77 128/60   BP Location: Left arm Right arm   Patient Position: Sitting Sitting   BP Method: Large (Automatic)    Pulse: 62    Resp: 18    Temp: 97.3 °F (36.3 °C)    TempSrc: Temporal    SpO2: 96%    Weight: 104.3 kg (230 lb)    Height: 5' 7" (1.702 m)         Physical Exam  Vitals and nursing note reviewed.   Constitutional:       Appearance: Normal appearance. She is obese.   Cardiovascular:      Rate and Rhythm: Normal rate and regular rhythm.      Pulses: Normal pulses.      Heart sounds: Normal heart sounds.   Pulmonary:      Effort: Pulmonary effort is normal.      Breath sounds: Normal breath " sounds.   Abdominal:      General: Abdomen is flat. Bowel sounds are normal.      Palpations: Abdomen is soft.   Musculoskeletal:         General: Normal range of motion.   Skin:     General: Skin is warm and dry.   Neurological:      General: No focal deficit present.      Mental Status: She is alert and oriented to person, place, and time. Mental status is at baseline.           Last Labs:     No visits with results within 1 Month(s) from this visit.   Latest known visit with results is:   Office Visit on 12/14/2023   Component Date Value    Sodium 12/14/2023 140     Potassium 12/14/2023 4.5     Chloride 12/14/2023 109 (H)     CO2 12/14/2023 27     Anion Gap 12/14/2023 9     Glucose 12/14/2023 132 (H)     BUN 12/14/2023 22 (H)     Creatinine 12/14/2023 1.24 (H)     BUN/Creatinine Ratio 12/14/2023 18     Calcium 12/14/2023 10.1     eGFR 12/14/2023 48 (L)     Hemoglobin A1C 12/14/2023 5.7     Estimated Average Glucose 12/14/2023 117     Triglycerides 12/14/2023 88     Cholesterol 12/14/2023 156     HDL Cholesterol 12/14/2023 62 (H)     Cholesterol/HDL Ratio (R* 12/14/2023 2.5     Non-HDL 12/14/2023 94     LDL Calculated 12/14/2023 76     LDL/HDL 12/14/2023 1.2     VLDL 12/14/2023 18        Last Imaging:  DXA Bone Density Spine And Hip  Narrative: EXAMINATION:  DXA BONE DENSITY AXIAL SKELETON 1 OR MORE SITES    CLINICAL HISTORY:  Asymptomatic menopausal state    TECHNIQUE:  DXA scanning was performed over the left hip and lumbar spine.  Review of the images confirms satisfactory positioning and technique.    COMPARISON:  None    FINDINGS:  The L1 to L4 vertebral bone mineral density is equal to 1.3 g/cm squared with a T score of 2.2.    The left femoral neck bone mineral density is equal to 1.0 g/cm squared with a T score of 1.0.    The total hip bone mineral density is equal to 1.3 g/cm squared with a T score of 2.4.    There is a 2% risk of a major osteoporotic fracture and a 0.1% risk of hip fracture in the next  10 years (FRAX).  Impression: No evidence of significant bone density loss    Consider FDA approved medical therapies in postmenopausal women and men aged 50 years and older, based on the following:    *A hip or vertebral (clinical or morphometric) fracture  *T score less than or equal to -2.5 at the femoral neck or spine after appropriate evaluation to exclude secondary causes.  *Low bone mass -- also known as osteopenia (T score between -1.0 and -2.5 at the femoral neck or spine) and a 10 year probability of hip fracture greater than or equal to 3% or a 10 year probability of major osteoporosis-related fracture greater than or equal to 20% based on the [place]-adapted WHO algorithm.  *Clinicians judgment and/or patient preference may indicate treatment for people with 10 year fracture probabilities is above or below these levels.    Electronically signed by: Meet Shah  Date:    01/03/2024  Time:    09:38         **Labs and x-rays personally reviewed by me    ** reviewed      Objective:        Assessment:       1. Encounter for screening for malignant neoplasm of breast, unspecified screening modality  Mammo Digital Screening Bilat w/ [facility]      2. Encounter for screening mammogram for malignant neoplasm of breast  Mammo Digital Screening Bilat w/ [facility]od           Plan:         1. Encounter for screening for malignant neoplasm of breast, unspecified screening modality  -     Mammo Digital Screening Bilat w/ Vinod; Future; Expected date: 01/24/2024    2. Encounter for screening mammogram for malignant neoplasm of breast  -     Mammo Digital Screening Bilat w/ Vinod; Future; Expected date: 01/24/2024

## 2024-01-31 ENCOUNTER — EXTERNAL CHRONIC CARE MANAGEMENT (OUTPATIENT)
Dept: FAMILY MEDICINE | Facility: CLINIC | Age: 69
End: 2024-01-31
Payer: MEDICARE

## 2024-01-31 PROCEDURE — G0511 CCM/BHI BY RHC/FQHC 20MIN MO: HCPCS | Mod: ,,, | Performed by: INTERNAL MEDICINE

## 2024-02-29 ENCOUNTER — EXTERNAL CHRONIC CARE MANAGEMENT (OUTPATIENT)
Dept: FAMILY MEDICINE | Facility: CLINIC | Age: 69
End: 2024-02-29
Payer: MEDICARE

## 2024-02-29 PROCEDURE — G0511 CCM/BHI BY RHC/FQHC 20MIN MO: HCPCS | Mod: ,,, | Performed by: INTERNAL MEDICINE

## 2024-03-21 ENCOUNTER — OFFICE VISIT (OUTPATIENT)
Dept: FAMILY MEDICINE | Facility: CLINIC | Age: 69
End: 2024-03-21
Payer: MEDICARE

## 2024-03-21 VITALS
SYSTOLIC BLOOD PRESSURE: 122 MMHG | OXYGEN SATURATION: 96 % | HEART RATE: 60 BPM | TEMPERATURE: 98 F | BODY MASS INDEX: 35 KG/M2 | HEIGHT: 67 IN | RESPIRATION RATE: 18 BRPM | DIASTOLIC BLOOD PRESSURE: 77 MMHG | WEIGHT: 223 LBS

## 2024-03-21 DIAGNOSIS — M19.90 OSTEOARTHRITIS, UNSPECIFIED OSTEOARTHRITIS TYPE, UNSPECIFIED SITE: ICD-10-CM

## 2024-03-21 DIAGNOSIS — E11.9 TYPE 2 DIABETES MELLITUS WITHOUT COMPLICATION, WITHOUT LONG-TERM CURRENT USE OF INSULIN: Primary | ICD-10-CM

## 2024-03-21 PROCEDURE — 3288F FALL RISK ASSESSMENT DOCD: CPT | Mod: ,,, | Performed by: INTERNAL MEDICINE

## 2024-03-21 PROCEDURE — 3008F BODY MASS INDEX DOCD: CPT | Mod: ,,, | Performed by: INTERNAL MEDICINE

## 2024-03-21 PROCEDURE — 4010F ACE/ARB THERAPY RXD/TAKEN: CPT | Mod: ,,, | Performed by: INTERNAL MEDICINE

## 2024-03-21 PROCEDURE — 3078F DIAST BP <80 MM HG: CPT | Mod: ,,, | Performed by: INTERNAL MEDICINE

## 2024-03-21 PROCEDURE — 1159F MED LIST DOCD IN RCRD: CPT | Mod: ,,, | Performed by: INTERNAL MEDICINE

## 2024-03-21 PROCEDURE — 1101F PT FALLS ASSESS-DOCD LE1/YR: CPT | Mod: ,,, | Performed by: INTERNAL MEDICINE

## 2024-03-21 PROCEDURE — 3074F SYST BP LT 130 MM HG: CPT | Mod: ,,, | Performed by: INTERNAL MEDICINE

## 2024-03-21 PROCEDURE — 99214 OFFICE O/P EST MOD 30 MIN: CPT | Mod: ,,, | Performed by: INTERNAL MEDICINE

## 2024-03-21 PROCEDURE — 1126F AMNT PAIN NOTED NONE PRSNT: CPT | Mod: ,,, | Performed by: INTERNAL MEDICINE

## 2024-03-21 RX ORDER — GLIPIZIDE 10 MG/1
10 TABLET ORAL 2 TIMES DAILY
Qty: 180 TABLET | Refills: 1 | Status: SHIPPED | OUTPATIENT
Start: 2024-03-21 | End: 2024-09-17

## 2024-03-21 RX ORDER — MELOXICAM 15 MG/1
15 TABLET ORAL DAILY
Qty: 30 TABLET | Refills: 2 | Status: SHIPPED | OUTPATIENT
Start: 2024-03-21

## 2024-03-31 ENCOUNTER — EXTERNAL CHRONIC CARE MANAGEMENT (OUTPATIENT)
Dept: FAMILY MEDICINE | Facility: CLINIC | Age: 69
End: 2024-03-31
Payer: MEDICARE

## 2024-03-31 PROCEDURE — G0511 CCM/BHI BY RHC/FQHC 20MIN MO: HCPCS | Mod: ,,, | Performed by: INTERNAL MEDICINE

## 2024-04-03 NOTE — PROGRESS NOTES
Subjective:       Patient ID: Karie Kaufman is a 68 y.o. female.    Chief Complaint: Follow-up (3 month fu )    HPI  .  Patient presents chronic diabetes mellitus chronic osteoarthritis patient has intermittent pain patient stated the Mobic does control the pain    Current Medications:    Current Outpatient Medications:     acetaminophen (TYLENOL) 500 MG tablet, Take 500 mg by mouth every 6 (six) hours as needed for Pain., Disp: , Rfl:     aspirin (ECOTRIN) 81 MG EC tablet, Take 81 mg by mouth once daily., Disp: , Rfl:     atorvastatin (LIPITOR) 80 MG tablet, Take 1 tablet (80 mg total) by mouth once daily., Disp: 90 tablet, Rfl: 0    carvediloL (COREG) 6.25 MG tablet, Take 6.25 mg by mouth 2 (two) times daily., Disp: , Rfl:     clopidogreL (PLAVIX) 75 mg tablet, Take 75 mg by mouth once daily., Disp: , Rfl:     ezetimibe (ZETIA) 10 mg tablet, Take 10 mg by mouth once daily., Disp: , Rfl:     lisinopriL-hydrochlorothiazide (PRINZIDE,ZESTORETIC) 20-12.5 mg per tablet, Take 1 tablet by mouth 2 (two) times daily., Disp: 90 tablet, Rfl: 1    metFORMIN (GLUCOPHAGE) 850 MG tablet, Take 1 tablet (850 mg total) by mouth 3 (three) times daily., Disp: 180 tablet, Rfl: 3    miSOPROStoL (CYTOTEC) 200 MCG Tab, INSERT ONE TABLET VAGINALLY AT BEDTIME WEDNESDAY NIGHT, Disp: , Rfl:     multivit-min/folic acid/biotin (HAIR,SKIN AND NAILS,FA-BIOTIN, ORAL), Take by mouth., Disp: , Rfl:     multivit-min/iron/FA/vit K/lut (CENTRUM SILVER WOMEN ORAL), Take by mouth once daily., Disp: , Rfl:     triamterene-hydrochlorothiazide 37.5-25 mg (MAXZIDE-25) 37.5-25 mg per tablet, Take 1 tablet by mouth once daily., Disp: 90 tablet, Rfl: 1    vitamin D (VITAMIN D3) 1000 units Tab, Take 1,000 Units by mouth once daily., Disp: , Rfl:     azithromycin (Z-ALECIA) 250 MG tablet, Take 2 tablets by mouth on day 1; Take 1 tablet by mouth on days 2-5 (Patient not taking: Reported on 4/20/2023), Disp: 6 tablet, Rfl: 0    azithromycin (Z-ALECIA) 250 MG tablet,  "Take 2 tablets by mouth on day 1; Take 1 tablet by mouth on days 2-5 (Patient not taking: Reported on 9/6/2023), Disp: 6 tablet, Rfl: 1    glipiZIDE (GLUCOTROL) 10 MG tablet, Take 1 tablet (10 mg total) by mouth 2 (two) times a day., Disp: 180 tablet, Rfl: 1    guaiFENesin (MUCINEX) 600 mg 12 hr tablet, Take 1 tablet (600 mg total) by mouth 2 (two) times daily. (Patient not taking: Reported on 12/11/2023), Disp: 30 tablet, Rfl: 0    meloxicam (MOBIC) 15 MG tablet, Take 1 tablet (15 mg total) by mouth once daily., Disp: 30 tablet, Rfl: 2    metFORMIN (GLUCOPHAGE) 500 MG tablet, Take 1 tablet (500 mg total) by mouth 3 (three) times daily. (Patient not taking: Reported on 12/11/2023), Disp: 270 tablet, Rfl: 1    promethazine (PHENERGAN) 6.25 mg/5 mL syrup, Take 20 mLs (25 mg total) by mouth every 6 (six) hours as needed for Nausea. (Patient not taking: Reported on 12/11/2023), Disp: 200 mL, Rfl: 0    scopolamine (TRANSDERM-SCOP) 1.3-1.5 mg (1 mg over 3 days), Place 1 patch onto the skin Every 3 (three) days. (Patient not taking: Reported on 12/11/2023), Disp: 5 patch, Rfl: 0           ROS  Twelve point system reviewed, unremarkable except for stated above in HPI.        Objective:         Vitals:    03/21/24 0905   BP: 122/77   BP Location: Right arm   Patient Position: Sitting   BP Method: Large (Automatic)   Pulse: 60   Resp: 18   Temp: 97.8 °F (36.6 °C)   TempSrc: Temporal   SpO2: 96%   Weight: 101.2 kg (223 lb)   Height: 5' 7" (1.702 m)        Physical Exam     Patient is awake alert oriented person place and  Lungs are clear to auscultation bilaterally no crackles or wheezes   Cardiovascular S1-S2 regular rate and rhythm no murmurs rubs or gallops   Abdomen is soft positive bowel sounds nontender, extremities no clubbing cyanosis edema  Neuro no focal neurological deficits  Skin warm and dry.     Last Labs:     No visits with results within 1 Month(s) from this visit.   Latest known visit with results is:   Office " Visit on 12/14/2023   Component Date Value    Sodium 12/14/2023 140     Potassium 12/14/2023 4.5     Chloride 12/14/2023 109 (H)     CO2 12/14/2023 27     Anion Gap 12/14/2023 9     Glucose 12/14/2023 132 (H)     BUN 12/14/2023 22 (H)     Creatinine 12/14/2023 1.24 (H)     BUN/Creatinine Ratio 12/14/2023 18     Calcium 12/14/2023 10.1     eGFR 12/14/2023 48 (L)     Hemoglobin A1C 12/14/2023 5.7     Estimated Average Glucose 12/14/2023 117     Triglycerides 12/14/2023 88     Cholesterol 12/14/2023 156     HDL Cholesterol 12/14/2023 62 (H)     Cholesterol/HDL Ratio (R* 12/14/2023 2.5     Non-HDL 12/14/2023 94     LDL Calculated 12/14/2023 76     LDL/HDL 12/14/2023 1.2     VLDL 12/14/2023 18        Last Imaging:  DXA Bone Density Spine And Hip  Narrative: EXAMINATION:  DXA BONE DENSITY AXIAL SKELETON 1 OR MORE SITES    CLINICAL HISTORY:  Asymptomatic menopausal state    TECHNIQUE:  DXA scanning was performed over the left hip and lumbar spine.  Review of the images confirms satisfactory positioning and technique.    COMPARISON:  None    FINDINGS:  The L1 to L4 vertebral bone mineral density is equal to 1.3 g/cm squared with a T score of 2.2.    The left femoral neck bone mineral density is equal to 1.0 g/cm squared with a T score of 1.0.    The total hip bone mineral density is equal to 1.3 g/cm squared with a T score of 2.4.    There is a 2% risk of a major osteoporotic fracture and a 0.1% risk of hip fracture in the next 10 years (FRAX).  Impression: No evidence of significant bone density loss    Consider FDA approved medical therapies in postmenopausal women and men aged 50 years and older, based on the following:    *A hip or vertebral (clinical or morphometric) fracture  *T score less than or equal to -2.5 at the femoral neck or spine after appropriate evaluation to exclude secondary causes.  *Low bone mass -- also known as osteopenia (T score between -1.0 and -2.5 at the femoral neck or spine) and a 10 year  probability of hip fracture greater than or equal to 3% or a 10 year probability of major osteoporosis-related fracture greater than or equal to 20% based on the [place]-adapted WHO algorithm.  *Clinicians judgment and/or patient preference may indicate treatment for people with 10 year fracture probabilities is above or below these levels.    Electronically signed by: Meet Shah  Date:    01/03/2024  Time:    09:38         **Labs and x-rays personally reviewed by me    ** reviewed           Assessment & Plan:       1. Type 2 diabetes mellitus without complication, without long-term current use of insulin  -     Ambulatory referral/consult to Podiatry; Future; Expected date: 03/28/2024    2. Osteoarthritis, unspecified osteoarthritis type, unspecified site    Other orders  -     glipiZIDE (GLUCOTROL) 10 MG tablet; Take 1 tablet (10 mg total) by mouth 2 (two) times a day.  Dispense: 180 tablet; Refill: 1  -     meloxicam (MOBIC) 15 MG tablet; Take 1 tablet (15 mg total) by mouth once daily.  Dispense: 30 tablet; Refill: 2            Gabriel Mckenzie MD

## 2024-04-30 ENCOUNTER — EXTERNAL CHRONIC CARE MANAGEMENT (OUTPATIENT)
Dept: FAMILY MEDICINE | Facility: CLINIC | Age: 69
End: 2024-04-30
Payer: MEDICARE

## 2024-04-30 PROCEDURE — G0511 CCM/BHI BY RHC/FQHC 20MIN MO: HCPCS | Mod: ,,, | Performed by: INTERNAL MEDICINE

## 2024-05-31 ENCOUNTER — EXTERNAL CHRONIC CARE MANAGEMENT (OUTPATIENT)
Dept: FAMILY MEDICINE | Facility: CLINIC | Age: 69
End: 2024-05-31
Payer: MEDICARE

## 2024-05-31 PROCEDURE — G0511 CCM/BHI BY RHC/FQHC 20MIN MO: HCPCS | Mod: ,,, | Performed by: INTERNAL MEDICINE

## 2024-06-30 ENCOUNTER — EXTERNAL CHRONIC CARE MANAGEMENT (OUTPATIENT)
Dept: FAMILY MEDICINE | Facility: CLINIC | Age: 69
End: 2024-06-30
Payer: MEDICARE

## 2024-06-30 PROCEDURE — G0511 CCM/BHI BY RHC/FQHC 20MIN MO: HCPCS | Mod: ,,, | Performed by: INTERNAL MEDICINE

## 2024-07-22 ENCOUNTER — OFFICE VISIT (OUTPATIENT)
Dept: FAMILY MEDICINE | Facility: CLINIC | Age: 69
End: 2024-07-22
Payer: MEDICARE

## 2024-07-22 VITALS
DIASTOLIC BLOOD PRESSURE: 73 MMHG | HEART RATE: 55 BPM | TEMPERATURE: 98 F | SYSTOLIC BLOOD PRESSURE: 131 MMHG | HEIGHT: 67 IN | WEIGHT: 214 LBS | BODY MASS INDEX: 33.59 KG/M2 | RESPIRATION RATE: 16 BRPM | OXYGEN SATURATION: 100 %

## 2024-07-22 DIAGNOSIS — M19.90 OSTEOARTHRITIS, UNSPECIFIED OSTEOARTHRITIS TYPE, UNSPECIFIED SITE: ICD-10-CM

## 2024-07-22 DIAGNOSIS — E11.9 TYPE 2 DIABETES MELLITUS WITHOUT COMPLICATION, WITHOUT LONG-TERM CURRENT USE OF INSULIN: Primary | ICD-10-CM

## 2024-07-22 LAB
CHOLEST SERPL-MCNC: 145 MG/DL (ref 0–200)
CHOLEST/HDLC SERPL: 2.5 {RATIO}
CREAT UR-MCNC: 35 MG/DL (ref 28–219)
EST. AVERAGE GLUCOSE BLD GHB EST-MCNC: 105 MG/DL
HBA1C MFR BLD HPLC: 5.3 % (ref 4.5–6.6)
HDLC SERPL-MCNC: 59 MG/DL (ref 40–60)
LDLC SERPL CALC-MCNC: 70 MG/DL
LDLC/HDLC SERPL: 1.2 {RATIO}
MICROALBUMIN UR-MCNC: <0.5 MG/DL (ref 0–2.8)
MICROALBUMIN/CREAT RATIO PNL UR: <14.3 MG/G (ref 0–30)
NONHDLC SERPL-MCNC: 86 MG/DL
PROT UR-MCNC: 6.1 MG/DL (ref 0–11.9)
TRIGL SERPL-MCNC: 78 MG/DL (ref 35–150)
VLDLC SERPL-MCNC: 16 MG/DL

## 2024-07-22 PROCEDURE — 3008F BODY MASS INDEX DOCD: CPT | Mod: ,,, | Performed by: INTERNAL MEDICINE

## 2024-07-22 PROCEDURE — 4010F ACE/ARB THERAPY RXD/TAKEN: CPT | Mod: ,,, | Performed by: INTERNAL MEDICINE

## 2024-07-22 PROCEDURE — 3061F NEG MICROALBUMINURIA REV: CPT | Mod: ,,, | Performed by: INTERNAL MEDICINE

## 2024-07-22 PROCEDURE — 99214 OFFICE O/P EST MOD 30 MIN: CPT | Mod: ,,, | Performed by: INTERNAL MEDICINE

## 2024-07-22 PROCEDURE — 3066F NEPHROPATHY DOC TX: CPT | Mod: ,,, | Performed by: INTERNAL MEDICINE

## 2024-07-22 PROCEDURE — 1101F PT FALLS ASSESS-DOCD LE1/YR: CPT | Mod: ,,, | Performed by: INTERNAL MEDICINE

## 2024-07-22 PROCEDURE — 83036 HEMOGLOBIN GLYCOSYLATED A1C: CPT | Mod: ,,, | Performed by: CLINICAL MEDICAL LABORATORY

## 2024-07-22 PROCEDURE — 82570 ASSAY OF URINE CREATININE: CPT | Mod: ,,, | Performed by: CLINICAL MEDICAL LABORATORY

## 2024-07-22 PROCEDURE — 3288F FALL RISK ASSESSMENT DOCD: CPT | Mod: ,,, | Performed by: INTERNAL MEDICINE

## 2024-07-22 PROCEDURE — 84156 ASSAY OF PROTEIN URINE: CPT | Mod: ,,, | Performed by: CLINICAL MEDICAL LABORATORY

## 2024-07-22 PROCEDURE — 1159F MED LIST DOCD IN RCRD: CPT | Mod: ,,, | Performed by: INTERNAL MEDICINE

## 2024-07-22 PROCEDURE — 3078F DIAST BP <80 MM HG: CPT | Mod: ,,, | Performed by: INTERNAL MEDICINE

## 2024-07-22 PROCEDURE — 80061 LIPID PANEL: CPT | Mod: ,,, | Performed by: CLINICAL MEDICAL LABORATORY

## 2024-07-22 PROCEDURE — 1126F AMNT PAIN NOTED NONE PRSNT: CPT | Mod: ,,, | Performed by: INTERNAL MEDICINE

## 2024-07-22 PROCEDURE — 3044F HG A1C LEVEL LT 7.0%: CPT | Mod: ,,, | Performed by: INTERNAL MEDICINE

## 2024-07-22 PROCEDURE — 82043 UR ALBUMIN QUANTITATIVE: CPT | Mod: ,,, | Performed by: CLINICAL MEDICAL LABORATORY

## 2024-07-22 PROCEDURE — 3075F SYST BP GE 130 - 139MM HG: CPT | Mod: ,,, | Performed by: INTERNAL MEDICINE

## 2024-07-22 RX ORDER — GLIPIZIDE 10 MG/1
10 TABLET ORAL 2 TIMES DAILY
Qty: 180 TABLET | Refills: 1 | Status: SHIPPED | OUTPATIENT
Start: 2024-07-22 | End: 2025-01-18

## 2024-07-22 RX ORDER — METFORMIN HYDROCHLORIDE 850 MG/1
850 TABLET ORAL 3 TIMES DAILY
Qty: 270 TABLET | Refills: 1 | Status: SHIPPED | OUTPATIENT
Start: 2024-07-22

## 2024-07-22 RX ORDER — METFORMIN HYDROCHLORIDE 500 MG/1
500 TABLET ORAL 3 TIMES DAILY
Qty: 270 TABLET | Refills: 1 | Status: SHIPPED | OUTPATIENT
Start: 2024-07-22 | End: 2024-07-22

## 2024-07-22 RX ORDER — MELOXICAM 15 MG/1
15 TABLET ORAL DAILY
Qty: 30 TABLET | Refills: 2 | Status: SHIPPED | OUTPATIENT
Start: 2024-07-22

## 2024-07-24 NOTE — PROGRESS NOTES
Subjective:       Patient ID: Karie Kaufman is a 68 y.o. female.    Chief Complaint: Health Maintenance (Pt refuses vaccines, mammogram is UTD, Eye exam is UTD, and pt is currently Type 2 diabetic. A1C and lipids will be done today. //Pt sugar level today 108) and Diabetes    HPI  .  Patient complains of diffuse arthritis particularly in her knees this has chronic patient also has chronic diabetes mellitus.  Patient denies chest shortness a breath.    Current Medications:    Current Outpatient Medications:     acetaminophen (TYLENOL) 500 MG tablet, Take 500 mg by mouth every 6 (six) hours as needed for Pain., Disp: , Rfl:     aspirin (ECOTRIN) 81 MG EC tablet, Take 81 mg by mouth once daily., Disp: , Rfl:     atorvastatin (LIPITOR) 80 MG tablet, Take 1 tablet (80 mg total) by mouth once daily., Disp: 90 tablet, Rfl: 0    carvediloL (COREG) 6.25 MG tablet, Take 6.25 mg by mouth 2 (two) times daily., Disp: , Rfl:     clopidogreL (PLAVIX) 75 mg tablet, Take 75 mg by mouth once daily., Disp: , Rfl:     ezetimibe (ZETIA) 10 mg tablet, Take 10 mg by mouth once daily., Disp: , Rfl:     lisinopriL-hydrochlorothiazide (PRINZIDE,ZESTORETIC) 20-12.5 mg per tablet, Take 1 tablet by mouth 2 (two) times daily., Disp: 90 tablet, Rfl: 1    miSOPROStoL (CYTOTEC) 200 MCG Tab, INSERT ONE TABLET VAGINALLY AT BEDTIME WEDNESDAY NIGHT, Disp: , Rfl:     multivit-min/folic acid/biotin (HAIR,SKIN AND NAILS,FA-BIOTIN, ORAL), Take by mouth., Disp: , Rfl:     multivit-min/iron/FA/vit K/lut (CENTRUM SILVER WOMEN ORAL), Take by mouth once daily., Disp: , Rfl:     triamterene-hydrochlorothiazide 37.5-25 mg (MAXZIDE-25) 37.5-25 mg per tablet, Take 1 tablet by mouth once daily., Disp: 90 tablet, Rfl: 1    vitamin D (VITAMIN D3) 1000 units Tab, Take 1,000 Units by mouth once daily., Disp: , Rfl:     azithromycin (Z-ALECIA) 250 MG tablet, Take 2 tablets by mouth on day 1; Take 1 tablet by mouth on days 2-5 (Patient not taking: Reported on 4/20/2023),  "Disp: 6 tablet, Rfl: 0    azithromycin (Z-ALECIA) 250 MG tablet, Take 2 tablets by mouth on day 1; Take 1 tablet by mouth on days 2-5 (Patient not taking: Reported on 9/6/2023), Disp: 6 tablet, Rfl: 1    glipiZIDE (GLUCOTROL) 10 MG tablet, Take 1 tablet (10 mg total) by mouth 2 (two) times a day., Disp: 180 tablet, Rfl: 1    guaiFENesin (MUCINEX) 600 mg 12 hr tablet, Take 1 tablet (600 mg total) by mouth 2 (two) times daily. (Patient not taking: Reported on 12/11/2023), Disp: 30 tablet, Rfl: 0    meloxicam (MOBIC) 15 MG tablet, Take 1 tablet (15 mg total) by mouth once daily., Disp: 30 tablet, Rfl: 2    metFORMIN (GLUCOPHAGE) 500 MG tablet, Take 1 tablet (500 mg total) by mouth 3 (three) times daily. (Patient not taking: Reported on 12/11/2023), Disp: 270 tablet, Rfl: 1    metFORMIN (GLUCOPHAGE) 850 MG tablet, Take 1 tablet (850 mg total) by mouth 3 (three) times daily., Disp: 270 tablet, Rfl: 1    promethazine (PHENERGAN) 6.25 mg/5 mL syrup, Take 20 mLs (25 mg total) by mouth every 6 (six) hours as needed for Nausea. (Patient not taking: Reported on 12/11/2023), Disp: 200 mL, Rfl: 0    scopolamine (TRANSDERM-SCOP) 1.3-1.5 mg (1 mg over 3 days), Place 1 patch onto the skin Every 3 (three) days. (Patient not taking: Reported on 12/11/2023), Disp: 5 patch, Rfl: 0           ROS  Twelve point system reviewed, unremarkable except for stated above in HPI.        Objective:         Vitals:    07/22/24 0909   BP: 131/73   BP Location: Left forearm   Patient Position: Sitting   BP Method: Large (Automatic)   Pulse: (!) 55   Resp: 16   Temp: 97.8 °F (36.6 °C)   TempSrc: Temporal   SpO2: 100%   Weight: 97.1 kg (214 lb)   Height: 5' 7" (1.702 m)        Physical Exam     Patient is awake alert oriented person place and  Lungs are clear to auscultation bilaterally no crackles or wheezes   Cardiovascular S1-S2 regular rate and rhythm no murmurs rubs or gallops   Abdomen is soft positive bowel sounds nontender, extremities no clubbing " cyanosis edema  Neuro no focal neurological deficits  Skin warm and dry.     Last Labs:     Office Visit on 07/22/2024   Component Date Value    Hemoglobin A1C 07/22/2024 5.3     Estimated Average Glucose 07/22/2024 105     Triglycerides 07/22/2024 78     Cholesterol 07/22/2024 145     HDL Cholesterol 07/22/2024 59     Cholesterol/HDL Ratio (R* 07/22/2024 2.5     Non-HDL 07/22/2024 86     LDL Calculated 07/22/2024 70     LDL/HDL 07/22/2024 1.2     VLDL 07/22/2024 16     Protein, Urine 07/22/2024 6.1     Creatinine, Urine 07/22/2024 35     Microalbumin 07/22/2024 <0.5     Microalbumin/Creatinine * 07/22/2024 <14.3        Last Imaging:  DXA Bone Density Spine And Hip  Narrative: EXAMINATION:  DXA BONE DENSITY AXIAL SKELETON 1 OR MORE SITES    CLINICAL HISTORY:  Asymptomatic menopausal state    TECHNIQUE:  DXA scanning was performed over the left hip and lumbar spine.  Review of the images confirms satisfactory positioning and technique.    COMPARISON:  None    FINDINGS:  The L1 to L4 vertebral bone mineral density is equal to 1.3 g/cm squared with a T score of 2.2.    The left femoral neck bone mineral density is equal to 1.0 g/cm squared with a T score of 1.0.    The total hip bone mineral density is equal to 1.3 g/cm squared with a T score of 2.4.    There is a 2% risk of a major osteoporotic fracture and a 0.1% risk of hip fracture in the next 10 years (FRAX).  Impression: No evidence of significant bone density loss    Consider FDA approved medical therapies in postmenopausal women and men aged 50 years and older, based on the following:    *A hip or vertebral (clinical or morphometric) fracture  *T score less than or equal to -2.5 at the femoral neck or spine after appropriate evaluation to exclude secondary causes.  *Low bone mass -- also known as osteopenia (T score between -1.0 and -2.5 at the femoral neck or spine) and a 10 year probability of hip fracture greater than or equal to 3% or a 10 year probability  of major osteoporosis-related fracture greater than or equal to 20% based on the [place]-adapted WHO algorithm.  *Clinicians judgment and/or patient preference may indicate treatment for people with 10 year fracture probabilities is above or below these levels.    Electronically signed by: Meet Shah  Date:    01/03/2024  Time:    09:38         **Labs and x-rays personally reviewed by me    ** reviewed           Assessment & Plan:       1. Type 2 diabetes mellitus without complication, without long-term current use of insulin  -     Hemoglobin A1C; Future; Expected date: 07/22/2024  -     Lipid Panel; Future; Expected date: 07/22/2024  -     Protein, Random Urine; Future; Expected date: 07/22/2024  -     Microalbumin/Creatinine Ratio, Urine; Future; Expected date: 07/22/2024    2. Osteoarthritis, unspecified osteoarthritis type, unspecified site    Other orders  -     glipiZIDE (GLUCOTROL) 10 MG tablet; Take 1 tablet (10 mg total) by mouth 2 (two) times a day.  Dispense: 180 tablet; Refill: 1  -     meloxicam (MOBIC) 15 MG tablet; Take 1 tablet (15 mg total) by mouth once daily.  Dispense: 30 tablet; Refill: 2  -     Discontinue: metFORMIN (GLUCOPHAGE) 500 MG tablet; Take 1 tablet (500 mg total) by mouth 3 (three) times daily.  Dispense: 270 tablet; Refill: 1  -     metFORMIN (GLUCOPHAGE) 850 MG tablet; Take 1 tablet (850 mg total) by mouth 3 (three) times daily.  Dispense: 270 tablet; Refill: 1            Gabriel Mckenzie MD

## 2024-07-31 ENCOUNTER — EXTERNAL CHRONIC CARE MANAGEMENT (OUTPATIENT)
Dept: FAMILY MEDICINE | Facility: CLINIC | Age: 69
End: 2024-07-31
Payer: MEDICARE

## 2024-07-31 PROCEDURE — G0511 CCM/BHI BY RHC/FQHC 20MIN MO: HCPCS | Mod: ,,, | Performed by: INTERNAL MEDICINE

## 2024-08-12 LAB
LEFT EYE DM RETINOPATHY: POSITIVE
RIGHT EYE DM RETINOPATHY: POSITIVE

## 2024-08-31 ENCOUNTER — EXTERNAL CHRONIC CARE MANAGEMENT (OUTPATIENT)
Dept: FAMILY MEDICINE | Facility: CLINIC | Age: 69
End: 2024-08-31
Payer: MEDICARE

## 2024-08-31 PROCEDURE — G0511 CCM/BHI BY RHC/FQHC 20MIN MO: HCPCS | Mod: ,,, | Performed by: INTERNAL MEDICINE

## 2024-09-30 ENCOUNTER — EXTERNAL CHRONIC CARE MANAGEMENT (OUTPATIENT)
Dept: FAMILY MEDICINE | Facility: CLINIC | Age: 69
End: 2024-09-30
Payer: MEDICARE

## 2024-09-30 PROCEDURE — G0511 CCM/BHI BY RHC/FQHC 20MIN MO: HCPCS | Mod: ,,, | Performed by: INTERNAL MEDICINE

## 2024-10-22 ENCOUNTER — OFFICE VISIT (OUTPATIENT)
Dept: FAMILY MEDICINE | Facility: CLINIC | Age: 69
End: 2024-10-22
Payer: MEDICARE

## 2024-10-22 VITALS
DIASTOLIC BLOOD PRESSURE: 74 MMHG | TEMPERATURE: 98 F | RESPIRATION RATE: 18 BRPM | HEART RATE: 60 BPM | HEIGHT: 67 IN | WEIGHT: 218 LBS | SYSTOLIC BLOOD PRESSURE: 136 MMHG | OXYGEN SATURATION: 98 % | BODY MASS INDEX: 34.21 KG/M2

## 2024-10-22 DIAGNOSIS — K57.92 DIVERTICULITIS: ICD-10-CM

## 2024-10-22 DIAGNOSIS — M17.11 PRIMARY OSTEOARTHRITIS OF RIGHT KNEE: Primary | Chronic | ICD-10-CM

## 2024-10-22 DIAGNOSIS — E11.9 TYPE 2 DIABETES MELLITUS WITHOUT COMPLICATION, WITHOUT LONG-TERM CURRENT USE OF INSULIN: ICD-10-CM

## 2024-10-22 PROCEDURE — 1159F MED LIST DOCD IN RCRD: CPT | Mod: ,,, | Performed by: INTERNAL MEDICINE

## 2024-10-22 PROCEDURE — 4010F ACE/ARB THERAPY RXD/TAKEN: CPT | Mod: ,,, | Performed by: INTERNAL MEDICINE

## 2024-10-22 PROCEDURE — 3078F DIAST BP <80 MM HG: CPT | Mod: ,,, | Performed by: INTERNAL MEDICINE

## 2024-10-22 PROCEDURE — 3075F SYST BP GE 130 - 139MM HG: CPT | Mod: ,,, | Performed by: INTERNAL MEDICINE

## 2024-10-22 PROCEDURE — 3288F FALL RISK ASSESSMENT DOCD: CPT | Mod: ,,, | Performed by: INTERNAL MEDICINE

## 2024-10-22 PROCEDURE — 3008F BODY MASS INDEX DOCD: CPT | Mod: ,,, | Performed by: INTERNAL MEDICINE

## 2024-10-22 PROCEDURE — 3044F HG A1C LEVEL LT 7.0%: CPT | Mod: ,,, | Performed by: INTERNAL MEDICINE

## 2024-10-22 PROCEDURE — 3061F NEG MICROALBUMINURIA REV: CPT | Mod: ,,, | Performed by: INTERNAL MEDICINE

## 2024-10-22 PROCEDURE — 99213 OFFICE O/P EST LOW 20 MIN: CPT | Mod: ,,, | Performed by: INTERNAL MEDICINE

## 2024-10-22 PROCEDURE — 1101F PT FALLS ASSESS-DOCD LE1/YR: CPT | Mod: ,,, | Performed by: INTERNAL MEDICINE

## 2024-10-22 PROCEDURE — 1126F AMNT PAIN NOTED NONE PRSNT: CPT | Mod: ,,, | Performed by: INTERNAL MEDICINE

## 2024-10-22 PROCEDURE — 3066F NEPHROPATHY DOC TX: CPT | Mod: ,,, | Performed by: INTERNAL MEDICINE

## 2024-10-22 RX ORDER — MELOXICAM 15 MG/1
15 TABLET ORAL DAILY
Qty: 30 TABLET | Refills: 2 | Status: SHIPPED | OUTPATIENT
Start: 2024-10-22

## 2024-10-23 NOTE — PROGRESS NOTES
Subjective:       Patient ID: Karie Kaufman is a 69 y.o. female.    Chief Complaint: Diabetes (3 month fu ) and Health Maintenance (Pt is scheduled for mammogram; pt refuses other care gaps )    HPI  .  Patient complains of moderate pain to the right.  Pain is intermittent in she sometimes rated a 6 to 7/10.  Right knee is slightly swollen with evidence of crepitus    Current Medications:    Current Outpatient Medications:     acetaminophen (TYLENOL) 500 MG tablet, Take 500 mg by mouth every 6 (six) hours as needed for Pain., Disp: , Rfl:     aspirin (ECOTRIN) 81 MG EC tablet, Take 81 mg by mouth once daily., Disp: , Rfl:     atorvastatin (LIPITOR) 80 MG tablet, Take 1 tablet (80 mg total) by mouth once daily., Disp: 90 tablet, Rfl: 0    carvediloL (COREG) 6.25 MG tablet, Take 6.25 mg by mouth 2 (two) times daily., Disp: , Rfl:     clopidogreL (PLAVIX) 75 mg tablet, Take 75 mg by mouth once daily., Disp: , Rfl:     ezetimibe (ZETIA) 10 mg tablet, Take 10 mg by mouth once daily., Disp: , Rfl:     glipiZIDE (GLUCOTROL) 10 MG tablet, Take 1 tablet (10 mg total) by mouth 2 (two) times a day., Disp: 180 tablet, Rfl: 1    lisinopriL-hydrochlorothiazide (PRINZIDE,ZESTORETIC) 20-12.5 mg per tablet, Take 1 tablet by mouth 2 (two) times daily., Disp: 90 tablet, Rfl: 1    metFORMIN (GLUCOPHAGE) 850 MG tablet, Take 1 tablet (850 mg total) by mouth 3 (three) times daily., Disp: 270 tablet, Rfl: 1    miSOPROStoL (CYTOTEC) 200 MCG Tab, INSERT ONE TABLET VAGINALLY AT BEDTIME WEDNESDAY NIGHT, Disp: , Rfl:     multivit-min/folic acid/biotin (HAIR,SKIN AND NAILS,FA-BIOTIN, ORAL), Take by mouth., Disp: , Rfl:     multivit-min/iron/FA/vit K/lut (CENTRUM SILVER WOMEN ORAL), Take by mouth once daily., Disp: , Rfl:     triamterene-hydrochlorothiazide 37.5-25 mg (MAXZIDE-25) 37.5-25 mg per tablet, Take 1 tablet by mouth once daily., Disp: 90 tablet, Rfl: 1    vitamin D (VITAMIN D3) 1000 units Tab, Take 1,000 Units by mouth once daily.,  "Disp: , Rfl:     azithromycin (Z-ALECIA) 250 MG tablet, Take 2 tablets by mouth on day 1; Take 1 tablet by mouth on days 2-5 (Patient not taking: Reported on 10/22/2024), Disp: 6 tablet, Rfl: 0    azithromycin (Z-ALECIA) 250 MG tablet, Take 2 tablets by mouth on day 1; Take 1 tablet by mouth on days 2-5 (Patient not taking: Reported on 10/22/2024), Disp: 6 tablet, Rfl: 1    guaiFENesin (MUCINEX) 600 mg 12 hr tablet, Take 1 tablet (600 mg total) by mouth 2 (two) times daily. (Patient not taking: Reported on 10/22/2024), Disp: 30 tablet, Rfl: 0    meloxicam (MOBIC) 15 MG tablet, Take 1 tablet (15 mg total) by mouth once daily., Disp: 30 tablet, Rfl: 2    metFORMIN (GLUCOPHAGE) 500 MG tablet, Take 1 tablet (500 mg total) by mouth 3 (three) times daily. (Patient not taking: Reported on 10/22/2024), Disp: 270 tablet, Rfl: 1    promethazine (PHENERGAN) 6.25 mg/5 mL syrup, Take 20 mLs (25 mg total) by mouth every 6 (six) hours as needed for Nausea. (Patient not taking: Reported on 10/22/2024), Disp: 200 mL, Rfl: 0    scopolamine (TRANSDERM-SCOP) 1.3-1.5 mg (1 mg over 3 days), Place 1 patch onto the skin Every 3 (three) days. (Patient not taking: Reported on 10/22/2024), Disp: 5 patch, Rfl: 0           ROS  Twelve point system reviewed, unremarkable except for stated above in HPI.        Objective:         Vitals:    10/22/24 0936   BP: 136/74   BP Location: Right arm   Patient Position: Sitting   Pulse: 60   Resp: 18   Temp: 97.7 °F (36.5 °C)   TempSrc: Tympanic   SpO2: 98%   Weight: 98.9 kg (218 lb)   Height: 5' 7" (1.702 m)        Physical Exam     Patient is awake alert oriented person place and  Lungs are clear to auscultation bilaterally no crackles or wheezes   Cardiovascular S1-S2 regular rate and rhythm no murmurs rubs or gallops   Abdomen is soft positive bowel sounds nontender, extremities no clubbing cyanosis edema  Neuro no focal neurological deficits  Skin warm and dry.     Last Labs:     No visits with results " within 1 Month(s) from this visit.   Latest known visit with results is:   Office Visit on 07/22/2024   Component Date Value    Hemoglobin A1C 07/22/2024 5.3     Estimated Average Glucose 07/22/2024 105     Triglycerides 07/22/2024 78     Cholesterol 07/22/2024 145     HDL Cholesterol 07/22/2024 59     Cholesterol/HDL Ratio (R* 07/22/2024 2.5     Non-HDL 07/22/2024 86     LDL Calculated 07/22/2024 70     LDL/HDL 07/22/2024 1.2     VLDL 07/22/2024 16     Protein, Urine 07/22/2024 6.1     Creatinine, Urine 07/22/2024 35     Microalbumin 07/22/2024 <0.5     Microalbumin/Creatinine * 07/22/2024 <14.3        Last Imaging:  DXA Bone Density Spine And Hip  Narrative: EXAMINATION:  DXA BONE DENSITY AXIAL SKELETON 1 OR MORE SITES    CLINICAL HISTORY:  Asymptomatic menopausal state    TECHNIQUE:  DXA scanning was performed over the left hip and lumbar spine.  Review of the images confirms satisfactory positioning and technique.    COMPARISON:  None    FINDINGS:  The L1 to L4 vertebral bone mineral density is equal to 1.3 g/cm squared with a T score of 2.2.    The left femoral neck bone mineral density is equal to 1.0 g/cm squared with a T score of 1.0.    The total hip bone mineral density is equal to 1.3 g/cm squared with a T score of 2.4.    There is a 2% risk of a major osteoporotic fracture and a 0.1% risk of hip fracture in the next 10 years (FRAX).  Impression: No evidence of significant bone density loss    Consider FDA approved medical therapies in postmenopausal women and men aged 50 years and older, based on the following:    *A hip or vertebral (clinical or morphometric) fracture  *T score less than or equal to -2.5 at the femoral neck or spine after appropriate evaluation to exclude secondary causes.  *Low bone mass -- also known as osteopenia (T score between -1.0 and -2.5 at the femoral neck or spine) and a 10 year probability of hip fracture greater than or equal to 3% or a 10 year probability of major  osteoporosis-related fracture greater than or equal to 20% based on the [place]-adapted WHO algorithm.  *Clinicians judgment and/or patient preference may indicate treatment for people with 10 year fracture probabilities is above or below these levels.    Electronically signed by: Meet Shah  Date:    01/03/2024  Time:    09:38         **Labs and x-rays personally reviewed by me    ** reviewed           Assessment & Plan:       1. Primary osteoarthritis of right knee  -     meloxicam (MOBIC) 15 MG tablet; Take 1 tablet (15 mg total) by mouth once daily.  Dispense: 30 tablet; Refill: 2      Gabriel Mckenzie MD

## 2024-10-31 ENCOUNTER — EXTERNAL CHRONIC CARE MANAGEMENT (OUTPATIENT)
Dept: FAMILY MEDICINE | Facility: CLINIC | Age: 69
End: 2024-10-31
Payer: MEDICARE

## 2024-10-31 PROCEDURE — G0511 CCM/BHI BY RHC/FQHC 20MIN MO: HCPCS | Mod: ,,, | Performed by: INTERNAL MEDICINE

## 2024-11-30 ENCOUNTER — EXTERNAL CHRONIC CARE MANAGEMENT (OUTPATIENT)
Dept: FAMILY MEDICINE | Facility: CLINIC | Age: 69
End: 2024-11-30
Payer: MEDICARE

## 2024-11-30 PROCEDURE — G0511 CCM/BHI BY RHC/FQHC 20MIN MO: HCPCS | Mod: ,,, | Performed by: INTERNAL MEDICINE

## 2024-12-13 ENCOUNTER — OFFICE VISIT (OUTPATIENT)
Dept: FAMILY MEDICINE | Facility: CLINIC | Age: 69
End: 2024-12-13
Payer: MEDICARE

## 2024-12-13 VITALS
SYSTOLIC BLOOD PRESSURE: 116 MMHG | OXYGEN SATURATION: 98 % | HEART RATE: 68 BPM | TEMPERATURE: 98 F | WEIGHT: 221.63 LBS | DIASTOLIC BLOOD PRESSURE: 70 MMHG | RESPIRATION RATE: 14 BRPM | HEIGHT: 67 IN | BODY MASS INDEX: 34.79 KG/M2

## 2024-12-13 DIAGNOSIS — E11.9 TYPE 2 DIABETES MELLITUS WITHOUT COMPLICATION, WITHOUT LONG-TERM CURRENT USE OF INSULIN: ICD-10-CM

## 2024-12-13 DIAGNOSIS — E66.811 CLASS 1 OBESITY WITH BODY MASS INDEX (BMI) OF 34.0 TO 34.9 IN ADULT, UNSPECIFIED OBESITY TYPE, UNSPECIFIED WHETHER SERIOUS COMORBIDITY PRESENT: ICD-10-CM

## 2024-12-13 DIAGNOSIS — M19.90 OSTEOARTHRITIS, UNSPECIFIED OSTEOARTHRITIS TYPE, UNSPECIFIED SITE: ICD-10-CM

## 2024-12-13 DIAGNOSIS — E78.5 DYSLIPIDEMIA: ICD-10-CM

## 2024-12-13 DIAGNOSIS — Z00.00 ENCOUNTER FOR SUBSEQUENT ANNUAL WELLNESS VISIT (AWV) IN MEDICARE PATIENT: ICD-10-CM

## 2024-12-13 DIAGNOSIS — I25.118 CORONARY ARTERY DISEASE OF NATIVE ARTERY OF NATIVE HEART WITH STABLE ANGINA PECTORIS: ICD-10-CM

## 2024-12-13 DIAGNOSIS — I10 PRIMARY HYPERTENSION: ICD-10-CM

## 2024-12-13 DIAGNOSIS — Z00.00 ENCOUNTER FOR PREVENTIVE HEALTH EXAMINATION: Primary | ICD-10-CM

## 2024-12-13 RX ORDER — MELOXICAM 15 MG/1
15 TABLET ORAL DAILY
Qty: 30 TABLET | Refills: 2 | Status: SHIPPED | OUTPATIENT
Start: 2024-12-13

## 2024-12-13 RX ORDER — SULFAMETHOXAZOLE AND TRIMETHOPRIM 800; 160 MG/1; MG/1
1 TABLET ORAL 2 TIMES DAILY
Qty: 20 TABLET | Refills: 0 | Status: SHIPPED | OUTPATIENT
Start: 2024-12-13

## 2024-12-13 RX ORDER — MUPIROCIN 20 MG/G
OINTMENT TOPICAL 3 TIMES DAILY
Qty: 30 G | Refills: 0 | Status: SHIPPED | OUTPATIENT
Start: 2024-12-13

## 2024-12-13 NOTE — PROGRESS NOTES
"  Karie Kaufman presented for a  Medicare AWV and comprehensive Health Risk Assessment today. The following components were reviewed and updated:    Medical history  Family History  Social history  Allergies and Current Medications  Health Risk Assessment  Health Maintenance  Care Team         ** See Completed Assessments for Annual Wellness Visit within the encounter summary.**         The following assessments were completed:  Living Situation  CAGE  Depression Screening  Timed Get Up and Go  Whisper Test  Cognitive Function Screening  Nutrition Screening  ADL Screening  PAQ Screening      Opioid documentation:      Patient does not have a current opioid prescription.        Vitals:    12/13/24 1007   BP: 116/70   BP Location: Left arm   Patient Position: Sitting   Pulse: 68   Resp: 14   Temp: 97.6 °F (36.4 °C)   TempSrc: Oral   SpO2: 98%   Weight: 100.5 kg (221 lb 9.6 oz)   Height: 5' 7" (1.702 m)     Body mass index is 34.71 kg/m².  Physical Exam  Vitals and nursing note reviewed.   Constitutional:       Appearance: Normal appearance. She is obese.   Cardiovascular:      Rate and Rhythm: Normal rate and regular rhythm.      Pulses: Normal pulses.      Heart sounds: Normal heart sounds.   Pulmonary:      Effort: Pulmonary effort is normal.      Breath sounds: Normal breath sounds.   Abdominal:      General: Abdomen is flat. Bowel sounds are normal.      Palpations: Abdomen is soft.   Musculoskeletal:         General: Normal range of motion.   Skin:     General: Skin is warm and dry.      Capillary Refill: Capillary refill takes less than 2 seconds.   Neurological:      General: No focal deficit present.      Mental Status: She is alert and oriented to person, place, and time.   Psychiatric:         Mood and Affect: Mood normal.         Behavior: Behavior normal.         Thought Content: Thought content normal.         Judgment: Judgment normal.               Diagnoses and health risks identified today and " associated recommendations/orders:    1. Encounter for preventive health examination      2. Encounter for subsequent annual wellness visit (AWV) in Medicare patient      3. Type 2 diabetes mellitus without complication, without long-term current use of insulin      4. Osteoarthritis, unspecified osteoarthritis type, unspecified site      5. Primary hypertension      6. Dyslipidemia      7. Coronary artery disease of native artery of native heart with stable angina pectoris      8. BMI 34.0-34.9,adult        Provided Karie with a 5-10 year written screening schedule and personal prevention plan. Recommendations were developed using the USPSTF age appropriate recommendations. Education, counseling, and referrals were provided as needed. After Visit Summary printed and given to patient which includes a list of additional screenings\tests needed.    [facility]     Follow up in about 53 weeks (around 12/19/2025) for in 1 year for annual wellness visit at 9:00 a.m..    ARIEL MEJIA, JAKE-C    I offered to discuss advanced care planning, including how to pick a person who would make decisions for you if you were unable to make them for yourself, called a health care power of , and what kind of decisions you might make such as use of life sustaining treatments such as ventilators and tube feeding when faced with a life limiting illness recorded on a living will that they will need to know. (How you want to be cared for as you near the end of your natural life)     X Patient is interested in learning more about how to make advanced directives.  I provided them paperwork and offered to discuss this with them.

## 2024-12-13 NOTE — LETTER
AUTHORIZATION FOR RELEASE OF   CONFIDENTIAL INFORMATION    [facility],    We are seeing Karie Kaufman, date of birth 1955, in the clinic at [facility]. Gabriel Mckenzie MD is the patient's PCP. Karie Kaufman has an outstanding lab/procedure at the time we reviewed her chart. In order to help keep her health information updated, she has authorized us to request the following medical record(s):        (  )  MAMMOGRAM                                      (  )  COLONOSCOPY      (  )  PAP SMEAR                                          (  )  OUTSIDE LAB RESULTS     (  )  DEXA SCAN                                          ( x )  EYE EXAM            (  )  FOOT EXAM                                          (  )  ENTIRE RECORD     (  )  OUTSIDE IMMUNIZATIONS                 (  )  _______________         Please fax records to [facility], [place], John Anthony, MD, [phone]     If you have any questions, please contact Arabella Patrick rn awv nurse at   [phone].           Patient Name: Karie Kaufman  : 1955  Patient Phone #: [phone]

## 2024-12-13 NOTE — PATIENT INSTRUCTIONS
Counseling and Referral of Other Preventative  (Italic type indicates deductible and co-insurance are waived)    Patient Name: Karie Kaufman  Today's Date: 12/13/2024    Health Maintenance       Date Due Completion Date    Foot Exam Never done ---    Mammogram 10/31/2023 10/31/2022    Eye Exam 08/11/2024 8/11/2023    Hemoglobin A1c 01/22/2025 7/22/2024    Diabetes Urine Screening 07/22/2025 7/22/2024    Lipid Panel 07/22/2025 7/22/2024    Colorectal Cancer Screening 09/30/2025 9/30/2020    High Dose Statin 10/22/2025 10/22/2024    Aspirin/Antiplatelet Therapy 10/22/2025 10/22/2024    DEXA Scan 01/03/2027 1/3/2024    TETANUS VACCINE 08/18/2032 8/18/2022        No orders of the defined types were placed in this encounter.    The following information is provided to all patients.  This information is to help you find resources for any of the problems found today that may be affecting your health:                  Living healthy guide: ms.gov    Understanding Diabetes: www.diabetes.org      Eating healthy: www.cdc.gov/healthyweight      CDC home safety checklist: www.cdc.gov/steadi/patient.html      Agency on Aging: ms.gov    Alcoholics anonymous (AA): www.aa.org      Physical Activity: www.ebenezer.nih.gov/zr3cmrk      Tobacco use: ms.gov

## 2024-12-26 ENCOUNTER — PATIENT OUTREACH (OUTPATIENT)
Facility: HOSPITAL | Age: 69
End: 2024-12-26
Payer: MEDICARE

## 2024-12-31 ENCOUNTER — EXTERNAL CHRONIC CARE MANAGEMENT (OUTPATIENT)
Dept: FAMILY MEDICINE | Facility: CLINIC | Age: 69
End: 2024-12-31
Payer: MEDICARE

## 2024-12-31 PROCEDURE — G0511 CCM/BHI BY RHC/FQHC 20MIN MO: HCPCS | Mod: ,,, | Performed by: INTERNAL MEDICINE

## 2025-01-31 ENCOUNTER — EXTERNAL CHRONIC CARE MANAGEMENT (OUTPATIENT)
Dept: FAMILY MEDICINE | Facility: CLINIC | Age: 70
End: 2025-01-31
Payer: MEDICARE

## 2025-01-31 PROCEDURE — G0511 CCM/BHI BY RHC/FQHC 20MIN MO: HCPCS | Mod: ,,, | Performed by: INTERNAL MEDICINE

## 2025-02-24 ENCOUNTER — OFFICE VISIT (OUTPATIENT)
Dept: FAMILY MEDICINE | Facility: CLINIC | Age: 70
End: 2025-02-24
Payer: MEDICARE

## 2025-02-24 VITALS
WEIGHT: 222 LBS | OXYGEN SATURATION: 95 % | DIASTOLIC BLOOD PRESSURE: 79 MMHG | HEIGHT: 67 IN | TEMPERATURE: 98 F | SYSTOLIC BLOOD PRESSURE: 130 MMHG | BODY MASS INDEX: 34.84 KG/M2 | HEART RATE: 92 BPM | RESPIRATION RATE: 18 BRPM

## 2025-02-24 DIAGNOSIS — E11.9 TYPE 2 DIABETES MELLITUS WITHOUT COMPLICATION, WITHOUT LONG-TERM CURRENT USE OF INSULIN: Primary | ICD-10-CM

## 2025-02-24 DIAGNOSIS — I10 PRIMARY HYPERTENSION: ICD-10-CM

## 2025-02-24 DIAGNOSIS — M25.561 RIGHT KNEE PAIN, UNSPECIFIED CHRONICITY: ICD-10-CM

## 2025-02-24 LAB
ALBUMIN SERPL BCP-MCNC: 3.8 G/DL (ref 3.4–4.8)
ALBUMIN/GLOB SERPL: 1.1 {RATIO}
ALP SERPL-CCNC: 58 U/L (ref 40–150)
ALT SERPL W P-5'-P-CCNC: 14 U/L
ANION GAP SERPL CALCULATED.3IONS-SCNC: 12 MMOL/L (ref 7–16)
AST SERPL W P-5'-P-CCNC: 22 U/L (ref 5–34)
BASOPHILS # BLD AUTO: 0.03 K/UL (ref 0–0.2)
BASOPHILS NFR BLD AUTO: 0.8 % (ref 0–1)
BILIRUB SERPL-MCNC: 0.7 MG/DL
BUN SERPL-MCNC: 30 MG/DL (ref 10–20)
BUN/CREAT SERPL: 23 (ref 6–20)
CALCIUM SERPL-MCNC: 9.8 MG/DL (ref 8.4–10.2)
CHLORIDE SERPL-SCNC: 110 MMOL/L (ref 98–107)
CO2 SERPL-SCNC: 25 MMOL/L (ref 23–31)
CREAT SERPL-MCNC: 1.33 MG/DL (ref 0.55–1.02)
DIFFERENTIAL METHOD BLD: ABNORMAL
EGFR (NO RACE VARIABLE) (RUSH/TITUS): 44 ML/MIN/1.73M2
EOSINOPHIL # BLD AUTO: 0.08 K/UL (ref 0–0.5)
EOSINOPHIL NFR BLD AUTO: 2.1 % (ref 1–4)
ERYTHROCYTE [DISTWIDTH] IN BLOOD BY AUTOMATED COUNT: 14.3 % (ref 11.5–14.5)
EST. AVERAGE GLUCOSE BLD GHB EST-MCNC: 111 MG/DL
GLOBULIN SER-MCNC: 3.5 G/DL (ref 2–4)
GLUCOSE SERPL-MCNC: 116 MG/DL (ref 82–115)
HBA1C MFR BLD HPLC: 5.5 %
HCT VFR BLD AUTO: 38.4 % (ref 38–47)
HGB BLD-MCNC: 11.6 G/DL (ref 12–16)
IMM GRANULOCYTES # BLD AUTO: 0.01 K/UL (ref 0–0.04)
IMM GRANULOCYTES NFR BLD: 0.3 % (ref 0–0.4)
LYMPHOCYTES # BLD AUTO: 1.15 K/UL (ref 1–4.8)
LYMPHOCYTES NFR BLD AUTO: 30.7 % (ref 27–41)
MCH RBC QN AUTO: 28.7 PG (ref 27–31)
MCHC RBC AUTO-ENTMCNC: 30.2 G/DL (ref 32–36)
MCV RBC AUTO: 95 FL (ref 80–96)
MONOCYTES # BLD AUTO: 0.38 K/UL (ref 0–0.8)
MONOCYTES NFR BLD AUTO: 10.1 % (ref 2–6)
MPC BLD CALC-MCNC: 9.7 FL (ref 9.4–12.4)
NEUTROPHILS # BLD AUTO: 2.1 K/UL (ref 1.8–7.7)
NEUTROPHILS NFR BLD AUTO: 56 % (ref 53–65)
NRBC # BLD AUTO: 0 X10E3/UL
NRBC, AUTO (.00): 0 %
PLATELET # BLD AUTO: 233 K/UL (ref 150–400)
POTASSIUM SERPL-SCNC: 4.9 MMOL/L (ref 3.5–5.1)
PROT SERPL-MCNC: 7.3 G/DL (ref 5.8–7.6)
RBC # BLD AUTO: 4.04 M/UL (ref 4.2–5.4)
SODIUM SERPL-SCNC: 142 MMOL/L (ref 136–145)
WBC # BLD AUTO: 3.75 K/UL (ref 4.5–11)

## 2025-02-24 PROCEDURE — 3078F DIAST BP <80 MM HG: CPT | Mod: ,,, | Performed by: INTERNAL MEDICINE

## 2025-02-24 PROCEDURE — 1125F AMNT PAIN NOTED PAIN PRSNT: CPT | Mod: ,,, | Performed by: INTERNAL MEDICINE

## 2025-02-24 PROCEDURE — 3075F SYST BP GE 130 - 139MM HG: CPT | Mod: ,,, | Performed by: INTERNAL MEDICINE

## 2025-02-24 PROCEDURE — 80053 COMPREHEN METABOLIC PANEL: CPT | Mod: ,,, | Performed by: CLINICAL MEDICAL LABORATORY

## 2025-02-24 PROCEDURE — 85025 COMPLETE CBC W/AUTO DIFF WBC: CPT | Mod: ,,, | Performed by: CLINICAL MEDICAL LABORATORY

## 2025-02-24 PROCEDURE — 3044F HG A1C LEVEL LT 7.0%: CPT | Mod: ,,, | Performed by: INTERNAL MEDICINE

## 2025-02-24 PROCEDURE — 1159F MED LIST DOCD IN RCRD: CPT | Mod: ,,, | Performed by: INTERNAL MEDICINE

## 2025-02-24 PROCEDURE — 96372 THER/PROPH/DIAG INJ SC/IM: CPT | Mod: ,,, | Performed by: INTERNAL MEDICINE

## 2025-02-24 PROCEDURE — 83036 HEMOGLOBIN GLYCOSYLATED A1C: CPT | Mod: ,,, | Performed by: CLINICAL MEDICAL LABORATORY

## 2025-02-24 PROCEDURE — 99214 OFFICE O/P EST MOD 30 MIN: CPT | Mod: 25,,, | Performed by: INTERNAL MEDICINE

## 2025-02-24 PROCEDURE — 3008F BODY MASS INDEX DOCD: CPT | Mod: ,,, | Performed by: INTERNAL MEDICINE

## 2025-02-24 RX ORDER — MELOXICAM 15 MG/1
15 TABLET ORAL DAILY
Qty: 30 TABLET | Refills: 2 | Status: SHIPPED | OUTPATIENT
Start: 2025-02-24

## 2025-02-24 RX ORDER — METFORMIN HYDROCHLORIDE 850 MG/1
850 TABLET ORAL 3 TIMES DAILY
Qty: 270 TABLET | Refills: 1 | Status: SHIPPED | OUTPATIENT
Start: 2025-02-24

## 2025-02-24 RX ORDER — GLIPIZIDE 10 MG/1
10 TABLET ORAL 2 TIMES DAILY
Qty: 180 TABLET | Refills: 1 | Status: SHIPPED | OUTPATIENT
Start: 2025-02-24 | End: 2025-08-23

## 2025-02-24 RX ORDER — KETOROLAC TROMETHAMINE 30 MG/ML
15 INJECTION, SOLUTION INTRAMUSCULAR; INTRAVENOUS
Status: COMPLETED | OUTPATIENT
Start: 2025-02-24 | End: 2025-02-24

## 2025-02-24 RX ADMIN — KETOROLAC TROMETHAMINE 15 MG: 30 INJECTION, SOLUTION INTRAMUSCULAR; INTRAVENOUS at 10:02

## 2025-02-25 NOTE — PROGRESS NOTES
"Subjective:       Patient ID: Karie Kaufman is a 69 y.o. female.    Chief Complaint: Knee Pain (Right knee) and Follow-up (Patient presents to clinic for 4 month follow up. )    History of Present Illness    CHIEF COMPLAINT:  Patient presents today for follow up    MUSCULOSKELETAL:  She reports right knee pain worse than left knee. She continues meloxicam for knee arthritis management and expresses desire to avoid surgical intervention.    DIABETES:  She continues Glucotrol for diabetes management.         Current Medications:  Current Medications[1]           ROS  Twelve point system reviewed, unremarkable except for stated above in HPI.        Objective:         Vitals:    02/24/25 0938   BP: 130/79   BP Location: Left arm   Patient Position: Sitting   Pulse: 92   Resp: 18   Temp: 97.5 °F (36.4 °C)   TempSrc: Temporal   SpO2: 95%   Weight: 100.7 kg (222 lb)   Height: 5' 7" (1.702 m)        Physical Exam     Patient is awake alert oriented person place and  Lungs are clear to auscultation bilaterally no crackles or wheezes   Cardiovascular S1-S2 regular rate and rhythm no murmurs rubs or gallops   Abdomen is soft positive bowel sounds nontender, extremities no clubbing cyanosis edema  Neuro no focal neurological deficits  Skin warm and dry.     Last Labs:     Office Visit on 02/24/2025   Component Date Value    Hemoglobin A1C 02/24/2025 5.5     Estimated Average Glucose 02/24/2025 111     Sodium 02/24/2025 142     Potassium 02/24/2025 4.9     Chloride 02/24/2025 110 (H)     CO2 02/24/2025 25     Anion Gap 02/24/2025 12     Glucose 02/24/2025 116 (H)     BUN 02/24/2025 30 (H)     Creatinine 02/24/2025 1.33 (H)     BUN/Creatinine Ratio 02/24/2025 23 (H)     Calcium 02/24/2025 9.8     Total Protein 02/24/2025 7.3     Albumin 02/24/2025 3.8     Globulin 02/24/2025 3.5     A/G Ratio 02/24/2025 1.1     Bilirubin, Total 02/24/2025 0.7     Alk Phos 02/24/2025 58     ALT 02/24/2025 14     AST 02/24/2025 22     eGFR " 02/24/2025 44 (L)     WBC 02/24/2025 3.75 (L)     RBC 02/24/2025 4.04 (L)     Hemoglobin 02/24/2025 11.6 (L)     Hematocrit 02/24/2025 38.4     MCV 02/24/2025 95.0     MCH 02/24/2025 28.7     MCHC 02/24/2025 30.2 (L)     RDW 02/24/2025 14.3     Platelet Count 02/24/2025 233     MPV 02/24/2025 9.7     Neutrophils % 02/24/2025 56.0     Lymphocytes % 02/24/2025 30.7     Monocytes % 02/24/2025 10.1 (H)     Eosinophils % 02/24/2025 2.1     Basophils % 02/24/2025 0.8     Immature Granulocytes % 02/24/2025 0.3     nRBC, Auto 02/24/2025 0.0     Neutrophils, Abs 02/24/2025 2.10     Lymphocytes, Absolute 02/24/2025 1.15     Monocytes, Absolute 02/24/2025 0.38     Eosinophils, Absolute 02/24/2025 0.08     Basophils, Absolute 02/24/2025 0.03     Immature Granulocytes, A* 02/24/2025 0.01     nRBC, Absolute 02/24/2025 0.00     Diff Type 02/24/2025 Auto        Last Imaging:  DXA Bone Density Spine And Hip  Narrative: EXAMINATION:  DXA BONE DENSITY AXIAL SKELETON 1 OR MORE SITES    CLINICAL HISTORY:  Asymptomatic menopausal state    TECHNIQUE:  DXA scanning was performed over the left hip and lumbar spine.  Review of the images confirms satisfactory positioning and technique.    COMPARISON:  None    FINDINGS:  The L1 to L4 vertebral bone mineral density is equal to 1.3 g/cm squared with a T score of 2.2.    The left femoral neck bone mineral density is equal to 1.0 g/cm squared with a T score of 1.0.    The total hip bone mineral density is equal to 1.3 g/cm squared with a T score of 2.4.    There is a 2% risk of a major osteoporotic fracture and a 0.1% risk of hip fracture in the next 10 years (FRAX).  Impression: No evidence of significant bone density loss    Consider FDA approved medical therapies in postmenopausal women and men aged 50 years and older, based on the following:    *A hip or vertebral (clinical or morphometric) fracture  *T score less than or equal to -2.5 at the femoral neck or spine after appropriate evaluation  to exclude secondary causes.  *Low bone mass -- also known as osteopenia (T score between -1.0 and -2.5 at the femoral neck or spine) and a 10 year probability of hip fracture greater than or equal to 3% or a 10 year probability of major osteoporosis-related fracture greater than or equal to 20% based on the [place]-adapted WHO algorithm.  *Clinicians judgment and/or patient preference may indicate treatment for people with 10 year fracture probabilities is above or below these levels.    Electronically signed by: Meet Shah  Date:    01/03/2024  Time:    09:38         **Labs and x-rays personally reviewed by me    ** reviewed           Assessment & Plan:   Assessment & Plan    IMPRESSION:  - Blood pressure at 130/79, deemed satisfactory  - Continued management of diabetes with A1c check planned  - Assessed arthritis in knees, with right knee noted as more problematic  - Considered pain management options:  -  Discussed potential switch from meloxicam to Voltaren cream  -  Suggested combining meloxicam with extra strength Tylenol  -  Opted to maintain current meloxicam regimen  - Performed cardiopulmonary exam, results unremarkable  - Will administer Toradol injection for pain relief    TYPE 2 DIABETES MELLITUS WITH DIABETIC PERIPHERAL ANGIOPATHY WITHOUT GANGRENE:  - Ordered labs including A1c to assess diabetes status.  - Refilled Glucotrol prescription for ongoing diabetes management.    HYPERTENSIVE HEART DISEASE WITH HEART FAILURE:  - Performed cardiovascular assessment: blood pressure measured at 130/79, and conducted heart and lung exam.    KNEE ARTHRITIS:  - Patient has arthritis in both knees, with the right knee being more severe.  - Reviewed recent x-rays of both knees.  - Inquired about previous orthopedic consultation and potential need for surgery.    PAIN MANAGEMENT:  - Continue meloxicam 15 mg daily, to be taken after eating in the morning.  - Administered Toradol injection for pain relief.  -  Suggested combining meloxicam with extra strength Tylenol 2 times daily for enhanced pain management.    FOLLOW UP:  - Follow up appointment scheduled for July.           1. Type 2 diabetes mellitus without complication, without long-term current use of insulin  -     metFORMIN (GLUCOPHAGE) 850 MG tablet; Take 1 tablet (850 mg total) by mouth 3 (three) times daily.  Dispense: 270 tablet; Refill: 1  -     glipiZIDE (GLUCOTROL) 10 MG tablet; Take 1 tablet (10 mg total) by mouth 2 (two) times a day.  Dispense: 180 tablet; Refill: 1  -     Hemoglobin A1C; Future; Expected date: 02/24/2025    2. Primary hypertension  -     CBC Auto Differential; Future; Expected date: 02/24/2025  -     Comprehensive Metabolic Panel; Future; Expected date: 02/24/2025    3. Right knee pain, unspecified chronicity  -     meloxicam (MOBIC) 15 MG tablet; Take 1 tablet (15 mg total) by mouth once daily.  Dispense: 30 tablet; Refill: 2  -     ketorolac injection 15 mg            Gabriel Mckenzie MD  This note was generated with the assistance of ambient listening technology. Verbal consent was obtained by the patient and accompanying visitor(s) for the recording of patient appointment to facilitate this note. I attest to having reviewed and edited the generated note for accuracy, though some syntax or spelling errors may persist. Please contact the author of this note for any clarification.            [1]   Current Outpatient Medications:     acetaminophen (TYLENOL) 500 MG tablet, Take 500 mg by mouth every 6 (six) hours as needed for Pain., Disp: , Rfl:     aspirin (ECOTRIN) 81 MG EC tablet, Take 81 mg by mouth once daily., Disp: , Rfl:     atorvastatin (LIPITOR) 80 MG tablet, Take 1 tablet (80 mg total) by mouth once daily., Disp: 90 tablet, Rfl: 0    carvediloL (COREG) 6.25 MG tablet, Take 6.25 mg by mouth 2 (two) times daily., Disp: , Rfl:     clopidogreL (PLAVIX) 75 mg tablet, Take 75 mg by mouth once daily., Disp: , Rfl:     ezetimibe  (ZETIA) 10 mg tablet, Take 10 mg by mouth once daily., Disp: , Rfl:     lisinopriL-hydrochlorothiazide (PRINZIDE,ZESTORETIC) 20-12.5 mg per tablet, Take 1 tablet by mouth 2 (two) times daily., Disp: 90 tablet, Rfl: 1    multivit-min/folic acid/biotin (HAIR,SKIN AND NAILS,FA-BIOTIN, ORAL), Take by mouth., Disp: , Rfl:     multivit-min/iron/FA/vit K/lut (CENTRUM SILVER WOMEN ORAL), Take by mouth once daily., Disp: , Rfl:     multivitamin with minerals tablet, Take 1 tablet by mouth once daily., Disp: , Rfl:     multivitamin/iron/folic acid (CENTRUM WOMEN ORAL), Take by mouth., Disp: , Rfl:     mupirocin (BACTROBAN) 2 % ointment, Apply topically 3 (three) times daily., Disp: 30 g, Rfl: 0    triamterene-hydrochlorothiazide 37.5-25 mg (MAXZIDE-25) 37.5-25 mg per tablet, Take 1 tablet by mouth once daily., Disp: 90 tablet, Rfl: 1    vitamin D (VITAMIN D3) 1000 units Tab, Take 1,000 Units by mouth once daily., Disp: , Rfl:     glipiZIDE (GLUCOTROL) 10 MG tablet, Take 1 tablet (10 mg total) by mouth 2 (two) times a day., Disp: 180 tablet, Rfl: 1    meloxicam (MOBIC) 15 MG tablet, Take 1 tablet (15 mg total) by mouth once daily., Disp: 30 tablet, Rfl: 2    metFORMIN (GLUCOPHAGE) 850 MG tablet, Take 1 tablet (850 mg total) by mouth 3 (three) times daily., Disp: 270 tablet, Rfl: 1  No current facility-administered medications for this visit.

## 2025-02-28 ENCOUNTER — EXTERNAL CHRONIC CARE MANAGEMENT (OUTPATIENT)
Dept: FAMILY MEDICINE | Facility: CLINIC | Age: 70
End: 2025-02-28
Payer: MEDICARE

## 2025-02-28 PROCEDURE — G0511 CCM/BHI BY RHC/FQHC 20MIN MO: HCPCS | Mod: ,,, | Performed by: INTERNAL MEDICINE

## 2025-03-31 ENCOUNTER — EXTERNAL CHRONIC CARE MANAGEMENT (OUTPATIENT)
Dept: FAMILY MEDICINE | Facility: CLINIC | Age: 70
End: 2025-03-31
Payer: MEDICARE

## 2025-03-31 PROCEDURE — G0511 CCM/BHI BY RHC/FQHC 20MIN MO: HCPCS | Mod: ,,, | Performed by: INTERNAL MEDICINE

## 2025-04-30 ENCOUNTER — EXTERNAL CHRONIC CARE MANAGEMENT (OUTPATIENT)
Dept: FAMILY MEDICINE | Facility: CLINIC | Age: 70
End: 2025-04-30
Payer: MEDICARE

## 2025-04-30 PROCEDURE — 99490 CHRNC CARE MGMT STAFF 1ST 20: CPT | Mod: ,,, | Performed by: INTERNAL MEDICINE

## 2025-05-31 ENCOUNTER — EXTERNAL CHRONIC CARE MANAGEMENT (OUTPATIENT)
Dept: INTERNAL MEDICINE | Facility: CLINIC | Age: 70
End: 2025-05-31
Payer: MEDICARE

## 2025-05-31 PROCEDURE — 99490 CHRNC CARE MGMT STAFF 1ST 20: CPT | Mod: PBBFAC | Performed by: INTERNAL MEDICINE

## 2025-05-31 PROCEDURE — 99490 CHRNC CARE MGMT STAFF 1ST 20: CPT | Mod: S$PBB,,, | Performed by: INTERNAL MEDICINE

## 2025-06-30 ENCOUNTER — EXTERNAL CHRONIC CARE MANAGEMENT (OUTPATIENT)
Dept: INTERNAL MEDICINE | Facility: CLINIC | Age: 70
End: 2025-06-30
Payer: MEDICARE

## 2025-06-30 PROCEDURE — 99490 CHRNC CARE MGMT STAFF 1ST 20: CPT | Mod: ,,, | Performed by: INTERNAL MEDICINE

## 2025-07-24 ENCOUNTER — OFFICE VISIT (OUTPATIENT)
Dept: FAMILY MEDICINE | Facility: CLINIC | Age: 70
End: 2025-07-24
Payer: MEDICARE

## 2025-07-24 VITALS
HEIGHT: 67 IN | TEMPERATURE: 98 F | RESPIRATION RATE: 17 BRPM | SYSTOLIC BLOOD PRESSURE: 132 MMHG | DIASTOLIC BLOOD PRESSURE: 72 MMHG | BODY MASS INDEX: 34.21 KG/M2 | HEART RATE: 95 BPM | OXYGEN SATURATION: 99 % | WEIGHT: 218 LBS

## 2025-07-24 DIAGNOSIS — M19.90 OSTEOARTHRITIS, UNSPECIFIED OSTEOARTHRITIS TYPE, UNSPECIFIED SITE: ICD-10-CM

## 2025-07-24 DIAGNOSIS — M25.561 RIGHT KNEE PAIN, UNSPECIFIED CHRONICITY: ICD-10-CM

## 2025-07-24 DIAGNOSIS — E78.5 DYSLIPIDEMIA: Primary | ICD-10-CM

## 2025-07-24 DIAGNOSIS — E11.9 TYPE 2 DIABETES MELLITUS WITHOUT COMPLICATION, WITHOUT LONG-TERM CURRENT USE OF INSULIN: ICD-10-CM

## 2025-07-24 LAB
ANION GAP SERPL CALCULATED.3IONS-SCNC: 9 MMOL/L (ref 7–16)
BUN SERPL-MCNC: 28 MG/DL (ref 10–20)
BUN/CREAT SERPL: 23 (ref 6–20)
CALCIUM SERPL-MCNC: 10 MG/DL (ref 8.4–10.2)
CHLORIDE SERPL-SCNC: 109 MMOL/L (ref 98–107)
CHOLEST SERPL-MCNC: 158 MG/DL
CHOLEST/HDLC SERPL: 3.1 {RATIO}
CO2 SERPL-SCNC: 25 MMOL/L (ref 23–31)
CREAT SERPL-MCNC: 1.24 MG/DL (ref 0.55–1.02)
CREAT UR-MCNC: 39 MG/DL (ref 15–325)
EGFR (NO RACE VARIABLE) (RUSH/TITUS): 48 ML/MIN/1.73M2
EST. AVERAGE GLUCOSE BLD GHB EST-MCNC: 97 MG/DL
GLUCOSE SERPL-MCNC: 103 MG/DL (ref 82–115)
HBA1C MFR BLD HPLC: 5 %
HDLC SERPL-MCNC: 51 MG/DL (ref 35–60)
LDLC SERPL CALC-MCNC: 90 MG/DL
LDLC/HDLC SERPL: 1.8 {RATIO}
NONHDLC SERPL-MCNC: 107 MG/DL
POTASSIUM SERPL-SCNC: 5.1 MMOL/L (ref 3.5–5.1)
PROT UR-MCNC: <6.8 MG/DL
PROT/CREAT 24H UR-RTO: NORMAL
SODIUM SERPL-SCNC: 138 MMOL/L (ref 136–145)
TRIGL SERPL-MCNC: 84 MG/DL (ref 37–140)
VLDLC SERPL-MCNC: 17 MG/DL

## 2025-07-24 PROCEDURE — 82570 ASSAY OF URINE CREATININE: CPT | Mod: ,,, | Performed by: CLINICAL MEDICAL LABORATORY

## 2025-07-24 PROCEDURE — 83036 HEMOGLOBIN GLYCOSYLATED A1C: CPT | Mod: ,,, | Performed by: CLINICAL MEDICAL LABORATORY

## 2025-07-24 PROCEDURE — 80048 BASIC METABOLIC PNL TOTAL CA: CPT | Mod: ,,, | Performed by: CLINICAL MEDICAL LABORATORY

## 2025-07-24 PROCEDURE — 80061 LIPID PANEL: CPT | Mod: ,,, | Performed by: CLINICAL MEDICAL LABORATORY

## 2025-07-24 PROCEDURE — 84156 ASSAY OF PROTEIN URINE: CPT | Mod: ,,, | Performed by: CLINICAL MEDICAL LABORATORY

## 2025-07-24 RX ORDER — MELOXICAM 15 MG/1
15 TABLET ORAL DAILY
Qty: 30 TABLET | Refills: 2 | Status: SHIPPED | OUTPATIENT
Start: 2025-07-24

## 2025-07-24 RX ORDER — KETOROLAC TROMETHAMINE 30 MG/ML
15 INJECTION, SOLUTION INTRAMUSCULAR; INTRAVENOUS
Status: COMPLETED | OUTPATIENT
Start: 2025-07-24 | End: 2025-07-24

## 2025-07-24 RX ORDER — METFORMIN HYDROCHLORIDE 850 MG/1
850 TABLET ORAL 3 TIMES DAILY
Qty: 270 TABLET | Refills: 1 | Status: SHIPPED | OUTPATIENT
Start: 2025-07-24

## 2025-07-24 RX ORDER — GLIPIZIDE 10 MG/1
10 TABLET ORAL 2 TIMES DAILY
Qty: 180 TABLET | Refills: 1 | Status: SHIPPED | OUTPATIENT
Start: 2025-07-24 | End: 2026-01-20

## 2025-07-24 RX ADMIN — KETOROLAC TROMETHAMINE 15 MG: 30 INJECTION, SOLUTION INTRAMUSCULAR; INTRAVENOUS at 09:07

## 2025-07-24 NOTE — PROGRESS NOTES
"Subjective:       Patient ID: Karie Kaufman is a 69 y.o. female.    Chief Complaint: Diabetes and Health Maintenance (Foot Exam Never done-refuses/Diabetes Urine Screening due on 07/22/2025/Lipid Panel due on 07/22/2025-today /Diabetic Eye Exam due on 08/12/2025- [facility]/Hemoglobin A1c due on 08/24/2025/Colorectal Cancer Screening due on 09/30/2025 -refuses)    History of Present Illness    CHIEF COMPLAINT:  Patient presents today for right knee pain.    MUSCULOSKELETAL:  She reports right knee pain. The specific characteristics, severity, and duration of the pain were not described.    DIABETES:  Her last A1C was 5.5.         Current Medications:  Current Medications[1]           ROS  Twelve point system reviewed, unremarkable except for stated above in HPI.        Objective:         Vitals:    07/24/25 0819 07/24/25 0823   BP: (!) 143/83 132/72   BP Location: Left arm    Patient Position: Sitting    Pulse: 95    Resp: 17    Temp: 97.5 °F (36.4 °C)    TempSrc: Temporal    SpO2: 99%    Weight: 98.9 kg (218 lb)    Height: 5' 7" (1.702 m)         Physical Exam     Patient is awake alert oriented person place and  Lungs are clear to auscultation bilaterally no crackles or wheezes   Cardiovascular S1-S2 regular rate and rhythm no murmurs rubs or gallops   Abdomen is soft positive bowel sounds nontender, extremities no clubbing cyanosis edema  Neuro no focal neurological deficits  Skin warm and dry.     Last Labs:     No visits with results within 1 Month(s) from this visit.   Latest known visit with results is:   Office Visit on 02/24/2025   Component Date Value    Hemoglobin A1C 02/24/2025 5.5     Estimated Average Glucose 02/24/2025 111     Sodium 02/24/2025 142     Potassium 02/24/2025 4.9     Chloride 02/24/2025 110 (H)     CO2 02/24/2025 25     Anion Gap 02/24/2025 12     Glucose 02/24/2025 116 (H)     BUN 02/24/2025 30 (H)     Creatinine 02/24/2025 1.33 (H)     BUN/Creatinine Ratio 02/24/2025 " 23 (H)     Calcium 02/24/2025 9.8     Total Protein 02/24/2025 7.3     Albumin 02/24/2025 3.8     Globulin 02/24/2025 3.5     A/G Ratio 02/24/2025 1.1     Bilirubin, Total 02/24/2025 0.7     Alk Phos 02/24/2025 58     ALT 02/24/2025 14     AST 02/24/2025 22     eGFR 02/24/2025 44 (L)     WBC 02/24/2025 3.75 (L)     RBC 02/24/2025 4.04 (L)     Hemoglobin 02/24/2025 11.6 (L)     Hematocrit 02/24/2025 38.4     MCV 02/24/2025 95.0     MCH 02/24/2025 28.7     MCHC 02/24/2025 30.2 (L)     RDW 02/24/2025 14.3     Platelet Count 02/24/2025 233     MPV 02/24/2025 9.7     Neutrophils % 02/24/2025 56.0     Lymphocytes % 02/24/2025 30.7     Monocytes % 02/24/2025 10.1 (H)     Eosinophils % 02/24/2025 2.1     Basophils % 02/24/2025 0.8     Immature Granulocytes % 02/24/2025 0.3     nRBC, Auto 02/24/2025 0.0     Neutrophils, Abs 02/24/2025 2.10     Lymphocytes, Absolute 02/24/2025 1.15     Monocytes, Absolute 02/24/2025 0.38     Eosinophils, Absolute 02/24/2025 0.08     Basophils, Absolute 02/24/2025 0.03     Immature Granulocytes, A* 02/24/2025 0.01     nRBC, Absolute 02/24/2025 0.00     Diff Type 02/24/2025 Auto        Last Imaging:  DXA Bone Density Spine And Hip  Narrative: EXAMINATION:  DXA BONE DENSITY AXIAL SKELETON 1 OR MORE SITES    CLINICAL HISTORY:  Asymptomatic menopausal state    TECHNIQUE:  DXA scanning was performed over the left hip and lumbar spine.  Review of the images confirms satisfactory positioning and technique.    COMPARISON:  None    FINDINGS:  The L1 to L4 vertebral bone mineral density is equal to 1.3 g/cm squared with a T score of 2.2.    The left femoral neck bone mineral density is equal to 1.0 g/cm squared with a T score of 1.0.    The total hip bone mineral density is equal to 1.3 g/cm squared with a T score of 2.4.    There is a 2% risk of a major osteoporotic fracture and a 0.1% risk of hip fracture in the next 10 years (FRAX).  Impression: No evidence of significant bone density  loss    Consider FDA approved medical therapies in postmenopausal women and men aged 50 years and older, based on the following:    *A hip or vertebral (clinical or morphometric) fracture  *T score less than or equal to -2.5 at the femoral neck or spine after appropriate evaluation to exclude secondary causes.  *Low bone mass -- also known as osteopenia (T score between -1.0 and -2.5 at the femoral neck or spine) and a 10 year probability of hip fracture greater than or equal to 3% or a 10 year probability of major osteoporosis-related fracture greater than or equal to 20% based on the [place]-adapted WHO algorithm.  *Clinicians judgment and/or patient preference may indicate treatment for people with 10 year fracture probabilities is above or below these levels.    Electronically signed by: Meet Shah  Date:    01/03/2024  Time:    09:38         **Labs and x-rays personally reviewed by me    ** reviewed           Assessment & Plan:   Assessment & Plan    E11.9 Type 2 diabetes mellitus without complication, without long-term current use of insulin  M25.561 Right knee pain, unspecified chronicity  E78.5 Dyslipidemia  M19.90 Osteoarthritis, unspecified osteoarthritis type, unspecified site  E11.3293 Type 2 diabetes mellitus with mild nonproliferative diabetic retinopathy without macular edema, bilateral    IMPRESSION:  - Considered pain management options for ongoing knee issues.    E11.9 TYPE 2 DIABETES MELLITUS WITHOUT COMPLICATION, WITHOUT LONG-TERM CURRENT USE OF INSULIN:  - Ordered labs, including A1C, noting previous result of 5.5 was excellent.  - Will recheck A1C today as part of regular monitoring every 4 months.  - Refilled diabetes medications.    M25.561 RIGHT KNEE PAIN, UNSPECIFIED CHRONICITY:  - Administered Toradol injection for right knee pain.    ** DIAGNOSES NOT IN VISIT DX OR FOR REVIEW **  E11.3293 TYPE 2 DIABETES MELLITUS WITH MILD NONPROLIFERATIVE DIABETIC RETINOPATHY WITHOUT MACULAR EDEMA,  BILATERAL:  - Patient's glycemic control appears good based on previous A1C of 5.5.  - Will continue current diabetes management plan with medication refills as noted above.           1. Dyslipidemia  -     Lipid Panel; Future; Expected date: 07/24/2025    2. Right knee pain, unspecified chronicity  -     meloxicam (MOBIC) 15 MG tablet; Take 1 tablet (15 mg total) by mouth once daily.  Dispense: 30 tablet; Refill: 2    3. Type 2 diabetes mellitus without complication, without long-term current use of insulin  -     glipiZIDE (GLUCOTROL) 10 MG tablet; Take 1 tablet (10 mg total) by mouth 2 (two) times a day.  Dispense: 180 tablet; Refill: 1  -     metFORMIN (GLUCOPHAGE) 850 MG tablet; Take 1 tablet (850 mg total) by mouth 3 (three) times daily.  Dispense: 270 tablet; Refill: 1  -     Hemoglobin A1C; Future; Expected date: 07/24/2025  -     Basic Metabolic Panel; Future; Expected date: 07/24/2025  -     Protein/Creatinine Ratio, Urine; Future; Expected date: 07/24/2025    4. Osteoarthritis, unspecified osteoarthritis type, unspecified site  -     ketorolac injection 15 mg  -     Ambulatory referral/consult to Orthopedics; Future; Expected date: 07/31/2025            Gabriel Mckenzie MD  This note was generated with the assistance of ambient listening technology. Verbal consent was obtained by the patient and accompanying visitor(s) for the recording of patient appointment to facilitate this note. I attest to having reviewed and edited the generated note for accuracy, though some syntax or spelling errors may persist. Please contact the author of this note for any clarification.            [1]   Current Outpatient Medications:     acetaminophen (TYLENOL) 500 MG tablet, Take 500 mg by mouth every 6 (six) hours as needed for Pain., Disp: , Rfl:     aspirin (ECOTRIN) 81 MG EC tablet, Take 81 mg by mouth once daily., Disp: , Rfl:     atorvastatin (LIPITOR) 80 MG tablet, Take 1 tablet (80 mg total) by mouth once daily.,  Disp: 90 tablet, Rfl: 0    carvediloL (COREG) 6.25 MG tablet, Take 6.25 mg by mouth 2 (two) times daily., Disp: , Rfl:     clopidogreL (PLAVIX) 75 mg tablet, Take 75 mg by mouth once daily., Disp: , Rfl:     ezetimibe (ZETIA) 10 mg tablet, Take 10 mg by mouth once daily., Disp: , Rfl:     lisinopriL-hydrochlorothiazide (PRINZIDE,ZESTORETIC) 20-12.5 mg per tablet, Take 1 tablet by mouth 2 (two) times daily., Disp: 90 tablet, Rfl: 1    multivit-min/folic acid/biotin (HAIR,SKIN AND NAILS,FA-BIOTIN, ORAL), Take by mouth., Disp: , Rfl:     multivit-min/iron/FA/vit K/lut (CENTRUM SILVER WOMEN ORAL), Take by mouth once daily., Disp: , Rfl:     multivitamin with minerals tablet, Take 1 tablet by mouth once daily., Disp: , Rfl:     multivitamin/iron/folic acid (CENTRUM WOMEN ORAL), Take by mouth., Disp: , Rfl:     mupirocin (BACTROBAN) 2 % ointment, Apply topically 3 (three) times daily., Disp: 30 g, Rfl: 0    triamterene-hydrochlorothiazide 37.5-25 mg (MAXZIDE-25) 37.5-25 mg per tablet, Take 1 tablet by mouth once daily., Disp: 90 tablet, Rfl: 1    vitamin D (VITAMIN D3) 1000 units Tab, Take 1,000 Units by mouth once daily., Disp: , Rfl:     glipiZIDE (GLUCOTROL) 10 MG tablet, Take 1 tablet (10 mg total) by mouth 2 (two) times a day., Disp: 180 tablet, Rfl: 1    meloxicam (MOBIC) 15 MG tablet, Take 1 tablet (15 mg total) by mouth once daily., Disp: 30 tablet, Rfl: 2    metFORMIN (GLUCOPHAGE) 850 MG tablet, Take 1 tablet (850 mg total) by mouth 3 (three) times daily., Disp: 270 tablet, Rfl: 1  No current facility-administered medications for this visit.

## 2025-07-25 ENCOUNTER — PATIENT OUTREACH (OUTPATIENT)
Facility: HOSPITAL | Age: 70
End: 2025-07-25
Payer: MEDICARE

## 2025-07-25 NOTE — LETTER
AUTHORIZATION FOR RELEASE OF   CONFIDENTIAL INFORMATION    Dear [facility],    We are seeing Karie Kaufman, date of birth 1955, in the clinic at [facility]. Gabriel Mckenzie MD is the patient's PCP. Karie Kaufman has an outstanding lab/procedure at the time we reviewed her chart. In order to help keep her health information updated, she has authorized us to request the following medical record(s):        (  )  MAMMOGRAM                                      (  )  COLONOSCOPY      (  )  PAP SMEAR                                          (  )  OUTSIDE LAB RESULTS     (  )  DEXA SCAN                                          ( X )  EYE EXAM            (  )  FOOT EXAM                                          (  )  ENTIRE RECORD     (  )  OUTSIDE IMMUNIZATIONS                 (  )  _______________         Please fax records to Izzy Aguiar LPN Care Coordinator at [phone].       If you have any questions, please call [phone].           Patient Name: Karie Kaufman  : 1955  Patient Phone #: [phone]            Karie Kaufman  MRN: [id number]  : 1955  Age: 68 y.o.  Sex: female         Patient/Legal Guardian Signature  This signature was collected at 2023    KARIE KAUFMAN       _______________________________   Printed Name/Relationship to Patient      Consent for Examination and Treatment: I hereby authorize the providers and employees of [facility] ([facility]) to provide medical treatment/services which includes, but is not limited to, performing and administering tests and diagnostic procedures that are deemed necessary, including, but not limited to, imaging examinations, blood tests and other laboratory procedures as may be required by the hospital, clinic, or may be ordered by my physician(s) or persons working under the general and/or special instructions of my physician(s).      I understand and agree that this consent covers all  authorized persons, including but not limited to physicians, residents, nurse practitioners, physicians' assistants, specialists, consultants, student nurses, and independently contracted physicians, who are called upon by the physician in charge, to carry out the diagnostic procedures and medical or surgical treatment.     I hereby authorize [facility] to retain or dispose of any specimens or tissue, should there be such remaining from any test or procedure.     I hereby authorize and give consent for [facility] providers and employees to take photographs, images or videotapes of such diagnostic, surgical or treatment procedures of Patient as may be required by [facility] or as may be ordered by a physician. I further acknowledge and agree that [facility] may use cameras or other devices for patient monitoring.     I am aware that the practice of medicine is not an exact science, and I acknowledge that no guarantees have been made to me as to the outcome of any tests, procedures or treatment.     Authorization for Release of Information: I understand that my insurance company and/or their agents may need information necessary to make determinations about payment/reimbursement. I hereby provide authorization to release to all insurance companies, their successors, assignees, other parties with whom they may have contracted, or others acting on their behalf, that are involved with payment for any hospital and/or clinic charges incurred by the patient, any information that they request and deem necessary for payment/reimbursement, and/or quality review.  I further authorize the release of my health information to physicians or other health care practitioners on staff who are involved in my health care now and in the future, and to other health care providers, entities, or institutions for the purpose of my continued care and treatment, including referrals.     REGISTRATION AUTHORIZATION  Form No. 34284 (Rev. 7/13/2022)        Medicare Patient's Certification and Authorization to Release Information and Payment Request:  I certify that the information given by me in applying for payment under Title XVIII of the Social Security Act is correct. I authorize any robbins of medical or other information about me to release to the Social SecurityAdministration, or its intermediaries or carriers, any information needed for this or a related Medicare claim. I request that payment of authorized benefits be made on my behalf.     Assignment of Insurance Benefits:   I hereby authorize any and all insurance companies, health plans, defined   benefit plans, health insurers or any entity that is or may be responsible for payment of my medical expenses to pay all hospital and medical benefits now due, and to become due and payable to me under any hospital benefits, sick benefits, injury benefits or any other benefit for services rendered to me, including Major Medical Benefits, direct to [facility] and all independently contracted physicians. I assign any and all rights that I may have against any and all insurance companies, health plans, defined benefit plans, health insurers or any entity that is or may be responsible for payment of my medical expenses, including, but not limited to any right to appeal a denial of a claim, any right to bring any action, lawsuit, administrative proceeding, or other cause of action on my behalf. I specifically assign my right to pursue litigation against any and all insurance companies, health plans, defined benefit plans, health insurers or any entity that is or may be responsible for payment of my medical expenses based upon a refusal to pay charges.            E. Valuables: It is understood and agreed that [facility] is not liable for the damage to or loss of any money, jewelry,   documents, dentures, eye glasses, hearing aids, prosthetics, or other property of value.     F. Computer Equipment: I understand and agree that  should I choose to use computer equipment owned by [facility] or if I choose to access the Internet via [facility]s network, I do so at my own risk. [facility] is not responsible for any damage to my computer equipment or to any damages of any type that might arise from my loss of equipment or data.     G. Acceptance of Financial Responsibility:  I agree that in consideration of the services and   supplies that have been   or will be furnished to the patient, I am hereby obligated to pay all charges made for or on the account of the patient according to the standard rates (in effect at the time the services and supplies are delivered) established by [facility], including its Patient Financial Assistance Policy to the extent it is applicable. I understand that I am responsible for all charges, or portions thereof, not covered by insurance or other sources. Patient refunds will be distributed only after balances at all [facility] facilities are paid.     H. Communication Authorization:  I hereby authorize [facility] and its representatives, along with any billing service   or  who may work on their behalf, to contact me on   my cell phone and/or home phone using pre- recorded messages, artificial voice messages, automatic telephone dialing devices or other computer assisted technology, or by electronic      mail, text messaging, or by any other form of electronic communication. This includes, but is not limited to, appointment reminders, yearly physical exam reminders, preventive care reminders, patient campaigns, welcome calls, and calls about account balances on my account or any account on which I am listed as a guarantor. I understand I have the right to opt out of these communications at any time.      Relationship  Between  Facility and  Provider:      I understand that some, but not all, providers furnishing services to the patient are not employees or agents of [facility]. The patient is under the care and  supervision of his/her attending physician, and it is the responsibility of the facility and its nursing staff to carry out the instructions of such physicians. It is the responsibility of the patient's physician/designee to obtain the patient's informed consent, when required, for medical or surgical treatment, special diagnostic or therapeutic procedures, or hospital services rendered for the patient under the special instructions of the physician/designee.     REGISTRATION AUTHORIZATION  Form No. 96401 (Rev. 7/13/2022)      Notice of Privacy Practices: I acknowledge I have received a copy of [facility]'s Notice of Privacy Practices.     Facility  Directory: I have discussed with the organization my desire to be either included or excluded  in the facility directory in the event of my being an inpatient at an [facility] facility. I understand that if my choice is to opt-out of being identified in the facility directory that the facility will not provide any information about me such as my condition (e.g. fair, stable, etc.) or my location in the facility (e.g., room number, department).     Immunizations: [facility] shares immunization information with state sponsored health departments to help you and your doctor keep track of your immunization records. By signing, you consent to have this information shared with the health department in your state:                                [place] - LINKS ([place] Immunization Network for Kids Statewide)                                [place] - MIIX ([place] Immunization Information eXchange)                                [place] - ImmPRINT (Immunization Patient Registry with Integrated Technology)     TERM: This authorization is valid for this and subsequent care/treatment I receive at [facility] and will remain valid unless/until revoked in writing by me.     [facility]: As used in this document, [facility] means all [facility] owned and managed facilities,  including, but not limited to, all health centers, surgery centers, clinics, urgent care centers, and hospitals.         [facility] complies with applicable Federal civil rights laws and does not discriminate on the basis of race, color, national origin, age, disability, or sex.  ATENCIÓN: si habla roqueañol, tiene a barrios disposición servicios gratuitos de asistencia lingüística. Amna al [phone].  CHÚ Ý: N?u b?n nói Ti?ng Vi?t, có các d?ch v? h? tr? ngôn ng? mi?n phí dành cho b?n. G?i s? [phone].        REGISTRATION AUTHORIZATION  Form No. 09158 (Rev. 7/13/2022)

## 2025-07-25 NOTE — PROGRESS NOTES
Population Health Chart Review & Patient Outreach Details        Health Maintenance Topics Addressed and Outreach Outcomes / Actions Taken:  Diabetic Eye Exam [x] Bill sent primary eye care

## 2025-07-31 ENCOUNTER — EXTERNAL CHRONIC CARE MANAGEMENT (OUTPATIENT)
Dept: INTERNAL MEDICINE | Facility: CLINIC | Age: 70
End: 2025-07-31
Payer: MEDICARE

## 2025-07-31 PROCEDURE — 99490 CHRNC CARE MGMT STAFF 1ST 20: CPT | Mod: ,,, | Performed by: INTERNAL MEDICINE

## 2025-08-25 ENCOUNTER — PATIENT OUTREACH (OUTPATIENT)
Facility: HOSPITAL | Age: 70
End: 2025-08-25
Payer: MEDICARE

## 2025-08-25 ENCOUNTER — PATIENT MESSAGE (OUTPATIENT)
Facility: HOSPITAL | Age: 70
End: 2025-08-25
Payer: MEDICARE

## (undated) DEVICE — CATH URETHRAL RED RUB STL 16FR

## (undated) DEVICE — SPONGE XRAY DETECT 4X4IN PK/10

## (undated) DEVICE — TRAY VAGINAL SKIN PREP DRY

## (undated) DEVICE — GLOVE SURGICAL PROTEXIS PI SIZE 6

## (undated) DEVICE — Device

## (undated) DEVICE — MYOSURE TISSUE REMOVAL DEVICE

## (undated) DEVICE — TOWEL OR STERILE BLUE 4/PK 20PK/CS

## (undated) DEVICE — GLOVE SURGICAL PROTEXIS PI SIZE 7

## (undated) DEVICE — MYOSURE SINGLE SEAL SET

## (undated) DEVICE — GOWN SURGICAL SMARTGOWN LEVEL 4 / EXTRA LARGE STERILE

## (undated) DEVICE — SET IRRIG CYSTO/BLADDER 78IN

## (undated) DEVICE — SOL IRRIGATION SALINE 3000ML BAG

## (undated) DEVICE — DRESSING TELFA NONADH 3X4 STL AMD